# Patient Record
Sex: MALE | Race: WHITE | Employment: OTHER
[De-identification: names, ages, dates, MRNs, and addresses within clinical notes are randomized per-mention and may not be internally consistent; named-entity substitution may affect disease eponyms.]

---

## 2017-01-17 ENCOUNTER — TELEPHONE (OUTPATIENT)
Dept: GASTROENTEROLOGY | Facility: CLINIC | Age: 57
End: 2017-01-17

## 2017-01-17 DIAGNOSIS — I85.10 SECONDARY ESOPHAGEAL VARICES WITHOUT BLEEDING (HCC): Primary | ICD-10-CM

## 2017-02-16 DIAGNOSIS — D70.9 NEUTROPENIA, UNSPECIFIED TYPE (HCC): ICD-10-CM

## 2017-02-16 DIAGNOSIS — D69.6 THROMBOCYTOPENIA (HCC): ICD-10-CM

## 2017-02-16 DIAGNOSIS — I85.10 SECONDARY ESOPHAGEAL VARICES WITHOUT BLEEDING (HCC): ICD-10-CM

## 2017-02-20 ENCOUNTER — OFFICE VISIT (OUTPATIENT)
Dept: ONCOLOGY | Facility: CLINIC | Age: 57
End: 2017-02-20

## 2017-02-20 VITALS
DIASTOLIC BLOOD PRESSURE: 84 MMHG | TEMPERATURE: 98 F | WEIGHT: 206 LBS | RESPIRATION RATE: 20 BRPM | SYSTOLIC BLOOD PRESSURE: 142 MMHG | BODY MASS INDEX: 23.83 KG/M2 | HEIGHT: 78 IN | HEART RATE: 87 BPM

## 2017-02-20 DIAGNOSIS — D69.6 THROMBOCYTOPENIA (HCC): Primary | ICD-10-CM

## 2017-02-20 DIAGNOSIS — I10 ESSENTIAL HYPERTENSION: ICD-10-CM

## 2017-02-20 DIAGNOSIS — E11.8 TYPE 2 DIABETES MELLITUS WITH COMPLICATION, UNSPECIFIED LONG TERM INSULIN USE STATUS: ICD-10-CM

## 2017-02-20 DIAGNOSIS — I85.10 SECONDARY ESOPHAGEAL VARICES WITHOUT BLEEDING (HCC): ICD-10-CM

## 2017-02-20 DIAGNOSIS — D70.9 NEUTROPENIA, UNSPECIFIED TYPE (HCC): ICD-10-CM

## 2017-02-20 PROCEDURE — 99213 OFFICE O/P EST LOW 20 MIN: CPT | Performed by: INTERNAL MEDICINE

## 2017-02-20 PROCEDURE — G8427 DOCREV CUR MEDS BY ELIG CLIN: HCPCS | Performed by: INTERNAL MEDICINE

## 2017-02-20 PROCEDURE — 3045F PR MOST RECENT HEMOGLOBIN A1C LEVEL 7.0-9.0%: CPT | Performed by: INTERNAL MEDICINE

## 2017-02-20 PROCEDURE — G8482 FLU IMMUNIZE ORDER/ADMIN: HCPCS | Performed by: INTERNAL MEDICINE

## 2017-02-20 PROCEDURE — G8420 CALC BMI NORM PARAMETERS: HCPCS | Performed by: INTERNAL MEDICINE

## 2017-02-20 PROCEDURE — 3017F COLORECTAL CA SCREEN DOC REV: CPT | Performed by: INTERNAL MEDICINE

## 2017-02-20 PROCEDURE — 1036F TOBACCO NON-USER: CPT | Performed by: INTERNAL MEDICINE

## 2017-02-27 ENCOUNTER — TELEPHONE (OUTPATIENT)
Dept: ONCOLOGY | Facility: CLINIC | Age: 57
End: 2017-02-27

## 2017-02-27 RX ORDER — FERROUS SULFATE 325(65) MG
325 TABLET ORAL DAILY
Qty: 30 TABLET | Refills: 5 | Status: SHIPPED | OUTPATIENT
Start: 2017-02-27 | End: 2017-08-24 | Stop reason: SDUPTHER

## 2017-03-14 ENCOUNTER — ANESTHESIA EVENT (OUTPATIENT)
Dept: OPERATING ROOM | Age: 57
End: 2017-03-14
Payer: MEDICARE

## 2017-03-14 ENCOUNTER — ANESTHESIA (OUTPATIENT)
Dept: OPERATING ROOM | Age: 57
End: 2017-03-14
Payer: MEDICARE

## 2017-03-14 ENCOUNTER — HOSPITAL ENCOUNTER (OUTPATIENT)
Age: 57
Setting detail: OUTPATIENT SURGERY
Discharge: HOME | End: 2017-03-14
Attending: INTERNAL MEDICINE | Admitting: INTERNAL MEDICINE
Payer: MEDICARE

## 2017-03-14 VITALS
BODY MASS INDEX: 24.76 KG/M2 | WEIGHT: 214 LBS | TEMPERATURE: 97.1 F | HEIGHT: 78 IN | DIASTOLIC BLOOD PRESSURE: 82 MMHG | RESPIRATION RATE: 16 BRPM | OXYGEN SATURATION: 99 % | SYSTOLIC BLOOD PRESSURE: 132 MMHG | HEART RATE: 78 BPM

## 2017-03-14 VITALS
OXYGEN SATURATION: 100 % | DIASTOLIC BLOOD PRESSURE: 75 MMHG | SYSTOLIC BLOOD PRESSURE: 116 MMHG | RESPIRATION RATE: 13 BRPM

## 2017-03-14 LAB — GLUCOSE BLD-MCNC: 195 MG/DL (ref 74–100)

## 2017-03-14 PROCEDURE — 3700000000 HC ANESTHESIA ATTENDED CARE: Performed by: INTERNAL MEDICINE

## 2017-03-14 PROCEDURE — 7100000010 HC PHASE II RECOVERY - FIRST 15 MIN: Performed by: INTERNAL MEDICINE

## 2017-03-14 PROCEDURE — 82947 ASSAY GLUCOSE BLOOD QUANT: CPT

## 2017-03-14 PROCEDURE — 7100000011 HC PHASE II RECOVERY - ADDTL 15 MIN: Performed by: INTERNAL MEDICINE

## 2017-03-14 PROCEDURE — 2500000003 HC RX 250 WO HCPCS: Performed by: NURSE ANESTHETIST, CERTIFIED REGISTERED

## 2017-03-14 PROCEDURE — 3700000001 HC ADD 15 MINUTES (ANESTHESIA): Performed by: INTERNAL MEDICINE

## 2017-03-14 PROCEDURE — 43235 EGD DIAGNOSTIC BRUSH WASH: CPT | Performed by: INTERNAL MEDICINE

## 2017-03-14 PROCEDURE — 6360000002 HC RX W HCPCS: Performed by: NURSE ANESTHETIST, CERTIFIED REGISTERED

## 2017-03-14 PROCEDURE — 3609017100 HC EGD: Performed by: INTERNAL MEDICINE

## 2017-03-14 PROCEDURE — 2580000003 HC RX 258: Performed by: INTERNAL MEDICINE

## 2017-03-14 RX ORDER — PROPOFOL 10 MG/ML
INJECTION, EMULSION INTRAVENOUS PRN
Status: DISCONTINUED | OUTPATIENT
Start: 2017-03-14 | End: 2017-03-14 | Stop reason: SDUPTHER

## 2017-03-14 RX ORDER — LIDOCAINE HYDROCHLORIDE 20 MG/ML
INJECTION, SOLUTION EPIDURAL; INFILTRATION; INTRACAUDAL; PERINEURAL PRN
Status: DISCONTINUED | OUTPATIENT
Start: 2017-03-14 | End: 2017-03-14 | Stop reason: SDUPTHER

## 2017-03-14 RX ORDER — PROPOFOL 10 MG/ML
INJECTION, EMULSION INTRAVENOUS CONTINUOUS PRN
Status: DISCONTINUED | OUTPATIENT
Start: 2017-03-14 | End: 2017-03-14 | Stop reason: SDUPTHER

## 2017-03-14 RX ORDER — SODIUM CHLORIDE, SODIUM LACTATE, POTASSIUM CHLORIDE, CALCIUM CHLORIDE 600; 310; 30; 20 MG/100ML; MG/100ML; MG/100ML; MG/100ML
INJECTION, SOLUTION INTRAVENOUS CONTINUOUS
Status: DISCONTINUED | OUTPATIENT
Start: 2017-03-14 | End: 2017-03-14 | Stop reason: HOSPADM

## 2017-03-14 RX ORDER — FENTANYL CITRATE 50 UG/ML
INJECTION, SOLUTION INTRAMUSCULAR; INTRAVENOUS PRN
Status: DISCONTINUED | OUTPATIENT
Start: 2017-03-14 | End: 2017-03-14 | Stop reason: SDUPTHER

## 2017-03-14 RX ADMIN — SODIUM CHLORIDE, POTASSIUM CHLORIDE, SODIUM LACTATE AND CALCIUM CHLORIDE: 600; 310; 30; 20 INJECTION, SOLUTION INTRAVENOUS at 09:23

## 2017-03-14 RX ADMIN — LIDOCAINE HYDROCHLORIDE 100 MG: 20 INJECTION, SOLUTION EPIDURAL; INFILTRATION; INTRACAUDAL; PERINEURAL at 10:03

## 2017-03-14 RX ADMIN — PROPOFOL 100 MG: 10 INJECTION, EMULSION INTRAVENOUS at 10:02

## 2017-03-14 RX ADMIN — FENTANYL CITRATE 50 MCG: 50 INJECTION INTRAMUSCULAR; INTRAVENOUS at 10:01

## 2017-03-14 RX ADMIN — PROPOFOL 140 MCG/KG/MIN: 10 INJECTION, EMULSION INTRAVENOUS at 10:04

## 2017-03-14 RX ADMIN — FENTANYL CITRATE 50 MCG: 50 INJECTION INTRAMUSCULAR; INTRAVENOUS at 10:04

## 2017-03-14 ASSESSMENT — PAIN SCALES - GENERAL
PAINLEVEL_OUTOF10: 2
PAINLEVEL_OUTOF10: 2

## 2017-03-14 ASSESSMENT — PAIN DESCRIPTION - PAIN TYPE
TYPE: ACUTE PAIN
TYPE: ACUTE PAIN

## 2017-03-14 ASSESSMENT — PAIN - FUNCTIONAL ASSESSMENT: PAIN_FUNCTIONAL_ASSESSMENT: 0-10

## 2017-03-14 ASSESSMENT — PAIN DESCRIPTION - LOCATION
LOCATION: THROAT
LOCATION: THROAT

## 2017-06-15 ENCOUNTER — OFFICE VISIT (OUTPATIENT)
Dept: ONCOLOGY | Age: 57
End: 2017-06-15
Payer: MEDICARE

## 2017-06-15 VITALS
BODY MASS INDEX: 23.02 KG/M2 | RESPIRATION RATE: 22 BRPM | WEIGHT: 199 LBS | SYSTOLIC BLOOD PRESSURE: 129 MMHG | HEIGHT: 78 IN | DIASTOLIC BLOOD PRESSURE: 70 MMHG | TEMPERATURE: 97.7 F | HEART RATE: 64 BPM

## 2017-06-15 DIAGNOSIS — D70.9 NEUTROPENIA, UNSPECIFIED TYPE (HCC): ICD-10-CM

## 2017-06-15 DIAGNOSIS — K76.6 PORTAL HYPERTENSION (HCC): ICD-10-CM

## 2017-06-15 DIAGNOSIS — I85.10 SECONDARY ESOPHAGEAL VARICES WITHOUT BLEEDING (HCC): ICD-10-CM

## 2017-06-15 DIAGNOSIS — D69.6 THROMBOCYTOPENIA (HCC): Primary | ICD-10-CM

## 2017-06-15 DIAGNOSIS — K74.60 CIRRHOSIS OF LIVER WITHOUT ASCITES, UNSPECIFIED HEPATIC CIRRHOSIS TYPE (HCC): ICD-10-CM

## 2017-06-15 DIAGNOSIS — E61.1 IRON DEFICIENCY: ICD-10-CM

## 2017-06-15 PROCEDURE — 1036F TOBACCO NON-USER: CPT | Performed by: INTERNAL MEDICINE

## 2017-06-15 PROCEDURE — G8427 DOCREV CUR MEDS BY ELIG CLIN: HCPCS | Performed by: INTERNAL MEDICINE

## 2017-06-15 PROCEDURE — 99214 OFFICE O/P EST MOD 30 MIN: CPT | Performed by: INTERNAL MEDICINE

## 2017-06-15 PROCEDURE — 3017F COLORECTAL CA SCREEN DOC REV: CPT | Performed by: INTERNAL MEDICINE

## 2017-06-15 PROCEDURE — G8420 CALC BMI NORM PARAMETERS: HCPCS | Performed by: INTERNAL MEDICINE

## 2017-06-15 ASSESSMENT — ENCOUNTER SYMPTOMS
SORE THROAT: 0
CONSTIPATION: 0
EYE REDNESS: 0
COLOR CHANGE: 0
CHEST TIGHTNESS: 0
NAUSEA: 0
VOICE CHANGE: 0
WHEEZING: 0
BLOOD IN STOOL: 0
DIARRHEA: 0
SHORTNESS OF BREATH: 0
EYE ITCHING: 0
ABDOMINAL PAIN: 0
COUGH: 0
EYE DISCHARGE: 0
TROUBLE SWALLOWING: 0
VOMITING: 0

## 2017-08-24 RX ORDER — FERROUS SULFATE 325(65) MG
325 TABLET ORAL DAILY
Qty: 30 TABLET | Refills: 1 | Status: SHIPPED | OUTPATIENT
Start: 2017-08-24 | End: 2018-01-08 | Stop reason: SDUPTHER

## 2017-10-05 ENCOUNTER — OFFICE VISIT (OUTPATIENT)
Dept: ONCOLOGY | Age: 57
End: 2017-10-05
Payer: MEDICARE

## 2017-10-05 VITALS
HEART RATE: 67 BPM | TEMPERATURE: 97.4 F | SYSTOLIC BLOOD PRESSURE: 125 MMHG | WEIGHT: 201.4 LBS | RESPIRATION RATE: 16 BRPM | DIASTOLIC BLOOD PRESSURE: 58 MMHG | HEIGHT: 78 IN | BODY MASS INDEX: 23.3 KG/M2

## 2017-10-05 DIAGNOSIS — D70.9 NEUTROPENIA, UNSPECIFIED TYPE (HCC): ICD-10-CM

## 2017-10-05 DIAGNOSIS — K74.60 CIRRHOSIS OF LIVER WITHOUT ASCITES, UNSPECIFIED HEPATIC CIRRHOSIS TYPE (HCC): ICD-10-CM

## 2017-10-05 DIAGNOSIS — I85.10 SECONDARY ESOPHAGEAL VARICES WITHOUT BLEEDING (HCC): ICD-10-CM

## 2017-10-05 DIAGNOSIS — K90.9 INTESTINAL MALABSORPTION, UNSPECIFIED TYPE: ICD-10-CM

## 2017-10-05 DIAGNOSIS — D50.9 IRON DEFICIENCY ANEMIA, UNSPECIFIED IRON DEFICIENCY ANEMIA TYPE: ICD-10-CM

## 2017-10-05 DIAGNOSIS — K76.6 PORTAL HYPERTENSION (HCC): ICD-10-CM

## 2017-10-05 DIAGNOSIS — D69.6 THROMBOCYTOPENIA (HCC): Primary | ICD-10-CM

## 2017-10-05 PROCEDURE — G8427 DOCREV CUR MEDS BY ELIG CLIN: HCPCS | Performed by: INTERNAL MEDICINE

## 2017-10-05 PROCEDURE — 3017F COLORECTAL CA SCREEN DOC REV: CPT | Performed by: INTERNAL MEDICINE

## 2017-10-05 PROCEDURE — 1036F TOBACCO NON-USER: CPT | Performed by: INTERNAL MEDICINE

## 2017-10-05 PROCEDURE — 99214 OFFICE O/P EST MOD 30 MIN: CPT | Performed by: INTERNAL MEDICINE

## 2017-10-05 PROCEDURE — G8484 FLU IMMUNIZE NO ADMIN: HCPCS | Performed by: INTERNAL MEDICINE

## 2017-10-05 PROCEDURE — G8420 CALC BMI NORM PARAMETERS: HCPCS | Performed by: INTERNAL MEDICINE

## 2017-10-05 RX ORDER — 0.9 % SODIUM CHLORIDE 0.9 %
10 VIAL (ML) INJECTION ONCE
Status: CANCELLED | OUTPATIENT
Start: 2017-10-11 | End: 2017-10-09

## 2017-10-05 RX ORDER — SODIUM CHLORIDE 0.9 % (FLUSH) 0.9 %
10 SYRINGE (ML) INJECTION PRN
Status: CANCELLED | OUTPATIENT
Start: 2017-10-11

## 2017-10-05 RX ORDER — DIPHENHYDRAMINE HYDROCHLORIDE 50 MG/ML
50 INJECTION INTRAMUSCULAR; INTRAVENOUS ONCE
Status: CANCELLED | OUTPATIENT
Start: 2017-10-11 | End: 2017-10-09

## 2017-10-05 RX ORDER — HEPARIN SODIUM (PORCINE) LOCK FLUSH IV SOLN 100 UNIT/ML 100 UNIT/ML
500 SOLUTION INTRAVENOUS PRN
Status: CANCELLED | OUTPATIENT
Start: 2017-10-11

## 2017-10-05 RX ORDER — SODIUM CHLORIDE 0.9 % (FLUSH) 0.9 %
5 SYRINGE (ML) INJECTION PRN
Status: CANCELLED | OUTPATIENT
Start: 2017-10-11

## 2017-10-05 RX ORDER — METHYLPREDNISOLONE SODIUM SUCCINATE 125 MG/2ML
125 INJECTION, POWDER, LYOPHILIZED, FOR SOLUTION INTRAMUSCULAR; INTRAVENOUS ONCE
Status: CANCELLED | OUTPATIENT
Start: 2017-10-11 | End: 2017-10-09

## 2017-10-05 RX ORDER — SODIUM CHLORIDE 9 MG/ML
100 INJECTION, SOLUTION INTRAVENOUS CONTINUOUS
Status: CANCELLED | OUTPATIENT
Start: 2017-10-11

## 2017-10-05 RX ORDER — SODIUM CHLORIDE 9 MG/ML
INJECTION, SOLUTION INTRAVENOUS ONCE
Status: CANCELLED | OUTPATIENT
Start: 2017-10-11 | End: 2017-10-09

## 2017-10-05 ASSESSMENT — ENCOUNTER SYMPTOMS
COUGH: 0
ABDOMINAL PAIN: 0
SHORTNESS OF BREATH: 0
VOICE CHANGE: 0
COLOR CHANGE: 0
EYE DISCHARGE: 0
VOMITING: 0
SORE THROAT: 0
TROUBLE SWALLOWING: 0
CHEST TIGHTNESS: 0
DIARRHEA: 0
BLOOD IN STOOL: 0
WHEEZING: 0
EYE REDNESS: 0
CONSTIPATION: 0
NAUSEA: 0
EYE ITCHING: 0

## 2017-10-05 NOTE — PROGRESS NOTES
[facility] PHYSICIANS  [facility]  [address] [facility] [address]  Dept: [phone]  Dept Fax: [phone]  Mohsen Montiel is a 61 y.o. male who presents today for follow up of his   Chief Complaint   Patient presents with    Follow-up     thrombocytopenia         HPI  54-year-old gentleman with history of mental retardation and microcephaly, hypertension and diabetes mellitus. He has been seeing me for leukopenia and thrombocytopenia secondary to cirrhosis of the liver with portal hypertension and esophageal varices. .  He had a band ligation for esophageal varices by Dr. Lucas Escalante in the past.  He denies any bleeding or bruising at this time. On review of his blood work His pancytopenia has been getting a little worse. His iron studies show that he is iron deficient with a low iron saturation as well as serum iron. His TIBC is on the upper limit. Current Outpatient Prescriptions   Medication Sig Dispense Refill    ONE TOUCH ULTRASOFT LANCETS MISC TEST BLOOD SUGAR ONCE WEEKLY AND ONE EXTRA TIME ON SATURDAYS.  DX: E11.9 100 each 3    ferrous sulfate 325 (65 Fe) MG tablet Take 1 tablet by mouth daily 30 tablet 1    glucose blood VI test strips (ONE TOUCH ULTRA TEST) strip Use as directed TID//Diagnosis Code E11.9 300 strip 1    metFORMIN (GLUCOPHAGE-XR) 500 MG extended release tablet TAKE 2 TABLETS (1000MG) BY MOUTH TWICE DAILY                *PLS SEND 2 SP TABS ONCE RCV'D* 120 tablet 3    metoprolol tartrate (LOPRESSOR) 25 MG tablet GIVE 1 TABLET BY MOUTH ONCE DAILY 31 tablet 5    chlorhexidine (PERIDEX) 0.12 % solution RINSE MOUTH WITH CHLORHEXIDINE ONCE DAILY *CALL PHARMACY FOR REFILLS* 1 Bottle 5    loratadine (CLARITIN) 10 MG tablet TAKE 1 TABLET BY MOUTH ONCE DAILY 30 tablet 8    SALINE MIST 0.65 % nasal spray INSTILL 2 SPRAYS IN EACH NOSTRIL ONCE DAILY *CALL PHARMACY FOR REFILL* 1 Bottle 9    simvastatin (ZOCOR) 20 MG tablet TAKE 1 TABLET BY MOUTH EVERY EVENING 90 directed. Dispense 1 box 1 each 6    NONFORMULARY Sea salt nose spray 2 sprays each nostril daily      Throat Lozenges (HALLS COUGH DROPS MT) Take 1 lozenge by mouth every 4 hours as needed.  Polyvinyl Alcohol-Povidone (FRESHKOTE OP) Apply 1 drop to eye 2 times daily. No current facility-administered medications for this visit. Allergies   Allergen Reactions    Bee Venom Swelling       Past Medical History:   Diagnosis Date    Cataracts, bilateral     [facility] ([facility] [address]     DM type 2 (diabetes mellitus, type 2) (HCC)     Esophageal varices without bleeding (HCC)     Essential hypertension     Hepatitis     A and B    Mental disability     Mixed hyperlipidemia     Portal hypertension ([facility] [address]     TB (tuberculosis)         Past Surgical History:   Procedure Laterality Date    COLONOSCOPY  03/21/2016    -polyps,diverticulosis,hemorrhoids    ENDOSCOPY, COLON, DIAGNOSTIC      EGD numerous times    ENDOSCOPY, COLON, DIAGNOSTIC      most recent 11-05-12    EYE SURGERY      bilateral cataracts    NASAL POLYP SURGERY      UT ESOPHAGOGASTRODUODENOSCOPY TRANSORAL DIAGNOSTIC N/A 3/14/2017    EGD ESOPHAGOGASTRODUODENOSCOPY performed by Juan Fowler MD at [facility]  08-    Dr. Kendall Mccarthy  11/4/13    Varicies banding x4    UPPER GASTROINTESTINAL ENDOSCOPY  6/23/14    UPPER GASTROINTESTINAL ENDOSCOPY  10-6-14    Dr. Evelyn Lugo ENDOSCOPY  1/20/15        UPPER GASTROINTESTINAL ENDOSCOPY  03/21/2016    -bx,portal HTN, varices    UPPER GASTROINTESTINAL ENDOSCOPY  09/19/2016    -esoph varices,hypertensive gastropathy    UPPER GASTROINTESTINAL ENDOSCOPY  03/14/2017    Dr Imtiaz Wilcox varices,gastric varices       History reviewed. No pertinent family history.     Social History   Substance Use Topics    Smoking status: Never Smoker    Smokeless and oriented to person, place, and time. No cranial nerve deficit. Skin: Skin is warm and dry. No cyanosis. Nails show no clubbing. Peripheral vascular changes of lower extremeties   Psychiatric: He has a normal mood and affect. His behavior is normal. Thought content normal.   Nursing note and vitals reviewed. Results for orders placed or performed during the hospital encounter of 03/14/17   Glucose, Whole Blood   Result Value Ref Range    POC Glucose 195 (H) 74 - 100 mg/dL       ASSESSMENT:     1. Thrombocytopenia (HCC)  CBC Auto Differential    Comprehensive Metabolic Panel    Ferritin    Iron and TIBC    Vitamin B12 & Folate    Soluble transferrin receptor   2. Neutropenia, unspecified type (HCC)  CBC Auto Differential    Comprehensive Metabolic Panel    Ferritin    Iron and TIBC    Vitamin B12 & Folate    Soluble transferrin receptor   3. Cirrhosis of liver without ascites, unspecified hepatic cirrhosis type (HCC)  CBC Auto Differential    Comprehensive Metabolic Panel    Ferritin    Iron and TIBC    Vitamin B12 & Folate    Soluble transferrin receptor   4. Portal hypertension (HCC)  CBC Auto Differential    Comprehensive Metabolic Panel    Ferritin    Iron and TIBC    Vitamin B12 & Folate    Soluble transferrin receptor   5. Secondary esophageal varices without bleeding (HCC)  CBC Auto Differential    Comprehensive Metabolic Panel    Ferritin    Iron and TIBC    Vitamin B12 & Folate    Soluble transferrin receptor   6. Iron deficiency anemia, unspecified iron deficiency anemia type  CBC Auto Differential    Comprehensive Metabolic Panel    Ferritin    Iron and TIBC    Vitamin B12 & Folate    Soluble transferrin receptor   7. Intestinal malabsorption, unspecified type  CBC Auto Differential    Comprehensive Metabolic Panel    Ferritin    Iron and TIBC    Vitamin B12 & Folate    Soluble transferrin receptor     Pancytopenia secondary to cirrhosis of the liver with possible splenomegaly.   He has iron deficiency which could be from slow GI bleeding from the esophageal varices. He has been taking oral iron and may not be absorbing it due to his GI problems. We will schedule him for a course of IV iron and see if his counts improved. If not we will do a CAT scan of his abdomen and pelvis to evaluate the status of his liver and spleen. We'll see him back again in 2 months. PLAN:     Return in about 2 months (around 12/5/2017) for iron deficiency anemia, thrombocytopenia, leucopenia. Orders Placed This Encounter   Procedures    CBC Auto Differential     Standing Status:   Future     Standing Expiration Date:   10/5/2018    Comprehensive Metabolic Panel     Standing Status:   Future     Standing Expiration Date:   10/5/2018    Ferritin     Standing Status:   Future     Standing Expiration Date:   10/5/2018    Iron and TIBC     Standing Status:   Future     Standing Expiration Date:   10/5/2018     Order Specific Question:   Is Patient Fasting? Answer:   No     Order Specific Question:   No of Hours? Answer:   No    Vitamin B12 & Folate     Standing Status:   Future     Standing Expiration Date:   10/5/2018    Soluble transferrin receptor     Standing Status:   Future     Standing Expiration Date:   10/5/2018     No orders of the defined types were placed in this encounter.           Electronically signed by Manuel Hernandez MD on 10/5/2017 at 9:42 AM

## 2017-10-05 NOTE — MR AVS SNAPSHOT
After Visit Summary             Sonya Jansen   10/5/2017 9:00 AM   Office Visit    Description:  Male : 1960   Provider:  Linda Mahoney MD   Department:  [facility]              Your Follow-Up and Future Appointments         Below is a list of your follow-up and future appointments. This may not be a complete list as you may have made appointments directly with providers that we are not aware of or your providers may have made some for you. Please call your providers to confirm appointments. It is important to keep your appointments. Please bring your current insurance card, photo ID, co-pay, and all medication bottles to your appointment. If self-pay, payment is expected at the time of service. Your To-Do List     Future Appointments Provider Department Dept Phone    10/11/2017 9:00 AM SCHEDULE, [facility] ONC ROOM 8 [facility] [phone]    2017 9:00 AM [facility] [phone]    Please arrive 15 minutes prior to appointment, bring photo ID and insurance card. 2018 8:20 AM MD Lorena Barbosa -[phone]    Please arrive 15 minutes prior to appointment, bring photo ID and insurance card. Future Orders Complete By Expires    CBC Auto Differential [VSQ8852 Custom]  2017 10/5/2018    Comprehensive Metabolic Panel [FEU44 Custom]  2017 10/5/2018    Ferritin [LAB68 Custom]  2017 10/5/2018    Iron and TIBC [FDH636 Custom]  2017 10/5/2018    Soluble transferrin receptor [FFG8284 Custom]  2017 10/5/2018    Vitamin B12 & Folate [[id number] Custom]  2017 10/5/2018    Follow-Up    Return in about 2 months (around 2017) for iron deficiency anemia, thrombocytopenia, leucopenia.          Information from Your Visit        Department     Name Address Phone Fax    [facility] [address] [facility] [address]  [address] [phone] Thrombocytopenia ([facility] [address]    Colon cancer screening    DM type 2 (diabetes mellitus, type 2) ([facility] [address]    Mixed hyperlipidemia    Candidal balanitis    Hypertension    Esophageal varices ([facility] [address]      Immunizations as of 10/5/2017     Name Date    Influenza Vaccine, unspecified formulation 11/7/2015    Influenza Virus Vaccine 10/24/2016, 1/21/2014      Preventive Care        Date Due    HIV screening is recommended for all people regardless of risk factors  aged 15-65 years at least once (lifetime) who have never been HIV tested. 3/22/1974    Pneumococcal Vaccine - Pneumovax for adults aged 19-64 years with: chronic heart disease, chronic lung disease, diabetes mellitus, alcoholism, chronic liver disease, or cigarette smoking. (1 of 1 - PPSV23) 3/22/1978    Hemoglobin A1C (Test For Long-Term Glucose Control) 7/22/2017    Urine Check For Kidney Problems 7/22/2017    Cholesterol Screening 7/22/2017    Diabetic Foot Exam 11/28/2017 (Originally 9/25/2016)    Eye Exam By An Eye Doctor 11/28/2017 (Originally 8/3/2016)    Tetanus Combination Vaccine (1 - Tdap) 11/28/2017 (Originally 3/22/1978)    Yearly Flu Vaccine (1) 11/28/2017 (Originally 9/1/2017)    Colonoscopy 9/7/2018 (Originally 3/21/2017)            [address] allows you to send messages to your doctor, view your test results, renew your prescriptions, schedule appointments, view visit notes, and more. How Do I Sign Up? 1. In your Internet browser, go to [email or web address]  2. Click on the Sign Up Now link in the Sign In box. You will see the New Member Sign Up page. 3. Enter your [employer] Access Code exactly as it appears below. You will not need to use this code after youve completed the sign-up process. If you do not sign up before the expiration date, you must request a new code. [employer] Access Code: [email or web address]  Expires: 11/10/2017  8:36 AM    4.  Enter your Social Security Number ([id number]) and Date of Birth (misty/gloria/yyyy) as indicated and click Submit. You will be taken to the next sign-up page. 5. Create a Tappr ID. This will be your T[employer] login ID and cannot be changed, so think of one that is secure and easy to remember. 6. Create a Tappr password. You can change your password at any time. 7. Enter your Password Reset Question and Answer. This can be used at a later time if you forget your password. 8. Enter your e-mail address. You will receive e-mail notification when new information is available in [address] 9. Click Sign Up. You can now view your medical record. Additional Information  If you have questions, please contact the physician practice where you receive care. Remember, Tappr is NOT to be used for urgent needs. For medical emergencies, dial 911. For questions regarding your T[employer] account call [phone]. If you have a clinical question, please call your doctor's office.

## 2017-10-05 NOTE — LETTER
10/5/2017     MD Ajay Patterson [address], Carly Dowell    Dear Dr. Suraj Avalos MD, and Dr. Jarrod Kaiser ref. provider found: Thank you for referring Francene Curling, 1960, to me for evaluation. Below are the relevant portions of my assessment and plan of care. [facility] PHYSICIANS  [facility] ONCOLOGY SPECIALISTS  [address] [place] [address]  Dept: [phone]  Dept Fax: [phone]  Daron Stevenson is a 61 y.o. male who presents today for follow up of his   Chief Complaint   Patient presents with    Follow-up     thrombocytopenia         HPI  59-year-old gentleman with history of mental retardation and microcephaly, hypertension and diabetes mellitus. He has been seeing me for leukopenia and thrombocytopenia secondary to cirrhosis of the liver with portal hypertension and esophageal varices. .  He had a band ligation for esophageal varices by Dr. Barron Waldron in the past.  He denies any bleeding or bruising at this time. On review of his blood work His pancytopenia has been getting a little worse. His iron studies show that he is iron deficient with a low iron saturation as well as serum iron. His TIBC is on the upper limit. Current Outpatient Prescriptions   Medication Sig Dispense Refill    ONE TOUCH ULTRASOFT LANCETS MISC TEST BLOOD SUGAR ONCE WEEKLY AND ONE EXTRA TIME ON SATURDAYS.  DX: E11.9 100 each 3    ferrous sulfate 325 (65 Fe) MG tablet Take 1 tablet by mouth daily 30 tablet 1    glucose blood VI test strips (ONE TOUCH ULTRA TEST) strip Use as directed TID//Diagnosis Code E11.9 300 strip 1    metFORMIN (GLUCOPHAGE-XR) 500 MG extended release tablet TAKE 2 TABLETS (1000MG) BY MOUTH TWICE DAILY                *PLS SEND 2 SP TABS ONCE RCV'D* 120 tablet 3    metoprolol tartrate (LOPRESSOR) 25 MG tablet GIVE 1 TABLET BY MOUTH ONCE DAILY 31 tablet 5    chlorhexidine (PERIDEX) 0.12 % solution RINSE MOUTH WITH CHLORHEXIDINE  miconazole (MICOTIN) 2 % powder Apply topically every morning Apply topically 2 times daily.  lamoTRIgine (LAMICTAL) 100 MG tablet Take 100 mg by mouth daily      naproxen (NAPROSYN) 250 MG tablet Take 1 tablet by mouth daily as needed for Pain Or headaches 30 tablet 6    Disposable Gloves (LATEX GLOVES ONE SIZE) MISC Use as directed. Dispense 1 box 1 each 6    NONFORMULARY Sea salt nose spray 2 sprays each nostril daily      Throat Lozenges (HALLS COUGH DROPS MT) Take 1 lozenge by mouth every 4 hours as needed.  Polyvinyl Alcohol-Povidone (FRESHKOTE OP) Apply 1 drop to eye 2 times daily. No current facility-administered medications for this visit.         Allergies   Allergen Reactions    Bee Venom Swelling       Past Medical History:   Diagnosis Date    Cataracts, bilateral     Cirrhosis ([address]     DM type 2 (diabetes mellitus, type 2) (HCC)     Esophageal varices without bleeding (HCC)     Essential hypertension     Hepatitis     A and B    Mental disability     Mixed hyperlipidemia     Portal hypertension ([facility] [address]     TB (tuberculosis)         Past Surgical History:   Procedure Laterality Date    COLONOSCOPY  03/21/2016    -polyps,diverticulosis,hemorrhoids    ENDOSCOPY, COLON, DIAGNOSTIC      EGD numerous times    ENDOSCOPY, COLON, DIAGNOSTIC      most recent 11-05-12    EYE SURGERY      bilateral cataracts    NASAL POLYP SURGERY      MD ESOPHAGOGASTRODUODENOSCOPY TRANSORAL DIAGNOSTIC N/A 3/14/2017    EGD ESOPHAGOGASTRODUODENOSCOPY performed by Juany Powell MD at [place] [address]  08-    Dr. Ibeth Hankins  11/4/13    Varicies banding x4    UPPER GASTROINTESTINAL ENDOSCOPY  6/23/14    UPPER GASTROINTESTINAL ENDOSCOPY  10-6-14    Dr. Clarke Hy ENDOSCOPY  1/20/15        UPPER GASTROINTESTINAL ENDOSCOPY  03/21/2016    -bx,portal HTN, varices Comprehensive Metabolic Panel    Ferritin    Iron and TIBC    Vitamin B12 & Folate    Soluble transferrin receptor   7. Intestinal malabsorption, unspecified type  CBC Auto Differential    Comprehensive Metabolic Panel    Ferritin    Iron and TIBC    Vitamin B12 & Folate    Soluble transferrin receptor     Pancytopenia secondary to cirrhosis of the liver with possible splenomegaly. He has iron deficiency which could be from slow GI bleeding from the esophageal varices. He has been taking oral iron and may not be absorbing it due to his GI problems. We will schedule him for a course of IV iron and see if his counts improved. If not we will do a CAT scan of his abdomen and pelvis to evaluate the status of his liver and spleen. We'll see him back again in 2 months. PLAN:     Return in about 2 months (around 12/5/2017) for iron deficiency anemia, thrombocytopenia, leucopenia. Orders Placed This Encounter   Procedures    CBC Auto Differential     Standing Status:   Future     Standing Expiration Date:   10/5/2018    Comprehensive Metabolic Panel     Standing Status:   Future     Standing Expiration Date:   10/5/2018    Ferritin     Standing Status:   Future     Standing Expiration Date:   10/5/2018    Iron and TIBC     Standing Status:   Future     Standing Expiration Date:   10/5/2018     Order Specific Question:   Is Patient Fasting? Answer:   No     Order Specific Question:   No of Hours? Answer:   No    Vitamin B12 & Folate     Standing Status:   Future     Standing Expiration Date:   10/5/2018    Soluble transferrin receptor     Standing Status:   Future     Standing Expiration Date:   10/5/2018     No orders of the defined types were placed in this encounter. Electronically signed by Perfecto Oconnell MD on 10/5/2017 at 9:42 AM      If you have questions, please do not hesitate to call me. I look forward to following [facility] along with you.     Sincerely,        Perfecto Oconnell MD

## 2017-10-11 ENCOUNTER — HOSPITAL ENCOUNTER (OUTPATIENT)
Dept: INFUSION THERAPY | Age: 57
Discharge: HOME | End: 2017-10-11
Payer: MEDICARE

## 2017-10-11 VITALS
BODY MASS INDEX: 23.14 KG/M2 | WEIGHT: 200 LBS | HEIGHT: 78 IN | DIASTOLIC BLOOD PRESSURE: 59 MMHG | RESPIRATION RATE: 18 BRPM | HEART RATE: 64 BPM | TEMPERATURE: 97.3 F | SYSTOLIC BLOOD PRESSURE: 108 MMHG

## 2017-10-11 DIAGNOSIS — K90.9 INTESTINAL MALABSORPTION, UNSPECIFIED TYPE: ICD-10-CM

## 2017-10-11 DIAGNOSIS — D50.9 IRON DEFICIENCY ANEMIA, UNSPECIFIED IRON DEFICIENCY ANEMIA TYPE: ICD-10-CM

## 2017-10-11 PROCEDURE — 96413 CHEMO IV INFUSION 1 HR: CPT

## 2017-10-11 PROCEDURE — 96365 THER/PROPH/DIAG IV INF INIT: CPT

## 2017-10-11 PROCEDURE — 6360000002 HC RX W HCPCS: Performed by: INTERNAL MEDICINE

## 2017-10-11 PROCEDURE — 2580000003 HC RX 258: Performed by: INTERNAL MEDICINE

## 2017-10-11 RX ORDER — SODIUM CHLORIDE 0.9 % (FLUSH) 0.9 %
5 SYRINGE (ML) INJECTION PRN
Status: CANCELLED | OUTPATIENT
Start: 2017-10-11

## 2017-10-11 RX ORDER — METHYLPREDNISOLONE SODIUM SUCCINATE 125 MG/2ML
125 INJECTION, POWDER, LYOPHILIZED, FOR SOLUTION INTRAMUSCULAR; INTRAVENOUS ONCE
Status: CANCELLED | OUTPATIENT
Start: 2017-10-11 | End: 2017-10-11

## 2017-10-11 RX ORDER — HEPARIN SODIUM (PORCINE) LOCK FLUSH IV SOLN 100 UNIT/ML 100 UNIT/ML
500 SOLUTION INTRAVENOUS PRN
Status: CANCELLED | OUTPATIENT
Start: 2017-10-11

## 2017-10-11 RX ORDER — SODIUM CHLORIDE 9 MG/ML
INJECTION, SOLUTION INTRAVENOUS ONCE
Status: CANCELLED | OUTPATIENT
Start: 2017-10-11 | End: 2017-10-11

## 2017-10-11 RX ORDER — DIPHENHYDRAMINE HYDROCHLORIDE 50 MG/ML
50 INJECTION INTRAMUSCULAR; INTRAVENOUS ONCE
Status: CANCELLED | OUTPATIENT
Start: 2017-10-11 | End: 2017-10-11

## 2017-10-11 RX ORDER — SODIUM CHLORIDE 0.9 % (FLUSH) 0.9 %
10 SYRINGE (ML) INJECTION PRN
Status: DISCONTINUED | OUTPATIENT
Start: 2017-10-11 | End: 2017-10-12 | Stop reason: HOSPADM

## 2017-10-11 RX ORDER — 0.9 % SODIUM CHLORIDE 0.9 %
10 VIAL (ML) INJECTION ONCE
Status: CANCELLED | OUTPATIENT
Start: 2017-10-11 | End: 2017-10-11

## 2017-10-11 RX ORDER — SODIUM CHLORIDE 0.9 % (FLUSH) 0.9 %
10 SYRINGE (ML) INJECTION PRN
Status: CANCELLED | OUTPATIENT
Start: 2017-10-11

## 2017-10-11 RX ORDER — SODIUM CHLORIDE 9 MG/ML
100 INJECTION, SOLUTION INTRAVENOUS CONTINUOUS
Status: CANCELLED | OUTPATIENT
Start: 2017-10-11

## 2017-10-11 RX ORDER — SODIUM CHLORIDE 9 MG/ML
INJECTION, SOLUTION INTRAVENOUS ONCE
Status: COMPLETED | OUTPATIENT
Start: 2017-10-11 | End: 2017-10-11

## 2017-10-11 RX ADMIN — Medication 10 ML: at 09:05

## 2017-10-11 RX ADMIN — SODIUM CHLORIDE: 9 INJECTION, SOLUTION INTRAVENOUS at 09:05

## 2017-10-11 RX ADMIN — FERUMOXYTOL 510 MG: 510 INJECTION INTRAVENOUS at 09:08

## 2017-10-17 ENCOUNTER — TELEPHONE (OUTPATIENT)
Dept: GASTROENTEROLOGY | Age: 57
End: 2017-10-17

## 2017-10-17 DIAGNOSIS — I85.10 SECONDARY ESOPHAGEAL VARICES WITHOUT BLEEDING (HCC): ICD-10-CM

## 2017-10-17 DIAGNOSIS — K74.60 CIRRHOSIS OF LIVER WITHOUT ASCITES, UNSPECIFIED HEPATIC CIRRHOSIS TYPE (HCC): ICD-10-CM

## 2017-10-17 DIAGNOSIS — K76.6 PORTAL HYPERTENSION (HCC): ICD-10-CM

## 2017-10-17 NOTE — TELEPHONE ENCOUNTER
Patient's advocate called and asked if he is to have another EGD?  I did not see order please advise

## 2017-10-19 ENCOUNTER — HOSPITAL ENCOUNTER (OUTPATIENT)
Dept: INFUSION THERAPY | Age: 57
Discharge: HOME | End: 2017-10-19
Payer: MEDICARE

## 2017-10-19 VITALS
TEMPERATURE: 97.5 F | HEART RATE: 67 BPM | DIASTOLIC BLOOD PRESSURE: 73 MMHG | SYSTOLIC BLOOD PRESSURE: 114 MMHG | RESPIRATION RATE: 18 BRPM

## 2017-10-19 DIAGNOSIS — D50.9 IRON DEFICIENCY ANEMIA, UNSPECIFIED IRON DEFICIENCY ANEMIA TYPE: ICD-10-CM

## 2017-10-19 DIAGNOSIS — K90.9 INTESTINAL MALABSORPTION, UNSPECIFIED TYPE: ICD-10-CM

## 2017-10-19 PROCEDURE — 96365 THER/PROPH/DIAG IV INF INIT: CPT

## 2017-10-19 PROCEDURE — 6360000002 HC RX W HCPCS: Performed by: INTERNAL MEDICINE

## 2017-10-19 PROCEDURE — 2580000003 HC RX 258: Performed by: INTERNAL MEDICINE

## 2017-10-19 RX ORDER — SODIUM CHLORIDE 9 MG/ML
INJECTION, SOLUTION INTRAVENOUS ONCE
Status: COMPLETED | OUTPATIENT
Start: 2017-10-19 | End: 2017-10-19

## 2017-10-19 RX ORDER — SODIUM CHLORIDE 0.9 % (FLUSH) 0.9 %
10 SYRINGE (ML) INJECTION PRN
Status: CANCELLED | OUTPATIENT
Start: 2017-10-19

## 2017-10-19 RX ORDER — SODIUM CHLORIDE 0.9 % (FLUSH) 0.9 %
10 SYRINGE (ML) INJECTION PRN
Status: DISCONTINUED | OUTPATIENT
Start: 2017-10-19 | End: 2017-10-20 | Stop reason: HOSPADM

## 2017-10-19 RX ORDER — SODIUM CHLORIDE 9 MG/ML
100 INJECTION, SOLUTION INTRAVENOUS CONTINUOUS
Status: CANCELLED | OUTPATIENT
Start: 2017-10-19

## 2017-10-19 RX ORDER — METHYLPREDNISOLONE SODIUM SUCCINATE 125 MG/2ML
125 INJECTION, POWDER, LYOPHILIZED, FOR SOLUTION INTRAMUSCULAR; INTRAVENOUS ONCE
Status: CANCELLED | OUTPATIENT
Start: 2017-10-19 | End: 2017-10-19

## 2017-10-19 RX ORDER — SODIUM CHLORIDE 9 MG/ML
INJECTION, SOLUTION INTRAVENOUS ONCE
Status: CANCELLED | OUTPATIENT
Start: 2017-10-19 | End: 2017-10-19

## 2017-10-19 RX ORDER — DIPHENHYDRAMINE HYDROCHLORIDE 50 MG/ML
50 INJECTION INTRAMUSCULAR; INTRAVENOUS ONCE
Status: CANCELLED | OUTPATIENT
Start: 2017-10-19 | End: 2017-10-19

## 2017-10-19 RX ORDER — 0.9 % SODIUM CHLORIDE 0.9 %
10 VIAL (ML) INJECTION ONCE
Status: CANCELLED | OUTPATIENT
Start: 2017-10-19 | End: 2017-10-19

## 2017-10-19 RX ORDER — HEPARIN SODIUM (PORCINE) LOCK FLUSH IV SOLN 100 UNIT/ML 100 UNIT/ML
500 SOLUTION INTRAVENOUS PRN
Status: CANCELLED | OUTPATIENT
Start: 2017-10-19

## 2017-10-19 RX ORDER — SODIUM CHLORIDE 0.9 % (FLUSH) 0.9 %
5 SYRINGE (ML) INJECTION PRN
Status: CANCELLED | OUTPATIENT
Start: 2017-10-19

## 2017-10-19 RX ADMIN — SODIUM CHLORIDE: 9 INJECTION, SOLUTION INTRAVENOUS at 09:11

## 2017-10-19 RX ADMIN — Medication 10 ML: at 09:08

## 2017-10-19 RX ADMIN — FERUMOXYTOL 510 MG: 510 INJECTION INTRAVENOUS at 09:12

## 2017-10-27 PROBLEM — Z86.010 HISTORY OF COLON POLYPS: Status: ACTIVE | Noted: 2017-10-27

## 2017-10-27 PROBLEM — Z86.0100 HISTORY OF COLON POLYPS: Status: ACTIVE | Noted: 2017-10-27

## 2017-11-03 ENCOUNTER — HOSPITAL ENCOUNTER (OUTPATIENT)
Dept: VASCULAR LAB | Age: 57
Discharge: HOME | End: 2017-11-03
Payer: MEDICARE

## 2017-11-03 DIAGNOSIS — M79.605 PAIN IN BOTH LOWER EXTREMITIES: ICD-10-CM

## 2017-11-03 DIAGNOSIS — M79.604 PAIN IN BOTH LOWER EXTREMITIES: ICD-10-CM

## 2017-11-03 PROCEDURE — 93970 EXTREMITY STUDY: CPT

## 2017-11-29 ENCOUNTER — ANESTHESIA (OUTPATIENT)
Dept: OPERATING ROOM | Age: 57
End: 2017-11-29
Payer: MEDICARE

## 2017-11-29 ENCOUNTER — HOSPITAL ENCOUNTER (OUTPATIENT)
Age: 57
Setting detail: OUTPATIENT SURGERY
Discharge: HOME | End: 2017-11-29
Attending: INTERNAL MEDICINE | Admitting: INTERNAL MEDICINE
Payer: MEDICARE

## 2017-11-29 ENCOUNTER — ANESTHESIA EVENT (OUTPATIENT)
Dept: OPERATING ROOM | Age: 57
End: 2017-11-29
Payer: MEDICARE

## 2017-11-29 VITALS
SYSTOLIC BLOOD PRESSURE: 113 MMHG | RESPIRATION RATE: 13 BRPM | DIASTOLIC BLOOD PRESSURE: 84 MMHG | OXYGEN SATURATION: 100 %

## 2017-11-29 VITALS
TEMPERATURE: 97.6 F | DIASTOLIC BLOOD PRESSURE: 81 MMHG | HEART RATE: 71 BPM | SYSTOLIC BLOOD PRESSURE: 127 MMHG | OXYGEN SATURATION: 100 % | BODY MASS INDEX: 24.76 KG/M2 | WEIGHT: 214 LBS | RESPIRATION RATE: 16 BRPM | HEIGHT: 78 IN

## 2017-11-29 PROCEDURE — 2500000003 HC RX 250 WO HCPCS: Performed by: NURSE ANESTHETIST, CERTIFIED REGISTERED

## 2017-11-29 PROCEDURE — 3609017100 HC EGD: Performed by: INTERNAL MEDICINE

## 2017-11-29 PROCEDURE — 3700000001 HC ADD 15 MINUTES (ANESTHESIA): Performed by: INTERNAL MEDICINE

## 2017-11-29 PROCEDURE — 7100000011 HC PHASE II RECOVERY - ADDTL 15 MIN: Performed by: INTERNAL MEDICINE

## 2017-11-29 PROCEDURE — 3609027000 HC COLONOSCOPY: Performed by: INTERNAL MEDICINE

## 2017-11-29 PROCEDURE — 43235 EGD DIAGNOSTIC BRUSH WASH: CPT | Performed by: INTERNAL MEDICINE

## 2017-11-29 PROCEDURE — 3700000000 HC ANESTHESIA ATTENDED CARE: Performed by: INTERNAL MEDICINE

## 2017-11-29 PROCEDURE — 7100000010 HC PHASE II RECOVERY - FIRST 15 MIN: Performed by: INTERNAL MEDICINE

## 2017-11-29 PROCEDURE — 2580000003 HC RX 258: Performed by: INTERNAL MEDICINE

## 2017-11-29 PROCEDURE — G0105 COLORECTAL SCRN; HI RISK IND: HCPCS | Performed by: INTERNAL MEDICINE

## 2017-11-29 PROCEDURE — 6360000002 HC RX W HCPCS: Performed by: NURSE ANESTHETIST, CERTIFIED REGISTERED

## 2017-11-29 RX ORDER — MIDAZOLAM HYDROCHLORIDE 1 MG/ML
INJECTION INTRAMUSCULAR; INTRAVENOUS PRN
Status: DISCONTINUED | OUTPATIENT
Start: 2017-11-29 | End: 2017-11-29 | Stop reason: SDUPTHER

## 2017-11-29 RX ORDER — LIDOCAINE HYDROCHLORIDE 20 MG/ML
INJECTION, SOLUTION INFILTRATION; PERINEURAL PRN
Status: DISCONTINUED | OUTPATIENT
Start: 2017-11-29 | End: 2017-11-29 | Stop reason: SDUPTHER

## 2017-11-29 RX ORDER — FENTANYL CITRATE 50 UG/ML
INJECTION, SOLUTION INTRAMUSCULAR; INTRAVENOUS PRN
Status: DISCONTINUED | OUTPATIENT
Start: 2017-11-29 | End: 2017-11-29 | Stop reason: SDUPTHER

## 2017-11-29 RX ORDER — SODIUM CHLORIDE, SODIUM LACTATE, POTASSIUM CHLORIDE, CALCIUM CHLORIDE 600; 310; 30; 20 MG/100ML; MG/100ML; MG/100ML; MG/100ML
INJECTION, SOLUTION INTRAVENOUS CONTINUOUS
Status: DISCONTINUED | OUTPATIENT
Start: 2017-11-29 | End: 2017-11-29 | Stop reason: HOSPADM

## 2017-11-29 RX ORDER — PROPOFOL 10 MG/ML
INJECTION, EMULSION INTRAVENOUS CONTINUOUS PRN
Status: DISCONTINUED | OUTPATIENT
Start: 2017-11-29 | End: 2017-11-29 | Stop reason: SDUPTHER

## 2017-11-29 RX ORDER — KETAMINE HYDROCHLORIDE 100 MG/ML
INJECTION, SOLUTION INTRAMUSCULAR; INTRAVENOUS PRN
Status: DISCONTINUED | OUTPATIENT
Start: 2017-11-29 | End: 2017-11-29 | Stop reason: SDUPTHER

## 2017-11-29 RX ADMIN — PROPOFOL 200 MCG/KG/MIN: 10 INJECTION, EMULSION INTRAVENOUS at 13:37

## 2017-11-29 RX ADMIN — MIDAZOLAM HYDROCHLORIDE 1 MG: 1 INJECTION, SOLUTION INTRAMUSCULAR; INTRAVENOUS at 13:28

## 2017-11-29 RX ADMIN — LIDOCAINE HYDROCHLORIDE 100 MG: 20 INJECTION, SOLUTION INFILTRATION; PERINEURAL at 13:31

## 2017-11-29 RX ADMIN — SODIUM CHLORIDE, POTASSIUM CHLORIDE, SODIUM LACTATE AND CALCIUM CHLORIDE: 600; 310; 30; 20 INJECTION, SOLUTION INTRAVENOUS at 13:26

## 2017-11-29 RX ADMIN — KETAMINE HYDROCHLORIDE 30 MG: 100 INJECTION INTRAMUSCULAR; INTRAVENOUS at 13:31

## 2017-11-29 RX ADMIN — FENTANYL CITRATE 50 MCG: 50 INJECTION INTRAMUSCULAR; INTRAVENOUS at 13:28

## 2017-11-29 RX ADMIN — KETAMINE HYDROCHLORIDE 40 MG: 100 INJECTION INTRAMUSCULAR; INTRAVENOUS at 13:37

## 2017-11-29 NOTE — ANESTHESIA POSTPROCEDURE EVALUATION
Department of Anesthesiology  Postprocedure Note    Patient: Trudy Aviles  MRN: [id number]  YOB: 1960  Date of evaluation: 11/29/2017  Time:  2:52 PM     Procedure Summary     Date:  11/29/17 Room / Location:  [facility] ENDO 02 / [facility] OR    Anesthesia Start:  1326 Anesthesia Stop:  3075    Procedures:       EGD ESOPHAGOGASTRODUODENOSCOPY (N/A )      COLONOSCOPY (N/A ) Diagnosis:  (DYSPHAGIA)    Surgeon:  Arnulfo Pinedo MD Responsible Provider:  Celine Garcia CRNA    Anesthesia Type:  general ASA Status:  4          Anesthesia Type: general    Luisa Phase I:      Luisa Phase II:      Last vitals: Reviewed and per EMR flowsheets.        Anesthesia Post Evaluation    Patient location during evaluation: PACU  Patient participation: complete - patient participated  Level of consciousness: sleepy but conscious (pt was sleepy at baseline)  Pain score: 0  Airway patency: patent  Nausea & Vomiting: no nausea and no vomiting  Complications: no  Cardiovascular status: blood pressure returned to baseline  Respiratory status: acceptable  Hydration status: euvolemic

## 2017-11-29 NOTE — ANESTHESIA PRE PROCEDURE
terbinafine (LAMISIL) 1 % cream Apply topically 2 times daily. 10/27/17   Willian Roe CNP   chlorhexidine (PERIDEX) 0.12 % solution RINSE MOUTH WITH CHLORHEXIDINE ONCE DAILY *CALL PHARMACY FOR REFILLS* 10/23/17   Destiney Meza MD   ONE TOUCH ULTRASOFT LANCETS MISC TEST BLOOD SUGAR ONCE WEEKLY AND ONE EXTRA TIME ON SATURDAYS. DX: E11.9 9/26/17   Destiney Meza MD   ferrous sulfate 325 (65 Fe) MG tablet Take 1 tablet by mouth daily 8/24/17   Valentín Valencia MD   glucose blood VI test strips (ONE TOUCH ULTRA TEST) strip Use as directed TID//Diagnosis Code E11.9 7/24/17   Destiney Meza MD   BD ULTRA-FINE PEN NEEDLES 29G X 12.7MM MISC USE AS DIRECTED WITH LANTUS **MUST CALL PHARMACY FOR REFILL** 6/28/17   Destiney Meza MD   Disposable Gloves (LATEX GLOVES) MISC USE AS DIRECTED 6/28/17   Destiney Meza MD   Multiple Vitamin (DAILY-BEHZAD) TABS TAKE 1 TABLET BY MOUTH ONCE DAILY 6/16/17   Destiney Meza MD   EPINEPHrine (EPIPEN 2-AGGIE) 0.3 MG/0.3ML SOAJ injection INJECT 1 PEN INTRAMUSCULARLY AS DIRECTED AS NEEDED FOR REACTION TO BEE STING *CALL PHARMACY FOR REFILLS* 4/25/17   Destiney Meza MD   UNABLE TO FIND PNEUMOVAX 23 / vaccine for pneumonia 3/27/17   Destiney Meza MD   ONE TOUCH ULTRASOFT LANCETS MISC Test BS daily at 8am and 2 hours post supper weekly on Saturday .   Dx. E 11.9 11/23/16   Destiney Meza MD   Hypromellose (GENTEAL MILD TO MODERATE OP) Place 1 drop into both eyes 2 times daily    Historical Provider, MD   MAPAP 325 MG tablet TAKE 2 TABS (650MG) BY MOUTH DAILY AS NEEDED FOR PAIN OR FEVER OVER 100 *CALL PHARMACY FOR REFILLS* OK TO CHARGE 8/22/16   Destiney Meza MD   MI-ACID [phone] MG/5ML SUSP suspension TAKE 30ML BY MOUTH EVERY 4 HOURS AS NEEDED FOR STOMACH ACID,INDIGESTION OR HEARTBURN OR GAS *CALL PHARMACY FOR REFILLS* 6/7/16   Destiney Meza MD   naproxen (NAPROSYN) 250 MG tablet Take 1 tablet by mouth daily as needed for Pain Or headaches 7/7/15 03/10/2014    HGB 12.2 03/10/2014    HCT 36.3 03/10/2014    MCV 86.9 03/10/2014    RDW 14.5 03/10/2014    PLT 55 03/10/2014       CMP:   Lab Results   Component Value Date     07/22/2016    K 4.2 07/22/2016     07/22/2016    CO2 25 07/22/2016    BUN 14 07/22/2016    CREATININE 0.57 07/22/2016    GFRAA >60 07/22/2016    LABGLOM >60 07/22/2016    GLUCOSE 108 07/22/2016    GLUCOSE 88 02/08/2012    PROT 7.1 12/23/2014    CALCIUM 9.3 07/22/2016    BILITOT 0.51 12/23/2014    ALKPHOS 82 12/23/2014    AST 23 12/23/2014    ALT 22 12/23/2014       POC Tests: No results for input(s): POCGLU, POCNA, POCK, POCCL, POCBUN, POCHEMO, POCHCT in the last 72 hours. Coags: No results found for: PROTIME, INR, APTT    HCG (If Applicable): No results found for: PREGTESTUR, PREGSERUM, HCG, HCGQUANT     ABGs: No results found for: PHART, PO2ART, XUD6VSU, OOG5HBM, BEART, P3IEYCDE     Type & Screen (If Applicable):  No results found for: LABABO, LABRH    Anesthesia Evaluation   no history of anesthetic complications:   Airway: Mallampati: II  TM distance: >3 FB   Neck ROM: full  Mouth opening: > = 3 FB Dental:      Comment: Poor dentition    Pulmonary:normal exam  breath sounds clear to auscultation                             Cardiovascular:  Exercise tolerance: good (>4 METS),   (+) hypertension:,                   Neuro/Psych:   (+) psychiatric history (MRDD):            GI/Hepatic/Renal:   (+) hepatitis:, liver disease: portal hypertension, esophageal varices, bowel prep,           Endo/Other:    (+) Type II DM, poorly controlled, , .                 Abdominal:           Vascular:                                        Anesthesia Plan      general     ASA 4       Induction: intravenous. Anesthetic plan and risks discussed with legal guardian.                       Arelis Espinoza CRNA   11/29/2017

## 2017-11-29 NOTE — H&P
History and Physical    Patient's Name/Date of Birth: Mohsen Montiel / 1960 (2 y.o.)    Date: November 29, 2017     CHIEF COMPLAINT:  History of colon polyps, history of esophageal varices    Past Medical History:   Diagnosis Date    Cataracts, bilateral     Cirrhosis ([address]     DM type 2 (diabetes mellitus, type 2) [facility]     Esophageal varices without bleeding [facility] [facility]     Essential hypertension     Hepatitis     A and B    Mental disability     Mixed hyperlipidemia     Portal hypertension ([facility] [address]     TB (tuberculosis)      Past Surgical History:   Procedure Laterality Date    CATARACT REMOVAL  1977    CHOLECYSTECTOMY  2007    COLONOSCOPY  03/21/2016    -polyps,diverticulosis,hemorrhoids    ENDOSCOPY, COLON, DIAGNOSTIC      EGD numerous times    ENDOSCOPY, COLON, DIAGNOSTIC      most recent 11-05-12    EYE SURGERY      bilateral cataracts    NASAL POLYP SURGERY      NOSE SURGERY      [place] ESOPHAGOGASTRODUODENOSCOPY TRANSORAL DIAGNOSTIC N/A 3/14/2017    EGD ESOPHAGOGASTRODUODENOSCOPY performed by Trisha Herman MD at [facility]  08-    Dr. Annita Cho  11/4/13    Varicies banding x4    UPPER GASTROINTESTINAL ENDOSCOPY  6/23/14    UPPER GASTROINTESTINAL ENDOSCOPY  10-6-14    Dr. Joyner Aw ENDOSCOPY  1/20/15        UPPER GASTROINTESTINAL ENDOSCOPY  03/21/2016    -bx,portal HTN, varices    UPPER GASTROINTESTINAL ENDOSCOPY  09/19/2016    -esoph varices,hypertensive gastropathy    UPPER GASTROINTESTINAL ENDOSCOPY  03/14/2017    Dr Ridge Moreland varices,gastric varices     Current Facility-Administered Medications   Medication Dose Route Frequency Provider Last Rate Last Dose    lactated ringers infusion   Intravenous Continuous Trisha Herman MD        lactated ringers infusion   Intravenous Continuous Trisha Herman MD         Allergies   Allergen Reactions    Bee Venom Swelling     History reviewed. No pertinent family history. Social History     Social History    Marital status: Single     Spouse name: N/A    Number of children: N/A    Years of education: N/A     Occupational History    Not on file. Social History Main Topics    Smoking status: Never Smoker    Smokeless tobacco: Never Used    Alcohol use No    Drug use: No    Sexual activity: Not on file     Other Topics Concern    Not on file     Social History Narrative    No narrative on file     ROS: Non-contributory    Physical Exam:  Vitals:    11/29/17 1246   BP: 128/83   Pulse: 70   Resp: 20   Temp: 97.2 °F (36.2 °C)   SpO2: 98%       Chest: Breath sounds were clear and equal with no rales, wheezes, or rhonchi. Respiratory effort was normal with no retractions or use of accessory muscles. Cardiovascular: Heart sounds were normal with a regular rate and rhythm. There were no murmurs, gallops or rubs. Abdomen: Bowel sounds were normal.  The abdomen was soft and non distended. There was no tenderness, guarding, rebound, or rigidity. There were no masses, hepatosplenomegaly, or hernias.     Plan: Colonoscopy, EGD      Electronically by Evelyn Rockwell MD  on 11/29/2017 at 1:20 PM

## 2017-11-29 NOTE — ANESTHESIA POSTPROCEDURE EVALUATION
Department of Anesthesiology  Postprocedure Note    Patient: Marianne Campbell  MRN: [id number]  YOB: 1960  Date of evaluation: 11/29/2017  Time:  2:46 PM     Procedure Summary     Date:  11/29/17 Room / Location:  [facility] ENDO  / [facility] OR    Anesthesia Start:  1326 Anesthesia Stop:  6082    Procedures:       EGD ESOPHAGOGASTRODUODENOSCOPY (N/A )      COLONOSCOPY (N/A ) Diagnosis:  (DYSPHAGIA)    Surgeon:  Melissa Sun MD Responsible Provider:  Jaxon Maria CRNA    Anesthesia Type:  general ASA Status:  4          Anesthesia Type: general    Liusa Phase I:      Luisa Phase II:      Last vitals: Reviewed and per EMR flowsheets. Anesthesia Post Evaluation    Patient location during evaluation: PACU  Patient participation: complete - patient participated  Level of consciousness: awake and alert  Pain score: 0  Airway patency: patent  Nausea & Vomiting: no nausea and no vomiting  Complications: no  Cardiovascular status: hemodynamically stable  Respiratory status: acceptable  Hydration status: stable  Comments: Voiced no anesthetic complaints or concerns. Pleased with anesthetic. Met all discharge criteria.

## 2017-11-29 NOTE — OP NOTE
PROCEDURE NOTE    DATE OF PROCEDURE: 11/29/2017    ENDOSCOPIST: Radha Almonte. Luna Patterson MD, [facility]    ASSISTANT: None    PREOPERATIVE DIAGNOSIS: History of colon polyps    POSTOPERATIVE DIAGNOSIS: hemorrhoids internal, Moderate in size    OPERATION: Colonoscopy --screening    ANESTHESIA: MAC     ESTIMATED BLOOD LOSS: No    COMPLICATIONS: None. SPECIMENS: were not obtained    HISTORY: The patient is a 61y.o. year old male with history of above preop diagnosis. Colonoscopy with possible biopsy or polypectomy has been recommended and I explained the risk, benefits, expected outcome, and alternatives to the procedure. Risks include but are not limited to bleeding, infection, respiratory distress, hypotension, and perforation of the colon. The patient understands and is in agreement. PROCEDURE: The patient was given monitored anesthesia care. The patient was given oxygen by nasal cannula. The colonoscope was inserted per rectum and advanced under direct vision to the cecum without difficulty. Findings:  Cecum/Ascending colon: normal    Transverse colon: normal    Descending/Sigmoid colon: normal    Rectum/Anus: examined in normal and retroflexed positions and was abnormal: hemorrhoids    The colon was decompressed and the scope was removed. The patient tolerated the procedure well. Current guidelines call for a repeat colonoscopy in 5 years.       Electronically signed by Garima Rizvi MD  on 11/29/2017 at 2:14 PM

## 2017-12-12 ENCOUNTER — HOSPITAL ENCOUNTER (OUTPATIENT)
Age: 57
Discharge: HOME | End: 2017-12-12
Payer: MEDICARE

## 2017-12-12 DIAGNOSIS — D69.6 THROMBOCYTOPENIA (HCC): ICD-10-CM

## 2017-12-12 DIAGNOSIS — K90.9 INTESTINAL MALABSORPTION, UNSPECIFIED TYPE: ICD-10-CM

## 2017-12-12 DIAGNOSIS — K76.6 PORTAL HYPERTENSION (HCC): ICD-10-CM

## 2017-12-12 DIAGNOSIS — D70.9 NEUTROPENIA, UNSPECIFIED TYPE (HCC): ICD-10-CM

## 2017-12-12 DIAGNOSIS — I85.10 SECONDARY ESOPHAGEAL VARICES WITHOUT BLEEDING (HCC): ICD-10-CM

## 2017-12-12 DIAGNOSIS — D50.9 IRON DEFICIENCY ANEMIA, UNSPECIFIED IRON DEFICIENCY ANEMIA TYPE: ICD-10-CM

## 2017-12-12 DIAGNOSIS — K74.60 CIRRHOSIS OF LIVER WITHOUT ASCITES, UNSPECIFIED HEPATIC CIRRHOSIS TYPE (HCC): ICD-10-CM

## 2017-12-12 LAB
ABSOLUTE EOS #: 0.1 K/UL (ref 0–0.4)
ABSOLUTE IMMATURE GRANULOCYTE: ABNORMAL K/UL (ref 0–0.3)
ABSOLUTE LYMPH #: 0.59 K/UL (ref 1–4.8)
ABSOLUTE MONO #: 0.3 K/UL (ref 0–1)
ALBUMIN SERPL-MCNC: 4 G/DL (ref 3.5–5.2)
ALBUMIN/GLOBULIN RATIO: 1.3 (ref 1–2.5)
ALP BLD-CCNC: 122 U/L (ref 40–129)
ALT SERPL-CCNC: 29 U/L (ref 5–41)
ANION GAP SERPL CALCULATED.3IONS-SCNC: 10 MMOL/L (ref 9–17)
AST SERPL-CCNC: 23 U/L
BASOPHILS # BLD: 1 % (ref 0–2)
BASOPHILS ABSOLUTE: 0.03 K/UL (ref 0–0.2)
BILIRUB SERPL-MCNC: 0.99 MG/DL (ref 0.3–1.2)
BUN BLDV-MCNC: 14 MG/DL (ref 6–20)
BUN/CREAT BLD: 22 (ref 9–20)
CALCIUM SERPL-MCNC: 9.4 MG/DL (ref 8.6–10.4)
CHLORIDE BLD-SCNC: 99 MMOL/L (ref 98–107)
CO2: 30 MMOL/L (ref 20–31)
CREAT SERPL-MCNC: 0.64 MG/DL (ref 0.7–1.2)
DIFFERENTIAL TYPE: ABNORMAL
EOSINOPHILS RELATIVE PERCENT: 3 % (ref 0–8)
FERRITIN: 227 UG/L (ref 30–400)
FOLATE: >20 NG/ML
GFR AFRICAN AMERICAN: >60 ML/MIN
GFR NON-AFRICAN AMERICAN: >60 ML/MIN
GFR SERPL CREATININE-BSD FRML MDRD: ABNORMAL ML/MIN/{1.73_M2}
GFR SERPL CREATININE-BSD FRML MDRD: ABNORMAL ML/MIN/{1.73_M2}
GLUCOSE BLD-MCNC: 376 MG/DL (ref 70–99)
HCT VFR BLD CALC: 41.5 % (ref 41–53)
HEMOGLOBIN: 13.8 G/DL (ref 13.5–17)
IMMATURE GRANULOCYTES: ABNORMAL %
IRON SATURATION: 41 % (ref 20–55)
IRON: 102 UG/DL (ref 59–158)
LYMPHOCYTES # BLD: 18 % (ref 24–44)
MCH RBC QN AUTO: 31.5 PG (ref 26–34)
MCHC RBC AUTO-ENTMCNC: 33.1 G/DL (ref 31–37)
MCV RBC AUTO: 95 FL (ref 80–100)
MONOCYTES # BLD: 9 % (ref 0–12)
MORPHOLOGY: ABNORMAL
PDW BLD-RTO: 14.5 % (ref 12.1–15.2)
PLATELET # BLD: 50 K/UL (ref 140–450)
PLATELET ESTIMATE: ABNORMAL
PMV BLD AUTO: 8.7 FL (ref 6–12)
POTASSIUM SERPL-SCNC: 4.4 MMOL/L (ref 3.7–5.3)
RBC # BLD: 4.37 M/UL (ref 4.5–5.9)
RBC # BLD: ABNORMAL 10*6/UL
SEG NEUTROPHILS: 69 % (ref 36–66)
SEGMENTED NEUTROPHILS ABSOLUTE COUNT: 2.28 K/UL (ref 1.8–7.7)
SODIUM BLD-SCNC: 139 MMOL/L (ref 135–144)
TOTAL IRON BINDING CAPACITY: 248 UG/DL (ref 250–450)
TOTAL PROTEIN: 7.2 G/DL (ref 6.4–8.3)
UNSATURATED IRON BINDING CAPACITY: 145.5 UG/DL (ref 112–347)
VITAMIN B-12: 690 PG/ML (ref 211–946)
WBC # BLD: 3.3 K/UL (ref 3.5–11)
WBC # BLD: ABNORMAL 10*3/UL

## 2017-12-12 PROCEDURE — 84238 ASSAY NONENDOCRINE RECEPTOR: CPT

## 2017-12-12 PROCEDURE — 82746 ASSAY OF FOLIC ACID SERUM: CPT

## 2017-12-12 PROCEDURE — 80053 COMPREHEN METABOLIC PANEL: CPT

## 2017-12-12 PROCEDURE — 36415 COLL VENOUS BLD VENIPUNCTURE: CPT

## 2017-12-12 PROCEDURE — 83540 ASSAY OF IRON: CPT

## 2017-12-12 PROCEDURE — 82728 ASSAY OF FERRITIN: CPT

## 2017-12-12 PROCEDURE — 85025 COMPLETE CBC W/AUTO DIFF WBC: CPT

## 2017-12-12 PROCEDURE — 83550 IRON BINDING TEST: CPT

## 2017-12-12 PROCEDURE — 82607 VITAMIN B-12: CPT

## 2017-12-14 LAB — SOLUBLE TRANSFERRIN RECEPT: 3.2 MG/L (ref 2.2–5)

## 2017-12-21 ENCOUNTER — OFFICE VISIT (OUTPATIENT)
Dept: ONCOLOGY | Age: 57
End: 2017-12-21
Payer: MEDICARE

## 2017-12-21 VITALS
BODY MASS INDEX: 22.91 KG/M2 | DIASTOLIC BLOOD PRESSURE: 69 MMHG | TEMPERATURE: 97.8 F | HEIGHT: 78 IN | RESPIRATION RATE: 17 BRPM | SYSTOLIC BLOOD PRESSURE: 126 MMHG | WEIGHT: 198 LBS | HEART RATE: 71 BPM

## 2017-12-21 DIAGNOSIS — D69.6 THROMBOCYTOPENIA (HCC): ICD-10-CM

## 2017-12-21 DIAGNOSIS — Z86.010 HISTORY OF COLON POLYPS: ICD-10-CM

## 2017-12-21 DIAGNOSIS — K76.6 PORTAL HYPERTENSION (HCC): ICD-10-CM

## 2017-12-21 DIAGNOSIS — D50.9 IRON DEFICIENCY ANEMIA, UNSPECIFIED IRON DEFICIENCY ANEMIA TYPE: ICD-10-CM

## 2017-12-21 DIAGNOSIS — I85.10 SECONDARY ESOPHAGEAL VARICES WITHOUT BLEEDING (HCC): ICD-10-CM

## 2017-12-21 DIAGNOSIS — D70.9 NEUTROPENIA, UNSPECIFIED TYPE (HCC): Primary | ICD-10-CM

## 2017-12-21 DIAGNOSIS — K74.60 CIRRHOSIS OF LIVER WITHOUT ASCITES, UNSPECIFIED HEPATIC CIRRHOSIS TYPE (HCC): ICD-10-CM

## 2017-12-21 PROCEDURE — G8484 FLU IMMUNIZE NO ADMIN: HCPCS | Performed by: INTERNAL MEDICINE

## 2017-12-21 PROCEDURE — 1036F TOBACCO NON-USER: CPT | Performed by: INTERNAL MEDICINE

## 2017-12-21 PROCEDURE — G8427 DOCREV CUR MEDS BY ELIG CLIN: HCPCS | Performed by: INTERNAL MEDICINE

## 2017-12-21 PROCEDURE — G8420 CALC BMI NORM PARAMETERS: HCPCS | Performed by: INTERNAL MEDICINE

## 2017-12-21 PROCEDURE — 3017F COLORECTAL CA SCREEN DOC REV: CPT | Performed by: INTERNAL MEDICINE

## 2017-12-21 PROCEDURE — 99213 OFFICE O/P EST LOW 20 MIN: CPT | Performed by: INTERNAL MEDICINE

## 2017-12-21 ASSESSMENT — ENCOUNTER SYMPTOMS
VOICE CHANGE: 0
CHEST TIGHTNESS: 0
EYE ITCHING: 0
TROUBLE SWALLOWING: 0
EYE DISCHARGE: 0
NAUSEA: 0
SHORTNESS OF BREATH: 0
VOMITING: 0
CONSTIPATION: 0
DIARRHEA: 0
ABDOMINAL PAIN: 0
COUGH: 0
COLOR CHANGE: 0
WHEEZING: 0
BLOOD IN STOOL: 0
EYE REDNESS: 0
SORE THROAT: 0

## 2017-12-21 NOTE — PROGRESS NOTES
[facility]ANS  [facility]  [address] [facility] [address]  Dept: [phone]  Dept Fax: [phone]  Cindy Schneider is a 61 y.o. male who presents today for follow up of his   Chief Complaint   Patient presents with    Follow-up     thrombocytopenia [facility])         HPI   59-year-old gentleman with history of mental retardation and microcephaly, hypertension and diabetes mellitus. He has been seeing me for leukopenia and thrombocytopenia secondary to cirrhosis of the liver with portal hypertension and esophageal varices. .  He had a band ligation for esophageal varices by Dr. Reid Kwong in the past.  He denies any bleeding or bruising at this time. His anemia has resolved but she continues to have leukopenia and thrombocytopenia. His iron studies as well as B12 and folate levels are normal     Current Outpatient Prescriptions   Medication Sig Dispense Refill    metFORMIN (GLUCOPHAGE-XR) 500 MG extended release tablet TAKE 2 TABLETS (1000MG) BY MOUTH TWICE DAILY 120 tablet 1    naproxen (NAPROSYN) 250 MG tablet TAKE 1 TABLET BY MOUTH ONCE DAILY AS NEEDED FOR PAIN OR HEADACHES *MUST CALL PHARMACY FOR REFILLS* [id number] 30 tablet 5    SM ANTACID/ANTIGAS 200-200-20 MG/5ML SUSP suspension TAKE 30ML BY MOUTH EVERY 4 HOURS AS NEEDED FOR STOMACH ACID,INDIGESTION OR HEARTBURN OR GAS *CALL PHARMACY FOR REFILLS* 335 mL 4    glipiZIDE (GLUCOTROL XL) 10 MG extended release tablet TAKE 1 TABLET BY MOUTH TWICE DAILY 180 tablet 0    simvastatin (ZOCOR) 20 MG tablet TAKE 1 TABLET BY MOUTH EVERY EVENING 90 tablet 0    terbinafine (LAMISIL) 1 % cream Apply topically 2 times daily. 1 Tube 2    chlorhexidine (PERIDEX) 0.12 % solution RINSE MOUTH WITH CHLORHEXIDINE ONCE DAILY *CALL PHARMACY FOR REFILLS* 473 mL 9    ONE TOUCH ULTRASOFT LANCETS MISC TEST BLOOD SUGAR ONCE WEEKLY AND ONE EXTRA TIME ON SATURDAYS.  DX: E11.9 100 each 3    ferrous sulfate 325 (65 Fe) MG tablet Take 1 tablet by mouth daily 30 tablet 1    glucose blood VI test strips (ONE TOUCH ULTRA TEST) strip Use as directed TID//Diagnosis Code E11.9 300 strip 1    metoprolol tartrate (LOPRESSOR) 25 MG tablet GIVE 1 TABLET BY MOUTH ONCE DAILY 31 tablet 5    loratadine (CLARITIN) 10 MG tablet TAKE 1 TABLET BY MOUTH ONCE DAILY 30 tablet 8    SALINE MIST 0.65 % nasal spray INSTILL 2 SPRAYS IN EACH NOSTRIL ONCE DAILY *CALL PHARMACY FOR REFILL* 1 Bottle 9    LANTUS SOLOSTAR 100 UNIT/ML injection pen INJECT 20 UNITS SUBCUTANEOUSLY ONCE DAILY *CALL FOR REFILLS* 15 Pen 3    BD ULTRA-FINE PEN NEEDLES 29G X 12.7MM MISC USE AS DIRECTED WITH LANTUS **MUST CALL PHARMACY FOR REFILL** 100 each 8    Disposable Gloves (LATEX GLOVES) MISC USE AS DIRECTED 100 each 10    Dimethicone (AVEENO DAILY MOISTURIZING) 1.25 % LOTN APPLY TOPICALLY TO FEET ONCE WEEKLY 1 Bottle 9    lisinopril (PRINIVIL;ZESTRIL) 2.5 MG tablet GIVE 1 TABLET BY MOUTH ONCE DAILY DX: KIDNEY FUNCTION PROTECTION 90 tablet 2    Multiple Vitamin (DAILY-BEHZAD) TABS TAKE 1 TABLET BY MOUTH ONCE DAILY 31 tablet 9    docusate sodium (COLACE) 100 MG capsule TAKE 1 CAPSULE BY MOUTH TWICE DAILY (LK: COLACE) 62 capsule 9    EPINEPHrine (EPIPEN 2-AGGIE) 0.3 MG/0.3ML SOAJ injection INJECT 1 PEN INTRAMUSCULARLY AS DIRECTED AS NEEDED FOR REACTION TO BEE STING *CALL PHARMACY FOR REFILLS* 2 each 2    UNABLE TO FIND PNEUMOVAX 23 / vaccine for pneumonia 1 Act 0    ONE TOUCH ULTRASOFT LANCETS MISC Test BS daily at 8am and 2 hours post supper weekly on Saturday . Dx. E 11.9 200 each 3    Hypromellose (GENTEAL MILD TO MODERATE OP) Place 1 drop into both eyes 2 times daily      MAPAP 325 MG tablet TAKE 2 TABS (650MG) BY MOUTH DAILY AS NEEDED FOR PAIN OR FEVER OVER 100 *CALL PHARMACY FOR REFILLS* OK TO CHARGE 62 tablet 10    miconazole (MICOTIN) 2 % powder Apply topically every morning Apply topically 2 times daily.       lamoTRIgine (LAMICTAL) 100 MG tablet Take 100 mg by mouth daily      Disposable Gloves scleral icterus. Neck: Neck supple. No thyromegaly present. Cardiovascular: Normal rate and regular rhythm. No murmur heard. Pulmonary/Chest: Effort normal and breath sounds normal. No respiratory distress. He has no wheezes. He has no rales. Abdominal: Soft. He exhibits no mass. There is no hepatosplenomegaly. There is no tenderness. Musculoskeletal: He exhibits no edema or tenderness. Lymphadenopathy:     He has no cervical adenopathy. He has no axillary adenopathy. Neurological: He is alert and oriented to person, place, and time. No cranial nerve deficit. Skin: Skin is warm and dry. No cyanosis. Nails show no clubbing. Psychiatric: He has a normal mood and affect. His behavior is normal. Thought content normal.   Nursing note and vitals reviewed.     Results for orders placed or performed during the hospital encounter of 12/12/17   CBC Auto Differential   Result Value Ref Range    WBC 3.3 (L) 3.5 - 11.0 k/uL    RBC 4.37 (L) 4.5 - 5.9 m/uL    Hemoglobin 13.8 13.5 - 17.0 g/dL    Hematocrit 41.5 41 - 53 %    MCV 95.0 80 - 100 fL    MCH 31.5 26 - 34 pg    MCHC 33.1 31 - 37 g/dL    RDW 14.5 12.1 - 15.2 %    Platelets 50 (L) 371 - 450 k/uL    MPV 8.7 6.0 - 12.0 fL    Differential Type NOT REPORTED     Immature Granulocytes NOT REPORTED 0 %    Absolute Immature Granulocyte NOT REPORTED 0.00 - 0.30 k/uL    WBC Morphology NOT REPORTED     RBC Morphology NOT REPORTED     Platelet Estimate NOT REPORTED     Seg Neutrophils 69 (H) 36 - 66 %    Lymphocytes 18 (L) 24 - 44 %    Monocytes 9 0 - 12 %    Eosinophils % 3 0 - 8 %    Basophils 1 0 - 2 %    Segs Absolute 2.28 1.8 - 7.7 k/uL    Absolute Lymph # 0.59 (L) 1.0 - 4.8 k/uL    Absolute Mono # 0.30 0.0 - 1.0 k/uL    Absolute Eos # 0.10 0.0 - 0.4 k/uL    Basophils # 0.03 0.0 - 0.2 k/uL    Morphology Platelet scan shows Decreased Platelets    Comprehensive Metabolic Panel   Result Value Ref Range    Glucose 376 (H) 70 - 99 mg/dL    BUN 14 6 - 20 mg/dL CREATININE 0.64 (L) 0.70 - 1.20 mg/dL    Bun/Cre Ratio 22 (H) 9 - 20    Calcium 9.4 8.6 - 10.4 mg/dL    Sodium 139 135 - 144 mmol/L    Potassium 4.4 3.7 - 5.3 mmol/L    Chloride 99 98 - 107 mmol/L    CO2 30 20 - 31 mmol/L    Anion Gap 10 9 - 17 mmol/L    Alkaline Phosphatase 122 40 - 129 U/L    ALT 29 5 - 41 U/L    AST 23 <40 U/L    Total Bilirubin 0.99 0.3 - 1.2 mg/dL    Total Protein 7.2 6.4 - 8.3 g/dL    Alb 4.0 3.5 - 5.2 g/dL    Albumin/Globulin Ratio 1.3 1.0 - 2.5    GFR Non-African American >60 >60 mL/min    GFR African American >60 >60 mL/min    GFR Comment          GFR Staging         Ferritin   Result Value Ref Range    Ferritin 227 30 - 400 ug/L   Iron and TIBC   Result Value Ref Range    Iron 102 59 - 158 ug/dL    TIBC 248 (L) 250 - 450 ug/dL    Iron Saturation 41 20 - 55 %    UIBC 145.5 112 - 347 ug/dL   Vitamin B12 & Folate   Result Value Ref Range    Vitamin B-12 690 211 - 946 pg/mL    Folate >20.0 >4.8 ng/mL   Soluble transferrin receptor   Result Value Ref Range    Soluble Transferrin Recept 3.2 2.2 - 5.0 mg/L       ASSESSMENT:     1. Neutropenia, unspecified type ([address]  CBC Auto Differential    Comprehensive Metabolic Panel   2. Thrombocytopenia (HCC)  CBC Auto Differential    Comprehensive Metabolic Panel   3. Iron deficiency anemia, unspecified iron deficiency anemia type     4. Cirrhosis of liver without ascites, unspecified hepatic cirrhosis type (HCC)  CBC Auto Differential    Comprehensive Metabolic Panel   5. Secondary esophageal varices without bleeding (HCC)  CBC Auto Differential    Comprehensive Metabolic Panel   6. History of colon polyps     7. Portal hypertension (HCC)  CBC Auto Differential    Comprehensive Metabolic Panel     Leukopenia and thrombocytopenia secondary to cirrhosis of the liver and possibly hypersplenism. His counts are stable at this time and has no complications of bleeding.   We will continue observation and see him back again in 3 months     PLAN:     Return in about 3 months (around 3/21/2018) for thrombocytopenia, leucopenia. Orders Placed This Encounter   Procedures    CBC Auto Differential     Standing Status:   Future     Standing Expiration Date:   12/21/2018    Comprehensive Metabolic Panel     Standing Status:   Future     Standing Expiration Date:   12/21/2018     No orders of the defined types were placed in this encounter.           Electronically signed by Manuel Hernandez MD on 12/21/2017 at 10:01 AM

## 2018-01-08 DIAGNOSIS — D69.6 THROMBOCYTOPENIA (HCC): ICD-10-CM

## 2018-01-08 DIAGNOSIS — K90.9 INTESTINAL MALABSORPTION, UNSPECIFIED TYPE: ICD-10-CM

## 2018-01-08 DIAGNOSIS — D50.9 IRON DEFICIENCY ANEMIA, UNSPECIFIED IRON DEFICIENCY ANEMIA TYPE: Primary | ICD-10-CM

## 2018-01-08 DIAGNOSIS — D70.9 NEUTROPENIA, UNSPECIFIED TYPE (HCC): ICD-10-CM

## 2018-01-08 RX ORDER — FERROUS SULFATE 325(65) MG
325 TABLET ORAL DAILY
Qty: 30 TABLET | Refills: 1 | Status: SHIPPED | OUTPATIENT
Start: 2018-01-08 | End: 2018-01-10 | Stop reason: SDUPTHER

## 2018-01-10 DIAGNOSIS — K90.9 INTESTINAL MALABSORPTION, UNSPECIFIED TYPE: ICD-10-CM

## 2018-01-10 DIAGNOSIS — D69.6 THROMBOCYTOPENIA (HCC): ICD-10-CM

## 2018-01-10 DIAGNOSIS — D50.9 IRON DEFICIENCY ANEMIA, UNSPECIFIED IRON DEFICIENCY ANEMIA TYPE: ICD-10-CM

## 2018-01-10 DIAGNOSIS — D70.9 NEUTROPENIA, UNSPECIFIED TYPE (HCC): ICD-10-CM

## 2018-01-10 RX ORDER — FERROUS SULFATE 325(65) MG
325 TABLET ORAL DAILY
Qty: 30 TABLET | Refills: 2 | Status: SHIPPED | OUTPATIENT
Start: 2018-01-10 | End: 2018-04-24 | Stop reason: SDUPTHER

## 2018-01-10 NOTE — TELEPHONE ENCOUNTER
79-01 Lj from [facility] called and stated Wanda Melton needed a refill on his ferrous sulfate 325mg or he will run out.

## 2018-04-12 ENCOUNTER — OFFICE VISIT (OUTPATIENT)
Dept: ONCOLOGY | Age: 58
End: 2018-04-12
Payer: MEDICARE

## 2018-04-12 VITALS
HEART RATE: 69 BPM | DIASTOLIC BLOOD PRESSURE: 67 MMHG | BODY MASS INDEX: 22.56 KG/M2 | WEIGHT: 195 LBS | RESPIRATION RATE: 18 BRPM | HEIGHT: 78 IN | SYSTOLIC BLOOD PRESSURE: 120 MMHG | TEMPERATURE: 98.6 F

## 2018-04-12 DIAGNOSIS — D69.6 THROMBOCYTOPENIA (HCC): Primary | ICD-10-CM

## 2018-04-12 DIAGNOSIS — D50.9 IRON DEFICIENCY ANEMIA, UNSPECIFIED IRON DEFICIENCY ANEMIA TYPE: ICD-10-CM

## 2018-04-12 DIAGNOSIS — D70.9 NEUTROPENIA, UNSPECIFIED TYPE (HCC): ICD-10-CM

## 2018-04-12 DIAGNOSIS — K74.60 CIRRHOSIS OF LIVER WITHOUT ASCITES, UNSPECIFIED HEPATIC CIRRHOSIS TYPE (HCC): ICD-10-CM

## 2018-04-12 DIAGNOSIS — Z86.010 HISTORY OF COLON POLYPS: ICD-10-CM

## 2018-04-12 DIAGNOSIS — I85.10 SECONDARY ESOPHAGEAL VARICES WITHOUT BLEEDING (HCC): ICD-10-CM

## 2018-04-12 PROCEDURE — G8420 CALC BMI NORM PARAMETERS: HCPCS | Performed by: INTERNAL MEDICINE

## 2018-04-12 PROCEDURE — 99213 OFFICE O/P EST LOW 20 MIN: CPT | Performed by: INTERNAL MEDICINE

## 2018-04-12 PROCEDURE — 1036F TOBACCO NON-USER: CPT | Performed by: INTERNAL MEDICINE

## 2018-04-12 PROCEDURE — 3017F COLORECTAL CA SCREEN DOC REV: CPT | Performed by: INTERNAL MEDICINE

## 2018-04-12 PROCEDURE — G8427 DOCREV CUR MEDS BY ELIG CLIN: HCPCS | Performed by: INTERNAL MEDICINE

## 2018-04-12 ASSESSMENT — ENCOUNTER SYMPTOMS
VOMITING: 0
EYE REDNESS: 0
COLOR CHANGE: 0
CONSTIPATION: 0
VOICE CHANGE: 0
SHORTNESS OF BREATH: 0
COUGH: 0
TROUBLE SWALLOWING: 0
EYE ITCHING: 0
SORE THROAT: 0
DIARRHEA: 0
ABDOMINAL PAIN: 0
CHEST TIGHTNESS: 0
NAUSEA: 0
EYE DISCHARGE: 0
BLOOD IN STOOL: 0
WHEEZING: 0

## 2018-04-16 ENCOUNTER — TELEPHONE (OUTPATIENT)
Dept: GASTROENTEROLOGY | Age: 58
End: 2018-04-16

## 2018-04-16 DIAGNOSIS — I85.10 SECONDARY ESOPHAGEAL VARICES WITHOUT BLEEDING (HCC): Primary | ICD-10-CM

## 2018-04-24 DIAGNOSIS — K90.9 INTESTINAL MALABSORPTION, UNSPECIFIED TYPE: ICD-10-CM

## 2018-04-24 DIAGNOSIS — D50.9 IRON DEFICIENCY ANEMIA, UNSPECIFIED IRON DEFICIENCY ANEMIA TYPE: ICD-10-CM

## 2018-04-24 DIAGNOSIS — D70.9 NEUTROPENIA, UNSPECIFIED TYPE (HCC): ICD-10-CM

## 2018-04-24 DIAGNOSIS — D69.6 THROMBOCYTOPENIA (HCC): ICD-10-CM

## 2018-04-24 RX ORDER — FERROUS SULFATE 325(65) MG
325 TABLET ORAL DAILY
Qty: 30 TABLET | Refills: 5 | Status: SHIPPED | OUTPATIENT
Start: 2018-04-24 | End: 2018-10-16 | Stop reason: SDUPTHER

## 2018-08-07 ENCOUNTER — TELEPHONE (OUTPATIENT)
Dept: GASTROENTEROLOGY | Age: 58
End: 2018-08-07

## 2018-08-09 ENCOUNTER — OFFICE VISIT (OUTPATIENT)
Dept: ONCOLOGY | Age: 58
End: 2018-08-09
Payer: MEDICARE

## 2018-08-09 VITALS
DIASTOLIC BLOOD PRESSURE: 68 MMHG | WEIGHT: 201 LBS | TEMPERATURE: 98.1 F | BODY MASS INDEX: 23.26 KG/M2 | SYSTOLIC BLOOD PRESSURE: 113 MMHG | HEIGHT: 78 IN | HEART RATE: 66 BPM | RESPIRATION RATE: 18 BRPM

## 2018-08-09 DIAGNOSIS — D69.6 THROMBOCYTOPENIA (HCC): Primary | ICD-10-CM

## 2018-08-09 DIAGNOSIS — I85.10 SECONDARY ESOPHAGEAL VARICES WITHOUT BLEEDING (HCC): ICD-10-CM

## 2018-08-09 DIAGNOSIS — D50.9 IRON DEFICIENCY ANEMIA, UNSPECIFIED IRON DEFICIENCY ANEMIA TYPE: ICD-10-CM

## 2018-08-09 DIAGNOSIS — K76.6 PORTAL HYPERTENSION (HCC): ICD-10-CM

## 2018-08-09 DIAGNOSIS — K74.60 CIRRHOSIS OF LIVER WITHOUT ASCITES, UNSPECIFIED HEPATIC CIRRHOSIS TYPE (HCC): ICD-10-CM

## 2018-08-09 DIAGNOSIS — Z86.010 HISTORY OF COLON POLYPS: ICD-10-CM

## 2018-08-09 PROCEDURE — G8420 CALC BMI NORM PARAMETERS: HCPCS | Performed by: INTERNAL MEDICINE

## 2018-08-09 PROCEDURE — 1036F TOBACCO NON-USER: CPT | Performed by: INTERNAL MEDICINE

## 2018-08-09 PROCEDURE — 3017F COLORECTAL CA SCREEN DOC REV: CPT | Performed by: INTERNAL MEDICINE

## 2018-08-09 PROCEDURE — G8427 DOCREV CUR MEDS BY ELIG CLIN: HCPCS | Performed by: INTERNAL MEDICINE

## 2018-08-09 PROCEDURE — 99214 OFFICE O/P EST MOD 30 MIN: CPT | Performed by: INTERNAL MEDICINE

## 2018-08-09 ASSESSMENT — ENCOUNTER SYMPTOMS
BLOOD IN STOOL: 0
ABDOMINAL PAIN: 0
CHEST TIGHTNESS: 0
COLOR CHANGE: 0
TROUBLE SWALLOWING: 0
WHEEZING: 0
EYE ITCHING: 0
DIARRHEA: 0
EYE DISCHARGE: 0
EYE REDNESS: 0
COUGH: 0
SHORTNESS OF BREATH: 0
SORE THROAT: 0
VOMITING: 0
VOICE CHANGE: 0
CONSTIPATION: 0
NAUSEA: 0

## 2018-08-09 NOTE — LETTER
 UPPER GASTROINTESTINAL ENDOSCOPY  08-    Dr. Sissy Roberson UPPER GASTROINTESTINAL ENDOSCOPY  11/4/13    Varicies banding x4    UPPER GASTROINTESTINAL ENDOSCOPY  6/23/14    UPPER GASTROINTESTINAL ENDOSCOPY  10-6-14    Dr. Jordan Hernandez ENDOSCOPY  1/20/15        UPPER GASTROINTESTINAL ENDOSCOPY  03/21/2016    -bx,portal HTN, varices    UPPER GASTROINTESTINAL ENDOSCOPY  09/19/2016    -esoph varices,hypertensive gastropathy    UPPER GASTROINTESTINAL ENDOSCOPY  03/14/2017    Dr Rachelle Bennette varices,gastric varices    UPPER GASTROINTESTINAL ENDOSCOPY  11/29/2017    -portal hypertension,esophageal varices       History reviewed. No pertinent family history. Social History   Substance Use Topics    Smoking status: Never Smoker    Smokeless tobacco: Never Used    Alcohol use No          The Past Medical History, Past Surgical History, Past Family History and Past Social History have been reviewed      Review of Systems   Constitutional: Positive for fatigue. Negative for activity change, appetite change, chills and fever. HENT: Negative for congestion, hearing loss, mouth sores, nosebleeds, sore throat, tinnitus, trouble swallowing and voice change. Eyes: Negative for discharge, redness, itching and visual disturbance. Respiratory: Negative for cough, chest tightness, shortness of breath and wheezing. Cardiovascular: Negative for chest pain and leg swelling. Gastrointestinal: Negative for abdominal pain, blood in stool, constipation, diarrhea, nausea and vomiting. Genitourinary: Negative for decreased urine volume, difficulty urinating, hematuria and urgency. Musculoskeletal: Negative for arthralgias, joint swelling and myalgias. Skin: Negative for color change, pallor and rash. Neurological: Negative for dizziness, weakness, light-headedness, numbness and headaches. 2. Cirrhosis of liver without ascites, unspecified hepatic cirrhosis type (HCC)  CBC Auto Differential    Comprehensive Metabolic Panel    Soluble transferrin receptor    Ferritin    Iron and TIBC    Vitamin B12 & Folate   3. Portal hypertension (HCC)  CBC Auto Differential    Comprehensive Metabolic Panel    Soluble transferrin receptor    Ferritin    Iron and TIBC    Vitamin B12 & Folate   4. Iron deficiency anemia, unspecified iron deficiency anemia type  CBC Auto Differential    Comprehensive Metabolic Panel    Soluble transferrin receptor    Ferritin    Iron and TIBC    Vitamin B12 & Folate   5. History of colon polyps     6. Secondary esophageal varices without bleeding (HCC)       Leukopenia and thrombocytopenia secondary to cirrhosis of the liver and possibly hypersplenism. His platelet count is lower this time but has no complications of bleeding. We will continue observation and see him back again in 2 months. PLAN:     Return in about 2 months (around 10/9/2018) for thrombocytopenia, cirrhosis of liver, leucopenia. Orders Placed This Encounter   Procedures    CBC Auto Differential     Standing Status:   Future     Standing Expiration Date:   8/9/2019    Comprehensive Metabolic Panel     Standing Status:   Future     Standing Expiration Date:   8/9/2019    Soluble transferrin receptor     Standing Status:   Future     Standing Expiration Date:   8/9/2019    Ferritin     Standing Status:   Future     Standing Expiration Date:   8/9/2019    Iron and TIBC     Standing Status:   Future     Standing Expiration Date:   8/9/2019     Order Specific Question:   Is Patient Fasting? Answer:   No     Order Specific Question:   No of Hours? Answer:   No    Vitamin B12 & Folate     Standing Status:   Future     Standing Expiration Date:   8/9/2019     No orders of the defined types were placed in this encounter.           Electronically signed by Norm Romero MD on 8/9/2018 at 9:27 AM If you have questions, please do not hesitate to call me. I look forward to following Alia Michael along with you.     Sincerely,        Shikha Chatterjee MD  Phone: [phone]

## 2018-08-15 ENCOUNTER — ANESTHESIA EVENT (OUTPATIENT)
Dept: OPERATING ROOM | Age: 58
End: 2018-08-15
Payer: MEDICARE

## 2018-08-15 ENCOUNTER — ANESTHESIA (OUTPATIENT)
Dept: OPERATING ROOM | Age: 58
End: 2018-08-15
Payer: MEDICARE

## 2018-08-15 ENCOUNTER — HOSPITAL ENCOUNTER (OUTPATIENT)
Age: 58
Setting detail: OUTPATIENT SURGERY
Discharge: HOME | End: 2018-08-15
Attending: INTERNAL MEDICINE | Admitting: INTERNAL MEDICINE
Payer: MEDICARE

## 2018-08-15 VITALS
DIASTOLIC BLOOD PRESSURE: 66 MMHG | RESPIRATION RATE: 11 BRPM | SYSTOLIC BLOOD PRESSURE: 108 MMHG | OXYGEN SATURATION: 98 %

## 2018-08-15 VITALS
HEIGHT: 77 IN | DIASTOLIC BLOOD PRESSURE: 64 MMHG | SYSTOLIC BLOOD PRESSURE: 103 MMHG | BODY MASS INDEX: 23.38 KG/M2 | TEMPERATURE: 97.4 F | WEIGHT: 198 LBS | RESPIRATION RATE: 16 BRPM | HEART RATE: 75 BPM | OXYGEN SATURATION: 95 %

## 2018-08-15 PROCEDURE — 7100000010 HC PHASE II RECOVERY - FIRST 15 MIN: Performed by: INTERNAL MEDICINE

## 2018-08-15 PROCEDURE — 6360000002 HC RX W HCPCS: Performed by: NURSE ANESTHETIST, CERTIFIED REGISTERED

## 2018-08-15 PROCEDURE — 2580000003 HC RX 258: Performed by: INTERNAL MEDICINE

## 2018-08-15 PROCEDURE — 3700000000 HC ANESTHESIA ATTENDED CARE: Performed by: INTERNAL MEDICINE

## 2018-08-15 PROCEDURE — 2500000003 HC RX 250 WO HCPCS: Performed by: NURSE ANESTHETIST, CERTIFIED REGISTERED

## 2018-08-15 PROCEDURE — 2709999900 HC NON-CHARGEABLE SUPPLY: Performed by: INTERNAL MEDICINE

## 2018-08-15 PROCEDURE — 43235 EGD DIAGNOSTIC BRUSH WASH: CPT | Performed by: INTERNAL MEDICINE

## 2018-08-15 PROCEDURE — 7100000011 HC PHASE II RECOVERY - ADDTL 15 MIN: Performed by: INTERNAL MEDICINE

## 2018-08-15 PROCEDURE — 3609017100 HC EGD: Performed by: INTERNAL MEDICINE

## 2018-08-15 RX ORDER — LIDOCAINE HYDROCHLORIDE 20 MG/ML
INJECTION, SOLUTION EPIDURAL; INFILTRATION; INTRACAUDAL; PERINEURAL PRN
Status: DISCONTINUED | OUTPATIENT
Start: 2018-08-15 | End: 2018-08-15 | Stop reason: SDUPTHER

## 2018-08-15 RX ORDER — SODIUM CHLORIDE, SODIUM LACTATE, POTASSIUM CHLORIDE, CALCIUM CHLORIDE 600; 310; 30; 20 MG/100ML; MG/100ML; MG/100ML; MG/100ML
INJECTION, SOLUTION INTRAVENOUS CONTINUOUS
Status: DISCONTINUED | OUTPATIENT
Start: 2018-08-15 | End: 2018-08-15 | Stop reason: HOSPADM

## 2018-08-15 RX ORDER — FENTANYL CITRATE 50 UG/ML
INJECTION, SOLUTION INTRAMUSCULAR; INTRAVENOUS PRN
Status: DISCONTINUED | OUTPATIENT
Start: 2018-08-15 | End: 2018-08-15 | Stop reason: SDUPTHER

## 2018-08-15 RX ORDER — PROPOFOL 10 MG/ML
INJECTION, EMULSION INTRAVENOUS PRN
Status: DISCONTINUED | OUTPATIENT
Start: 2018-08-15 | End: 2018-08-15 | Stop reason: SDUPTHER

## 2018-08-15 RX ADMIN — LIDOCAINE HYDROCHLORIDE 100 MG: 20 INJECTION, SOLUTION EPIDURAL; INFILTRATION; INTRACAUDAL; PERINEURAL at 09:50

## 2018-08-15 RX ADMIN — FENTANYL CITRATE 50 MCG: 50 INJECTION INTRAMUSCULAR; INTRAVENOUS at 09:44

## 2018-08-15 RX ADMIN — SODIUM CHLORIDE, POTASSIUM CHLORIDE, SODIUM LACTATE AND CALCIUM CHLORIDE: 600; 310; 30; 20 INJECTION, SOLUTION INTRAVENOUS at 09:16

## 2018-08-15 RX ADMIN — PROPOFOL 20 MG: 10 INJECTION, EMULSION INTRAVENOUS at 09:52

## 2018-08-15 RX ADMIN — PROPOFOL 100 MG: 10 INJECTION, EMULSION INTRAVENOUS at 09:50

## 2018-08-15 ASSESSMENT — PAIN SCALES - GENERAL
PAINLEVEL_OUTOF10: 0
PAINLEVEL_OUTOF10: 0

## 2018-08-15 ASSESSMENT — PAIN - FUNCTIONAL ASSESSMENT: PAIN_FUNCTIONAL_ASSESSMENT: 0-10

## 2018-08-15 NOTE — PROGRESS NOTES
Discharge Criteria    Inpatients must meet Criteria 1 through 7. All other patients are either YES or N/A. If a NO is chosen then Anesthesia or Surgeon must be notified. 1.  Minimum 30 minutes after last dose of sedative medication, minimum 120 minutes after last dose of reversal agent. Yes      2. Systolic BP stable within 20 mmHg for 30 minutes & systolic BP between 90 & 212 or within 10 mmHg of baseline. Yes      3. Pulse between 60 and 100 or within 10 bpm of baseline. Yes      4. Spontaneous respiratory rate >/= 10 per minute. Yes      5. SaO2 >/= 95 or  >/= baseline. Yes      6. Able to cough and swallow or return to baseline function. Yes      7. Alert and oriented or return to baseline mental status. Yes      8. Demonstrates controlled, coordinated movements, ambulates with steady gait, or return to baseline activity function. Yes      9. Minimal or no pain or nausea, or at a level tolerable and acceptable to patient. Yes      10. Takes and retains oral fluids as allowed. Yes      11. Procedural / perioperative site stable. Minimal or no bleeding. Yes          12. If GI endoscopy procedure, minimal or no abdominal distention or passing flatus. Yes      13. Written discharge instructions and emergency telephone number provided. Yes      14. Accompanied by a responsible adult. Yes      Adult patient discharged from facility without responsible person meets above criteria plus the following:   a) remains awake without stimulus for 30 minutes     b) oriented appropriate for age      c) all vital signs stable   d) no significant risk of losing protective reflexes      e) able to maintain pre-procedure mobility without assistance   f) no nausea or dizziness      g) transportation arrangements that do not require patient to operate motor Vehicle.      N/A

## 2018-08-15 NOTE — ANESTHESIA PRE PROCEDURE
chlorhexidine (PERIDEX) 0.12 % solution RINSE MOUTH WITH CHLORHEXIDINE ONCE DAILY *CALL PHARMACY FOR REFILLS* 10/23/17  Yes Fransisca Crenshaw MD   Dimethicone (AVEENO DAILY MOISTURIZING) 1.25 % LOTN APPLY TOPICALLY TO FEET ONCE WEEKLY 6/28/17  Yes Fransisca Crenshaw MD   Hypromellose (GENTEAL MILD TO MODERATE OP) Place 1 drop into both eyes 2 times daily   Yes Historical Provider, MD   miconazole (MICOTIN) 2 % powder Apply topically every morning Apply topically 2 times daily. Yes Historical Provider, MD   NONFORMULARY Sea salt nose spray 2 sprays each nostril daily   Yes Historical Provider, MD   Polyvinyl Alcohol-Povidone (FRESHKOTE OP) Apply 1 drop to eye 2 times daily. Yes Historical Provider, MD   Insulin Pen Needle (BD ULTRA-FINE PEN NEEDLES) 29G X 12.7MM MISC 1 each by Does not apply route daily 5/11/18   Fransisca Crenshaw MD   ferrous sulfate 325 (65 Fe) MG tablet Take 1 tablet by mouth daily 4/24/18   Laurell Klinefelter, MD   glucose blood VI test strips (ONE TOUCH ULTRA TEST) strip 1 each by Does not apply route 3 times daily 4/13/18   Fransisca Crenshaw MD   ONE TOUCH ULTRASOFT LANCETS MISC Test BS daily at 8am and 2 hours post supper weekly on Saturday . Dx. E11.9 1/9/18   Fransisca Crenshaw MD   naproxen (NAPROSYN) 250 MG tablet TAKE 1 TABLET BY MOUTH ONCE DAILY AS NEEDED FOR PAIN OR HEADACHES *MUST CALL PHARMACY FOR REFILLS* [id number] 12/15/17   Fransisca Crenshaw MD   terbinafine (LAMISIL) 1 % cream Apply topically 2 times daily.  10/27/17   SUZANNE Marinelli - CNP   Disposable Gloves (LATEX GLOVES) MISC USE AS DIRECTED 6/28/17   Fransisca Crenshaw MD   EPINEPHrine (EPIPEN 2-AGGIE) 0.3 MG/0.3ML SOAJ injection INJECT 1 PEN INTRAMUSCULARLY AS DIRECTED AS NEEDED FOR REACTION TO BEE STING *CALL PHARMACY FOR REFILLS* 4/25/17   Fransisca Crenshaw MD   UNABLE TO FIND PNEUMOVAX 23 / vaccine for pneumonia 3/27/17   Fransisca Crenshaw MD   MAPAP 325 MG tablet TAKE 2 TABS (650MG) BY MOUTH DAILY AS NEEDED  NASAL POLYP SURGERY      NOSE SURGERY      [place] COLON CA SCRN NOT  [address] IND N/A 11/29/2017    COLONOSCOPY performed by Reji Paulino MD at [address] ESOPHAGOGASTRODUODENOSCOPY TRANSORAL DIAGNOSTIC N/A 3/14/2017    EGD ESOPHAGOGASTRODUODENOSCOPY performed by Reji Paulino MD at [address] ESOPHAGOGASTRODUODENOSCOPY TRANSORAL DIAGNOSTIC N/A 11/29/2017    EGD ESOPHAGOGASTRODUODENOSCOPY performed by Reji Paulino MD at [address] [facility]  08-    Dr. Vimal White  11/4/13    Varicies banding x4    UPPER GASTROINTESTINAL ENDOSCOPY  6/23/14    UPPER GASTROINTESTINAL ENDOSCOPY  10-6-14    Dr. Mackenzie Mylar ENDOSCOPY  1/20/15        UPPER GASTROINTESTINAL ENDOSCOPY  03/21/2016    -bx,portal HTN, varices    UPPER GASTROINTESTINAL ENDOSCOPY  09/19/2016    -esoph varices,hypertensive gastropathy    UPPER GASTROINTESTINAL ENDOSCOPY  03/14/2017    Dr Darshan Rojo varices,gastric varices    UPPER GASTROINTESTINAL ENDOSCOPY  11/29/2017    -portal hypertension,esophageal varices       Social History:    Social History   Substance Use Topics    Smoking status: Never Smoker    Smokeless tobacco: Never Used    Alcohol use No                                Counseling given: Not Answered      Vital Signs (Current):   Vitals:    08/15/18 0859   BP: 124/83   Pulse: 67   Resp: 15   Temp: 35.9 °C (96.7 °F)   TempSrc: Temporal   SpO2: 96%   Weight: 198 lb (89.8 kg)   Height: 6' 5\" (1.956 m)                                              BP Readings from Last 3 Encounters:   08/15/18 124/83   08/09/18 113/68   05/21/18 109/63       NPO Status: Time of last liquid consumption: 2030                        Time of last solid consumption: 2000                        Date of last liquid consumption: 08/14/18                        Date of last solid food consumption: 08/14/18    BMI: Wt Readings from Last 3 Encounters:   08/15/18 198 lb (89.8 kg)   08/09/18 201 lb (91.2 kg)   05/21/18 196 lb (88.9 kg)     Body mass index is 23.48 kg/m². CBC:   Lab Results   Component Value Date    WBC 3.3 12/12/2017    RBC 4.37 12/12/2017    HGB 13.8 12/12/2017    HCT 41.5 12/12/2017    MCV 95.0 12/12/2017    RDW 14.5 12/12/2017    PLT 50 12/12/2017       CMP:   Lab Results   Component Value Date     05/15/2018    K 3.8 05/15/2018     05/15/2018    CO2 30 05/15/2018    BUN 11 05/15/2018    CREATININE 0.78 05/15/2018    GFRAA >60 12/12/2017    LABGLOM >60 12/12/2017    GLUCOSE 161 05/15/2018    GLUCOSE 88 02/08/2012    PROT 7.2 12/12/2017    CALCIUM 9.3 05/15/2018    BILITOT 0.8 05/15/2018    ALKPHOS 90 05/15/2018    AST 24 05/15/2018    ALT 24 05/15/2018       POC Tests: No results for input(s): POCGLU, POCNA, POCK, POCCL, POCBUN, POCHEMO, POCHCT in the last 72 hours.     Coags: No results found for: PROTIME, INR, APTT    HCG (If Applicable): No results found for: PREGTESTUR, PREGSERUM, HCG, HCGQUANT     ABGs: No results found for: PHART, PO2ART, SYY2RUR, URP4SGU, BEART, F2YHJUEB     Type & Screen (If Applicable):  No results found for: LABABO, [address]    Anesthesia Evaluation  Patient summary reviewed and Nursing notes reviewed  Airway:        Comment: uncooperative   Dental:          Pulmonary:Negative Pulmonary ROS and normal exam  breath sounds clear to auscultation                             Cardiovascular:  Exercise tolerance: good (>4 METS),   (+) hypertension: mild, hyperlipidemia        Rhythm: regular  Rate: normal                    Neuro/Psych:   Negative Neuro/Psych ROS  (+) psychiatric history: stable with treatment            GI/Hepatic/Renal:   (+) liver disease: portal hypertension, esophageal varices,           Endo/Other:    (+) DiabetesType II DM, poorly controlled, , blood dyscrasia: anemia and thrombocytopenia:., .                  ROS comment: h/o thrombocytopenia Abdominal:

## 2018-08-15 NOTE — OP NOTE
PROCEDURE NOTE    DATE OF PROCEDURE: 8/15/2018     ENDOSCOPIST: Ольга Ba MD, Cintia Negrete    ASSISTANT: None    PREOPERATIVE DIAGNOSIS: portal hypertension with esophageal varices    POSTOPERATIVE DIAGNOSIS: -Same    OPERATION: EGD --diagnostic    ANESTHESIA: MAC     ESTIMATED BLOOD LOSS:  None    COMPLICATIONS: None. SPECIMENS: were not obtained    HISTORY: The patient is a 60y.o. year old male with history of above preop diagnosis. Esophagogastroduodenoscopy with possible biopsy and dilation has been recommended. I explained the risk, benefits, expected outcome, and alternatives to the procedure. Risks include but are not limited to bleeding, infection, respiratory distress, hypotension, and perforation of the esophagus, stomach, or duodenum. Patient understands and is in agreement. PROCEDURE: The patient was given monitored anesthesia care. The patient was given oxygen by nasal cannula. The endoscope was inserted orally and advanced under direct vision through the esophagus, through the stomach, through the pylorus, and into the descending duodenum. Findings:  Duodenum:     Descending: normal    Bulb: normal    Stomach:    Antrum: normal    Body: normal    Fundus: abnormal: Portal hypertensive gastropathy    Esophagus: abnormal: Multiple columns of grade 2 esophageal varices with distal scarring from prior treatment    The scope was removed and the patient tolerated the procedure well.        Electronically signed by Brandon Go MD  on 8/15/2018 at 10:00 AM

## 2018-08-15 NOTE — H&P
History and Physical    Patient's Name/Date of Birth: Blaze Reyna / 1960 (89 y.o.)    Date: August 15, 2018     CHIEF COMPLAINT:  Portal hypertension with esophageal varices    Past Medical History:   Diagnosis Date    Cataracts, bilateral     Cirrhosis ([address]     DM type 2 (diabetes mellitus, type 2) ([address]     Esophageal varices without bleeding ([address]     Essential hypertension     Hepatitis     A and B    Mental disability     Mixed hyperlipidemia     [facility]     TB (tuberculosis)      Past Surgical History:   Procedure Laterality Date    CATARACT REMOVAL  1977    CHOLECYSTECTOMY  2007    COLONOSCOPY  03/21/2016    -polyps,diverticulosis,hemorrhoids    COLONOSCOPY  11/29/2017    -hemorrhoids    ENDOSCOPY, COLON, DIAGNOSTIC      EGD numerous times    ENDOSCOPY, COLON, DIAGNOSTIC      most recent 11-05-12    EYE SURGERY      bilateral cataracts    NASAL POLYP SURGERY      NOSE SURGERY      IN COLON CA SCRN NOT  [address] IND N/A 11/29/2017    COLONOSCOPY performed by Teresa Delacruz MD at [address] ESOPHAGOGASTRODUODENOSCOPY TRANSORAL DIAGNOSTIC N/A 3/14/2017    EGD ESOPHAGOGASTRODUODENOSCOPY performed by Teresa Delacruz MD at [address] ESOPHAGOGASTRODUODENOSCOPY TRANSORAL DIAGNOSTIC N/A 11/29/2017    EGD ESOPHAGOGASTRODUODENOSCOPY performed by Teresa Delacruz MD at [address] [facility] [place]  08-    Dr. Liza Domingo  11/4/13    Varicies banding x4    UPPER GASTROINTESTINAL ENDOSCOPY  6/23/14    UPPER GASTROINTESTINAL ENDOSCOPY  10-6-14    Dr. Dyson Abts ENDOSCOPY  1/20/15        UPPER GASTROINTESTINAL ENDOSCOPY  03/21/2016    -bx,portal HTN, varices    UPPER GASTROINTESTINAL ENDOSCOPY  09/19/2016    -esoph varices,hypertensive gastropathy    UPPER GASTROINTESTINAL ENDOSCOPY  03/14/2017    Dr Jp Gutierrez varices,gastric

## 2018-09-18 PROBLEM — I73.9 PVD (PERIPHERAL VASCULAR DISEASE) (HCC): Status: ACTIVE | Noted: 2018-09-18

## 2018-10-11 ENCOUNTER — OFFICE VISIT (OUTPATIENT)
Dept: ONCOLOGY | Age: 58
End: 2018-10-11
Payer: MEDICARE

## 2018-10-11 VITALS
SYSTOLIC BLOOD PRESSURE: 109 MMHG | HEIGHT: 77 IN | BODY MASS INDEX: 23.62 KG/M2 | HEART RATE: 67 BPM | RESPIRATION RATE: 16 BRPM | TEMPERATURE: 98 F | WEIGHT: 200 LBS | DIASTOLIC BLOOD PRESSURE: 57 MMHG

## 2018-10-11 DIAGNOSIS — D50.9 IRON DEFICIENCY ANEMIA, UNSPECIFIED IRON DEFICIENCY ANEMIA TYPE: ICD-10-CM

## 2018-10-11 DIAGNOSIS — K90.9 INTESTINAL MALABSORPTION, UNSPECIFIED TYPE: ICD-10-CM

## 2018-10-11 DIAGNOSIS — D69.6 THROMBOCYTOPENIA (HCC): Primary | ICD-10-CM

## 2018-10-11 DIAGNOSIS — Z86.010 HISTORY OF COLON POLYPS: ICD-10-CM

## 2018-10-11 DIAGNOSIS — K76.6 PORTAL HYPERTENSION (HCC): ICD-10-CM

## 2018-10-11 DIAGNOSIS — K74.60 CIRRHOSIS OF LIVER WITHOUT ASCITES, UNSPECIFIED HEPATIC CIRRHOSIS TYPE (HCC): ICD-10-CM

## 2018-10-11 PROCEDURE — 3017F COLORECTAL CA SCREEN DOC REV: CPT | Performed by: INTERNAL MEDICINE

## 2018-10-11 PROCEDURE — G8420 CALC BMI NORM PARAMETERS: HCPCS | Performed by: INTERNAL MEDICINE

## 2018-10-11 PROCEDURE — 1036F TOBACCO NON-USER: CPT | Performed by: INTERNAL MEDICINE

## 2018-10-11 PROCEDURE — 99214 OFFICE O/P EST MOD 30 MIN: CPT | Performed by: INTERNAL MEDICINE

## 2018-10-11 PROCEDURE — G8427 DOCREV CUR MEDS BY ELIG CLIN: HCPCS | Performed by: INTERNAL MEDICINE

## 2018-10-11 PROCEDURE — G8484 FLU IMMUNIZE NO ADMIN: HCPCS | Performed by: INTERNAL MEDICINE

## 2018-10-11 ASSESSMENT — ENCOUNTER SYMPTOMS
NAUSEA: 0
ABDOMINAL PAIN: 0
WHEEZING: 0
VOMITING: 0
SHORTNESS OF BREATH: 0
COUGH: 0
VOICE CHANGE: 0
CHEST TIGHTNESS: 0
DIARRHEA: 0
EYE ITCHING: 0
EYE REDNESS: 0
COLOR CHANGE: 0
EYE DISCHARGE: 0
CONSTIPATION: 0
BLOOD IN STOOL: 0
SORE THROAT: 0
TROUBLE SWALLOWING: 0

## 2018-10-11 NOTE — PROGRESS NOTES
5    sodium chloride (SALINE MIST) 0.65 % nasal spray 2 sprays by Nasal route daily 1 Bottle 9    docusate sodium (COLACE) 100 MG capsule Take 1 capsule by mouth 2 times daily 62 capsule 8    Multiple Vitamin (DAILY-BEHZAD) TABS Take 1 tablet by mouth daily 31 tablet 8    loratadine (CLARITIN) 10 MG tablet Take 1 tablet by mouth daily 30 tablet 7    simvastatin (ZOCOR) 20 MG tablet Take 1 tablet by mouth nightly 90 tablet 3    SM ANTACID/ANTIGAS 200-200-20 MG/5ML SUSP suspension TAKE 30ML BY MOUTH EVERY 4 HOURS AS NEEDED FOR STOMACH ACID,INDIGESTION OR HEARTBURN OR GAS *CALL PHARMACY FOR REFILLS* 335 mL 4    chlorhexidine (PERIDEX) 0.12 % solution RINSE MOUTH WITH CHLORHEXIDINE ONCE DAILY *CALL PHARMACY FOR REFILLS* 473 mL 9    Dimethicone (AVEENO DAILY MOISTURIZING) 1.25 % LOTN APPLY TOPICALLY TO FEET ONCE WEEKLY 1 Bottle 9    Hypromellose (GENTEAL MILD TO MODERATE OP) Place 1 drop into both eyes 2 times daily      MAPAP 325 MG tablet TAKE 2 TABS (650MG) BY MOUTH DAILY AS NEEDED FOR PAIN OR FEVER OVER 100 *CALL PHARMACY FOR REFILLS* OK TO CHARGE 62 tablet 10    NONFORMULARY Sea salt nose spray 2 sprays each nostril daily      Dimethicone (AVEENO DAILY MOISTURIZING) 1.25 % LOTN Apply topically to bilat feet once weekly 227 g 9    Insulin Pen Needle (BD ULTRA-FINE PEN NEEDLES) 29G X 12.7MM MISC 1 each by Does not apply route daily 100 each 8    glucose blood VI test strips (ONE TOUCH ULTRA TEST) strip 1 each by Does not apply route 3 times daily 300 strip 1    ONE TOUCH ULTRASOFT LANCETS MISC Test BS daily at 8am and 2 hours post supper weekly on Saturday . Dx. E11.9 200 each 3    naproxen (NAPROSYN) 250 MG tablet TAKE 1 TABLET BY MOUTH ONCE DAILY AS NEEDED FOR PAIN OR HEADACHES *MUST CALL PHARMACY FOR REFILLS* [id number] 30 tablet 5    terbinafine (LAMISIL) 1 % cream Apply topically 2 times daily.  1 Tube 2    Disposable Gloves (LATEX GLOVES) MISC USE AS DIRECTED 100 each 10    miconazole (MICOTIN) 2 % powder Apply topically every morning Apply topically 2 times daily.  Disposable Gloves (LATEX GLOVES ONE SIZE) MISC Use as directed. Dispense 1 box 1 each 6    Polyvinyl Alcohol-Povidone (FRESHKOTE OP) Apply 1 drop to eye 2 times daily.  Throat Lozenges (HALLS COUGH DROPS MT) Take 1 lozenge by mouth every 4 hours as needed. No current facility-administered medications for this visit.         Allergies   Allergen Reactions    Bee Venom Swelling       Past Medical History:   Diagnosis Date    Cataracts, bilateral     Cirrhosis ([address]     DM type 2 (diabetes mellitus, type 2) ([address]     [facility]     Essential hypertension     Hepatitis     A and B    Mental disability     Mixed hyperlipidemia     Portal hypertension ([facility]     TB (tuberculosis)         Past Surgical History:   Procedure Laterality Date    CATARACT REMOVAL  1977    CHOLECYSTECTOMY  2007    COLONOSCOPY  03/21/2016    -polyps,diverticulosis,hemorrhoids    COLONOSCOPY  11/29/2017    -hemorrhoids    ENDOSCOPY, COLON, DIAGNOSTIC      EGD numerous times    ENDOSCOPY, COLON, DIAGNOSTIC      most recent 11-05-12    EYE SURGERY      bilateral cataracts    NASAL POLYP SURGERY      NOSE SURGERY      [place] COLON CA SCRN NOT  [address]  IND N/A 11/29/2017    COLONOSCOPY performed by Marichuy Sagastume MD at [address] ESOPHAGOGASTRODUODENOSCOPY TRANSORAL DIAGNOSTIC N/A 3/14/2017    EGD ESOPHAGOGASTRODUODENOSCOPY performed by Marichuy Sagastume MD at [address] ESOPHAGOGASTRODUODENOSCOPY TRANSORAL DIAGNOSTIC N/A 11/29/2017    EGD ESOPHAGOGASTRODUODENOSCOPY performed by Marichuy Sagastume MD at [address] ESOPHAGOGASTRODUODENOSCOPY TRANSORAL DIAGNOSTIC N/A 8/15/2018    EGD ESOPHAGOGASTRODUODENOSCOPY performed by Marichuy Sagastume MD at [address] [facility]  08-    Dr. Benny Coates  11/4/13    Varicies banding x4    Psychiatric/Behavioral: Negative for behavioral problems and confusion. Physical Exam   Constitutional: He is oriented to person, place, and time. He appears well-developed and well-nourished. No distress. HENT:   Head: Normocephalic. Eyes: Pupils are equal, round, and reactive to light. No scleral icterus. Neck: Neck supple. No thyromegaly present. Cardiovascular: Normal rate and regular rhythm. No murmur heard. Pulmonary/Chest: Effort normal and breath sounds normal. No respiratory distress. He has no wheezes. He has no rales. Abdominal: Soft. He exhibits no mass. There is no hepatosplenomegaly. There is no tenderness. Musculoskeletal: He exhibits no edema or tenderness. Lymphadenopathy:     He has no cervical adenopathy. He has no axillary adenopathy. Neurological: He is alert and oriented to person, place, and time. No cranial nerve deficit. Skin: Skin is warm and dry. No cyanosis. Nails show no clubbing. Psychiatric: He has a normal mood and affect. His behavior is normal. Thought content normal.   Nursing note and vitals reviewed. Results for orders placed or performed in visit on 57/69/57   Basic Metabolic Panel, Fasting   Result Value Ref Range    Sodium 142 mmol/L    Potassium 3.9 mmol/L    Chloride 105 mmol/L    CO2 28 mmol/L    Glucose, Fasting 115 mg/dL    Calcium 9.2 mg/dL    BUN 13 mg/dL    CREATININE 0.80     Bun/Cre Ratio      Anion Gap 9 mmol/L    GFR Non-African American      GFR African American     Lipid, Fasting   Result Value Ref Range    Cholesterol, Fasting 94     Triglyceride, Fasting 88     HDL 44 35 - 70 mg/dL    LDL Calculated 32 0 - 160 mg/dL   Microalbumin, Ur   Result Value Ref Range    Microalb, Ur 0.8     Creatinine, Ur 167.56 mg/dL   Hemoglobin A1C   Result Value Ref Range    Hemoglobin A1C 7.7 %    AVERAGE GLUCOSE 174        ASSESSMENT:      Diagnosis Orders   1.  Thrombocytopenia (HCC)  CBC Auto Differential    Comprehensive Metabolic Panel

## 2018-10-16 ENCOUNTER — TELEPHONE (OUTPATIENT)
Dept: ONCOLOGY | Age: 58
End: 2018-10-16

## 2018-10-16 DIAGNOSIS — K90.9 INTESTINAL MALABSORPTION, UNSPECIFIED TYPE: ICD-10-CM

## 2018-10-16 DIAGNOSIS — D70.9 NEUTROPENIA, UNSPECIFIED TYPE (HCC): ICD-10-CM

## 2018-10-16 DIAGNOSIS — D69.6 THROMBOCYTOPENIA (HCC): ICD-10-CM

## 2018-10-16 DIAGNOSIS — D50.9 IRON DEFICIENCY ANEMIA, UNSPECIFIED IRON DEFICIENCY ANEMIA TYPE: ICD-10-CM

## 2018-10-16 RX ORDER — FERROUS SULFATE 325(65) MG
325 TABLET ORAL DAILY
Qty: 30 TABLET | Refills: 5 | Status: SHIPPED | OUTPATIENT
Start: 2018-10-16 | End: 2019-04-16 | Stop reason: SDUPTHER

## 2018-12-19 ENCOUNTER — OFFICE VISIT (OUTPATIENT)
Dept: GASTROENTEROLOGY | Age: 58
End: 2018-12-19
Payer: MEDICARE

## 2018-12-19 VITALS
SYSTOLIC BLOOD PRESSURE: 125 MMHG | WEIGHT: 199.6 LBS | DIASTOLIC BLOOD PRESSURE: 66 MMHG | BODY MASS INDEX: 23.57 KG/M2 | HEART RATE: 68 BPM | HEIGHT: 77 IN | RESPIRATION RATE: 18 BRPM | TEMPERATURE: 97.8 F

## 2018-12-19 DIAGNOSIS — I85.10 SECONDARY ESOPHAGEAL VARICES WITHOUT BLEEDING (HCC): ICD-10-CM

## 2018-12-19 DIAGNOSIS — K74.60 CIRRHOSIS OF LIVER WITHOUT ASCITES, UNSPECIFIED HEPATIC CIRRHOSIS TYPE (HCC): ICD-10-CM

## 2018-12-19 PROCEDURE — G8427 DOCREV CUR MEDS BY ELIG CLIN: HCPCS | Performed by: INTERNAL MEDICINE

## 2018-12-19 PROCEDURE — 3017F COLORECTAL CA SCREEN DOC REV: CPT | Performed by: INTERNAL MEDICINE

## 2018-12-19 PROCEDURE — G8484 FLU IMMUNIZE NO ADMIN: HCPCS | Performed by: INTERNAL MEDICINE

## 2018-12-19 PROCEDURE — 1036F TOBACCO NON-USER: CPT | Performed by: INTERNAL MEDICINE

## 2018-12-19 PROCEDURE — 99213 OFFICE O/P EST LOW 20 MIN: CPT | Performed by: INTERNAL MEDICINE

## 2018-12-19 PROCEDURE — G8420 CALC BMI NORM PARAMETERS: HCPCS | Performed by: INTERNAL MEDICINE

## 2018-12-19 NOTE — PATIENT INSTRUCTIONS
SURVEY:    You may be receiving a survey from [employer] regarding your visit today. Please complete the survey to enable us to provide the highest quality of care to you and your family. If you cannot score us a very good on any question, please call the office to discuss how we could have made your experience a very good one. Thank you.

## 2018-12-19 NOTE — PROGRESS NOTES
Kimberly Lane is a 60 y.o. male   YOB: 1960    Blood pressure 125/66, pulse 68, temperature 97.8 °F (36.6 °C), temperature source Tympanic, resp. rate 18, height 6' 5\" (1.956 m), weight 199 lb 9.6 oz (90.5 kg). Body mass index is 23.67 kg/m². Mr. Reena Tolliver is a middle-aged gentleman with cirrhosis and known portal hypertension. He has a previous history of esophageal variceal ligation and was last endoscoped on 8/15/2018. That examination showed multiple columns of grade 2 varices with distal scarring from prior etiology. No banding was done at that time and recommendations made for follow-up EGD in 6 months. He is stable from a GI standpoint with no symptoms referable to liver disease. There is no change in his general health history nor is he on any new medications.     Past Medical History:   Diagnosis Date    Cataracts, bilateral     Cirrhosis ([address]     DM type 2 (diabetes mellitus, type 2) ([address]     Esophageal varices without bleeding ([address]     Essential hypertension     Hepatitis     A and B    Mental disability     Mixed hyperlipidemia     Portal hypertension ([address]     TB (tuberculosis)         Past Surgical History:   Procedure Laterality Date    CATARACT REMOVAL  1977    CHOLECYSTECTOMY  2007    COLONOSCOPY  03/21/2016    -polyps,diverticulosis,hemorrhoids    COLONOSCOPY  11/29/2017    -hemorrhoids    ENDOSCOPY, COLON, DIAGNOSTIC      EGD numerous times    ENDOSCOPY, COLON, DIAGNOSTIC      most recent 11-05-12    EYE SURGERY      bilateral cataracts    NASAL POLYP SURGERY      NOSE SURGERY      [place] COLON CA SCRN NOT  [address] [facility] N/A 11/29/2017    COLONOSCOPY performed by Raymond Orona MD at [address] ESOPHAGOGASTRODUODENOSCOPY TRANSORAL DIAGNOSTIC N/A 3/14/2017    EGD ESOPHAGOGASTRODUODENOSCOPY performed by Raymond Orona MD at [address] ESOPHAGOGASTRODUODENOSCOPY TRANSORAL DIAGNOSTIC N/A 11/29/2017    EGD ESOPHAGOGASTRODUODENOSCOPY performed by Neel Downing MD at [address] [facility] [address] ESOPHAGOGASTRODUODENOSCOPY TRANSORAL DIAGNOSTIC N/A 8/15/2018    EGD ESOPHAGOGASTRODUODENOSCOPY performed by Neel Downing MD at [facility]  08-    Dr. Cuong Saldana  11/4/13    Varicies banding x4    UPPER GASTROINTESTINAL ENDOSCOPY  6/23/14    UPPER GASTROINTESTINAL ENDOSCOPY  10-6-14    Dr. Falcon Jersey ENDOSCOPY  1/20/15        UPPER GASTROINTESTINAL ENDOSCOPY  03/21/2016    -bx,portal HTN, varices    UPPER GASTROINTESTINAL ENDOSCOPY  09/19/2016    -esoph varices,hypertensive gastropathy    UPPER GASTROINTESTINAL ENDOSCOPY  03/14/2017    Dr Redmond Books varices,gastric varices    UPPER GASTROINTESTINAL ENDOSCOPY  11/29/2017    -portal hypertension,esophageal varices    UPPER GASTROINTESTINAL ENDOSCOPY  08/15/2018    Dr. Cristobal Espino hypertension [facility] esophageal varices        History reviewed. No pertinent family history. Social History     Social History    Marital status: Single     Spouse name: N/A    Number of children: N/A    Years of education: N/A     Occupational History    Not on file.      Social History Main Topics    Smoking status: Never Smoker    Smokeless tobacco: Never Used    Alcohol use No    Drug use: No    Sexual activity: Not on file     Other Topics Concern    Not on file     Social History Narrative    No narrative on file       Outpatient Prescriptions Marked as Taking for the 12/19/18 encounter (Office Visit) with Neel Downing MD   Medication Sig Dispense Refill    dextran 70-hypromellose (GENTEAL TEARS) 0.1-0.3 % SOLN opthalmic solution Place 1 drop into both eyes 3 times daily      lisinopril (PRINIVIL;ZESTRIL) 2.5 MG tablet Take 1 tablet by mouth daily 90 tablet 0    loratadine (CLARITIN) 10 MG tablet Take 1 tablet by mouth daily 30 tablet 6    chlorhexidine

## 2019-01-04 LAB
ALBUMIN SERPL-MCNC: 3.8 G/DL
ALP BLD-CCNC: 71 U/L
ALT SERPL-CCNC: 20 U/L
ANION GAP SERPL CALCULATED.3IONS-SCNC: 8 MMOL/L
AST SERPL-CCNC: 22 U/L
BILIRUB SERPL-MCNC: 1.1 MG/DL (ref 0.1–1.4)
BUN BLDV-MCNC: 12 MG/DL
CALCIUM SERPL-MCNC: 9.3 MG/DL
CHLORIDE BLD-SCNC: 105 MMOL/L
CO2: 29 MMOL/L
CREAT SERPL-MCNC: 0.82 MG/DL
GFR CALCULATED: NORMAL
GLUCOSE BLD-MCNC: 145 MG/DL
POTASSIUM SERPL-SCNC: 4 MMOL/L
SODIUM BLD-SCNC: 142 MMOL/L
TOTAL PROTEIN: 6.9

## 2019-01-11 DIAGNOSIS — D69.6 THROMBOCYTOPENIA (HCC): ICD-10-CM

## 2019-01-11 DIAGNOSIS — D50.9 IRON DEFICIENCY ANEMIA, UNSPECIFIED IRON DEFICIENCY ANEMIA TYPE: ICD-10-CM

## 2019-01-11 DIAGNOSIS — K74.60 CIRRHOSIS OF LIVER WITHOUT ASCITES, UNSPECIFIED HEPATIC CIRRHOSIS TYPE (HCC): ICD-10-CM

## 2019-01-14 ENCOUNTER — OFFICE VISIT (OUTPATIENT)
Dept: ONCOLOGY | Age: 59
End: 2019-01-14
Payer: MEDICARE

## 2019-01-14 VITALS
BODY MASS INDEX: 23.84 KG/M2 | TEMPERATURE: 97.5 F | SYSTOLIC BLOOD PRESSURE: 128 MMHG | HEART RATE: 64 BPM | WEIGHT: 201 LBS | DIASTOLIC BLOOD PRESSURE: 71 MMHG | RESPIRATION RATE: 16 BRPM

## 2019-01-14 DIAGNOSIS — D50.9 IRON DEFICIENCY ANEMIA, UNSPECIFIED IRON DEFICIENCY ANEMIA TYPE: ICD-10-CM

## 2019-01-14 DIAGNOSIS — I85.10 SECONDARY ESOPHAGEAL VARICES WITHOUT BLEEDING (HCC): ICD-10-CM

## 2019-01-14 DIAGNOSIS — D69.6 THROMBOCYTOPENIA (HCC): ICD-10-CM

## 2019-01-14 DIAGNOSIS — K74.60 CIRRHOSIS OF LIVER WITHOUT ASCITES, UNSPECIFIED HEPATIC CIRRHOSIS TYPE (HCC): ICD-10-CM

## 2019-01-14 DIAGNOSIS — D69.6 THROMBOCYTOPENIA (HCC): Primary | ICD-10-CM

## 2019-01-14 DIAGNOSIS — K76.6 PORTAL HYPERTENSION (HCC): ICD-10-CM

## 2019-01-14 PROCEDURE — G8427 DOCREV CUR MEDS BY ELIG CLIN: HCPCS | Performed by: INTERNAL MEDICINE

## 2019-01-14 PROCEDURE — G8484 FLU IMMUNIZE NO ADMIN: HCPCS | Performed by: INTERNAL MEDICINE

## 2019-01-14 PROCEDURE — 99214 OFFICE O/P EST MOD 30 MIN: CPT | Performed by: INTERNAL MEDICINE

## 2019-01-14 PROCEDURE — 1036F TOBACCO NON-USER: CPT | Performed by: INTERNAL MEDICINE

## 2019-01-14 PROCEDURE — G8420 CALC BMI NORM PARAMETERS: HCPCS | Performed by: INTERNAL MEDICINE

## 2019-01-14 PROCEDURE — 3017F COLORECTAL CA SCREEN DOC REV: CPT | Performed by: INTERNAL MEDICINE

## 2019-01-14 RX ORDER — FERROUS SULFATE 325(65) MG
325 TABLET ORAL DAILY
Qty: 30 TABLET | Refills: 3 | Status: SHIPPED | OUTPATIENT
Start: 2019-01-14 | End: 2019-01-18

## 2019-01-14 ASSESSMENT — ENCOUNTER SYMPTOMS
NAUSEA: 0
COUGH: 0
BLOOD IN STOOL: 0
CONSTIPATION: 0
TROUBLE SWALLOWING: 0
EYE DISCHARGE: 0
VOMITING: 0
SHORTNESS OF BREATH: 0
SORE THROAT: 0
COLOR CHANGE: 0
EYE ITCHING: 0
CHEST TIGHTNESS: 0
VOICE CHANGE: 0
WHEEZING: 0
ABDOMINAL PAIN: 0
EYE REDNESS: 0
DIARRHEA: 0

## 2019-01-18 PROBLEM — M75.41 ROTATOR CUFF IMPINGEMENT SYNDROME OF RIGHT SHOULDER: Status: ACTIVE | Noted: 2019-01-18

## 2019-02-12 ENCOUNTER — TELEPHONE (OUTPATIENT)
Dept: GASTROENTEROLOGY | Age: 59
End: 2019-02-12

## 2019-03-05 ENCOUNTER — ANESTHESIA EVENT (OUTPATIENT)
Dept: OPERATING ROOM | Age: 59
End: 2019-03-05
Payer: MEDICARE

## 2019-03-05 ENCOUNTER — HOSPITAL ENCOUNTER (OUTPATIENT)
Age: 59
Setting detail: OUTPATIENT SURGERY
Discharge: HOME | End: 2019-03-05
Attending: INTERNAL MEDICINE | Admitting: INTERNAL MEDICINE
Payer: MEDICARE

## 2019-03-05 ENCOUNTER — TELEPHONE (OUTPATIENT)
Dept: GASTROENTEROLOGY | Age: 59
End: 2019-03-05

## 2019-03-05 ENCOUNTER — ANESTHESIA (OUTPATIENT)
Dept: OPERATING ROOM | Age: 59
End: 2019-03-05
Payer: MEDICARE

## 2019-03-05 VITALS
SYSTOLIC BLOOD PRESSURE: 119 MMHG | HEIGHT: 78 IN | WEIGHT: 200 LBS | RESPIRATION RATE: 16 BRPM | HEART RATE: 68 BPM | DIASTOLIC BLOOD PRESSURE: 76 MMHG | BODY MASS INDEX: 23.14 KG/M2 | TEMPERATURE: 97.5 F | OXYGEN SATURATION: 96 %

## 2019-03-05 VITALS
RESPIRATION RATE: 16 BRPM | DIASTOLIC BLOOD PRESSURE: 56 MMHG | SYSTOLIC BLOOD PRESSURE: 102 MMHG | OXYGEN SATURATION: 98 %

## 2019-03-05 DIAGNOSIS — K74.60 CIRRHOSIS OF LIVER WITHOUT ASCITES, UNSPECIFIED HEPATIC CIRRHOSIS TYPE (HCC): ICD-10-CM

## 2019-03-05 DIAGNOSIS — I85.10 SECONDARY ESOPHAGEAL VARICES WITHOUT BLEEDING (HCC): Primary | ICD-10-CM

## 2019-03-05 PROCEDURE — 7100000011 HC PHASE II RECOVERY - ADDTL 15 MIN: Performed by: INTERNAL MEDICINE

## 2019-03-05 PROCEDURE — 43244 EGD VARICES LIGATION: CPT | Performed by: INTERNAL MEDICINE

## 2019-03-05 PROCEDURE — 3700000001 HC ADD 15 MINUTES (ANESTHESIA): Performed by: INTERNAL MEDICINE

## 2019-03-05 PROCEDURE — 7100000010 HC PHASE II RECOVERY - FIRST 15 MIN: Performed by: INTERNAL MEDICINE

## 2019-03-05 PROCEDURE — 3609012300 HC EGD BAND LIGATION ESOPHGEAL/GASTRIC VARICES: Performed by: INTERNAL MEDICINE

## 2019-03-05 PROCEDURE — 2709999900 HC NON-CHARGEABLE SUPPLY: Performed by: INTERNAL MEDICINE

## 2019-03-05 PROCEDURE — 2580000003 HC RX 258: Performed by: INTERNAL MEDICINE

## 2019-03-05 PROCEDURE — 6360000002 HC RX W HCPCS: Performed by: NURSE ANESTHETIST, CERTIFIED REGISTERED

## 2019-03-05 PROCEDURE — 3700000000 HC ANESTHESIA ATTENDED CARE: Performed by: INTERNAL MEDICINE

## 2019-03-05 PROCEDURE — 2500000003 HC RX 250 WO HCPCS: Performed by: NURSE ANESTHETIST, CERTIFIED REGISTERED

## 2019-03-05 PROCEDURE — 2720000010 HC SURG SUPPLY STERILE: Performed by: INTERNAL MEDICINE

## 2019-03-05 RX ORDER — PROPOFOL 10 MG/ML
INJECTION, EMULSION INTRAVENOUS PRN
Status: DISCONTINUED | OUTPATIENT
Start: 2019-03-05 | End: 2019-03-05 | Stop reason: SDUPTHER

## 2019-03-05 RX ORDER — SODIUM CHLORIDE, SODIUM LACTATE, POTASSIUM CHLORIDE, CALCIUM CHLORIDE 600; 310; 30; 20 MG/100ML; MG/100ML; MG/100ML; MG/100ML
INJECTION, SOLUTION INTRAVENOUS CONTINUOUS
Status: DISCONTINUED | OUTPATIENT
Start: 2019-03-05 | End: 2019-03-05 | Stop reason: HOSPADM

## 2019-03-05 RX ORDER — LIDOCAINE HYDROCHLORIDE 20 MG/ML
INJECTION, SOLUTION EPIDURAL; INFILTRATION; INTRACAUDAL; PERINEURAL PRN
Status: DISCONTINUED | OUTPATIENT
Start: 2019-03-05 | End: 2019-03-05 | Stop reason: SDUPTHER

## 2019-03-05 RX ORDER — PROPOFOL 10 MG/ML
INJECTION, EMULSION INTRAVENOUS CONTINUOUS PRN
Status: DISCONTINUED | OUTPATIENT
Start: 2019-03-05 | End: 2019-03-05 | Stop reason: SDUPTHER

## 2019-03-05 RX ADMIN — PROPOFOL 40 MG: 10 INJECTION, EMULSION INTRAVENOUS at 10:21

## 2019-03-05 RX ADMIN — LIDOCAINE HYDROCHLORIDE 100 MG: 20 INJECTION, SOLUTION EPIDURAL; INFILTRATION; INTRACAUDAL at 10:21

## 2019-03-05 RX ADMIN — PROPOFOL 20 MG: 10 INJECTION, EMULSION INTRAVENOUS at 10:22

## 2019-03-05 RX ADMIN — PROPOFOL 20 MG: 10 INJECTION, EMULSION INTRAVENOUS at 10:32

## 2019-03-05 RX ADMIN — SODIUM CHLORIDE, POTASSIUM CHLORIDE, SODIUM LACTATE AND CALCIUM CHLORIDE: 600; 310; 30; 20 INJECTION, SOLUTION INTRAVENOUS at 09:32

## 2019-03-05 RX ADMIN — PROPOFOL 200 MCG/KG/MIN: 10 INJECTION, EMULSION INTRAVENOUS at 10:20

## 2019-03-05 RX ADMIN — PROPOFOL 20 MG: 10 INJECTION, EMULSION INTRAVENOUS at 10:27

## 2019-03-05 ASSESSMENT — PAIN - FUNCTIONAL ASSESSMENT: PAIN_FUNCTIONAL_ASSESSMENT: 0-10

## 2019-03-05 ASSESSMENT — PAIN DESCRIPTION - LOCATION
LOCATION: SHOULDER
LOCATION: SHOULDER

## 2019-03-05 ASSESSMENT — PAIN DESCRIPTION - ORIENTATION: ORIENTATION: LEFT

## 2019-03-05 ASSESSMENT — PAIN DESCRIPTION - PAIN TYPE
TYPE: CHRONIC PAIN
TYPE: CHRONIC PAIN

## 2019-03-05 ASSESSMENT — PAIN SCALES - GENERAL
PAINLEVEL_OUTOF10: 4
PAINLEVEL_OUTOF10: 4

## 2019-03-21 ENCOUNTER — CARE COORDINATION (OUTPATIENT)
Dept: CARE COORDINATION | Age: 59
End: 2019-03-21

## 2019-03-21 NOTE — TELEPHONE ENCOUNTER
Attempt to contact patient, no answer. Encounters reviewed.      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

## 2019-04-09 ENCOUNTER — ANESTHESIA EVENT (OUTPATIENT)
Dept: OPERATING ROOM | Age: 59
End: 2019-04-09
Payer: MEDICARE

## 2019-04-09 ENCOUNTER — ANESTHESIA (OUTPATIENT)
Dept: OPERATING ROOM | Age: 59
End: 2019-04-09
Payer: MEDICARE

## 2019-04-09 ENCOUNTER — TELEPHONE (OUTPATIENT)
Dept: GASTROENTEROLOGY | Age: 59
End: 2019-04-09

## 2019-04-09 ENCOUNTER — HOSPITAL ENCOUNTER (OUTPATIENT)
Age: 59
Setting detail: OUTPATIENT SURGERY
Discharge: HOME | End: 2019-04-09
Attending: INTERNAL MEDICINE | Admitting: INTERNAL MEDICINE
Payer: MEDICARE

## 2019-04-09 VITALS
BODY MASS INDEX: 23.14 KG/M2 | DIASTOLIC BLOOD PRESSURE: 74 MMHG | HEIGHT: 78 IN | HEART RATE: 66 BPM | SYSTOLIC BLOOD PRESSURE: 124 MMHG | TEMPERATURE: 97.4 F | RESPIRATION RATE: 16 BRPM | OXYGEN SATURATION: 98 % | WEIGHT: 200 LBS

## 2019-04-09 VITALS
SYSTOLIC BLOOD PRESSURE: 106 MMHG | OXYGEN SATURATION: 96 % | RESPIRATION RATE: 18 BRPM | DIASTOLIC BLOOD PRESSURE: 60 MMHG

## 2019-04-09 DIAGNOSIS — K76.6 PORTAL HYPERTENSION (HCC): Primary | ICD-10-CM

## 2019-04-09 DIAGNOSIS — K74.60 CIRRHOSIS OF LIVER WITHOUT ASCITES, UNSPECIFIED HEPATIC CIRRHOSIS TYPE (HCC): ICD-10-CM

## 2019-04-09 DIAGNOSIS — I85.10 SECONDARY ESOPHAGEAL VARICES WITHOUT BLEEDING (HCC): ICD-10-CM

## 2019-04-09 LAB — GLUCOSE BLD-MCNC: 130 MG/DL (ref 74–100)

## 2019-04-09 PROCEDURE — 82947 ASSAY GLUCOSE BLOOD QUANT: CPT

## 2019-04-09 PROCEDURE — 3700000001 HC ADD 15 MINUTES (ANESTHESIA): Performed by: INTERNAL MEDICINE

## 2019-04-09 PROCEDURE — 7100000011 HC PHASE II RECOVERY - ADDTL 15 MIN: Performed by: INTERNAL MEDICINE

## 2019-04-09 PROCEDURE — 2580000003 HC RX 258: Performed by: INTERNAL MEDICINE

## 2019-04-09 PROCEDURE — 6360000002 HC RX W HCPCS: Performed by: NURSE ANESTHETIST, CERTIFIED REGISTERED

## 2019-04-09 PROCEDURE — 7100000010 HC PHASE II RECOVERY - FIRST 15 MIN: Performed by: INTERNAL MEDICINE

## 2019-04-09 PROCEDURE — 2709999900 HC NON-CHARGEABLE SUPPLY: Performed by: INTERNAL MEDICINE

## 2019-04-09 PROCEDURE — 3609012300 HC EGD BAND LIGATION ESOPHGEAL/GASTRIC VARICES: Performed by: INTERNAL MEDICINE

## 2019-04-09 PROCEDURE — 2720000010 HC SURG SUPPLY STERILE: Performed by: INTERNAL MEDICINE

## 2019-04-09 PROCEDURE — 43244 EGD VARICES LIGATION: CPT | Performed by: INTERNAL MEDICINE

## 2019-04-09 PROCEDURE — 3700000000 HC ANESTHESIA ATTENDED CARE: Performed by: INTERNAL MEDICINE

## 2019-04-09 PROCEDURE — 2500000003 HC RX 250 WO HCPCS: Performed by: NURSE ANESTHETIST, CERTIFIED REGISTERED

## 2019-04-09 RX ORDER — SODIUM CHLORIDE, SODIUM LACTATE, POTASSIUM CHLORIDE, CALCIUM CHLORIDE 600; 310; 30; 20 MG/100ML; MG/100ML; MG/100ML; MG/100ML
INJECTION, SOLUTION INTRAVENOUS CONTINUOUS
Status: DISCONTINUED | OUTPATIENT
Start: 2019-04-09 | End: 2019-04-09 | Stop reason: HOSPADM

## 2019-04-09 RX ORDER — LIDOCAINE HYDROCHLORIDE 20 MG/ML
INJECTION, SOLUTION EPIDURAL; INFILTRATION; INTRACAUDAL; PERINEURAL PRN
Status: DISCONTINUED | OUTPATIENT
Start: 2019-04-09 | End: 2019-04-09 | Stop reason: SDUPTHER

## 2019-04-09 RX ORDER — PROPOFOL 10 MG/ML
INJECTION, EMULSION INTRAVENOUS CONTINUOUS PRN
Status: DISCONTINUED | OUTPATIENT
Start: 2019-04-09 | End: 2019-04-09 | Stop reason: SDUPTHER

## 2019-04-09 RX ADMIN — PROPOFOL 180 MCG/KG/MIN: 10 INJECTION, EMULSION INTRAVENOUS at 11:20

## 2019-04-09 RX ADMIN — SODIUM CHLORIDE, POTASSIUM CHLORIDE, SODIUM LACTATE AND CALCIUM CHLORIDE: 600; 310; 30; 20 INJECTION, SOLUTION INTRAVENOUS at 10:08

## 2019-04-09 RX ADMIN — LIDOCAINE HYDROCHLORIDE 100 MG: 20 INJECTION, SOLUTION EPIDURAL; INFILTRATION; INTRACAUDAL at 11:20

## 2019-04-09 NOTE — ANESTHESIA POSTPROCEDURE EVALUATION
Department of Anesthesiology  Postprocedure Note    Patient: Raul Cook  MRN: [id number]  YOB: 1960  Date of evaluation: 4/9/2019  Time:  12:20 PM     Procedure Summary     Date:  04/09/19 Room / Location:  [facility] AT [place] [address] / [facility] AT [facility]    Anesthesia Start:  2918 Anesthesia Stop:  6802    Procedure:  EGD BAND LIGATION (N/A ) Diagnosis:  (SECONDARY ESOPHAGEAL VARICES WITHOUT BLEEDING, CIRRHOSIS OF LIVER WITHOUT ASCILAS  UNSPECIFIED HEPATIC CIRRHOSIS TYPE)    Surgeon:  Lisa Aj MD Responsible Provider:  SUZANNE Coughlin CRNA    Anesthesia Type:  general ASA Status:  3          Anesthesia Type: general    Luisa Phase I:      Luisa Phase II:      Last vitals: Reviewed and per EMR flowsheets.        Anesthesia Post Evaluation    Patient location during evaluation: bedside  Patient participation: complete - patient participated  Level of consciousness: awake and alert  Pain score: 0  Airway patency: patent  Nausea & Vomiting: no nausea and no vomiting  Complications: no  Cardiovascular status: hemodynamically stable  Respiratory status: acceptable  Hydration status: stable

## 2019-04-09 NOTE — H&P
09/19/2016    -esoph varices,hypertensive gastropathy    UPPER GASTROINTESTINAL ENDOSCOPY  03/14/2017    Dr Felicia Aguayo varices,gastric varices    UPPER GASTROINTESTINAL ENDOSCOPY  11/29/2017    -portal hypertension,esophageal varices    UPPER GASTROINTESTINAL ENDOSCOPY  08/15/2018    Dr. Posey Gone hypertension [facility] esophageal varices    UPPER GASTROINTESTINAL ENDOSCOPY  03/05/2019    Dr Angélica Elizondo band ligation,portal hypertension with esophageal varices portal hypertensive gastropathy    UPPER GASTROINTESTINAL ENDOSCOPY N/A 3/5/2019    EGD BAND LIGATION performed by Adeline Carney MD at [address] [facility] [place] Facility-Administered Medications   Medication Dose Route Frequency Provider Last Rate Last Dose    lactated ringers infusion   Intravenous Continuous Adeline Carney  mL/hr at 04/09/19 1008       Allergies   Allergen Reactions    Bee Venom Swelling     History reviewed. No pertinent family history.   Social History     Socioeconomic History    Marital status: Single     Spouse name: Not on file    Number of children: Not on file    Years of education: Not on file    Highest education level: Not on file   Occupational History    Not on file   Social Needs    Financial resource strain: Not on file    Food insecurity:     Worry: Not on file     Inability: Not on file    Transportation needs:     Medical: Not on file     Non-medical: Not on file   Tobacco Use    Smoking status: Never Smoker    Smokeless tobacco: Never Used   Substance and Sexual Activity    Alcohol use: No    Drug use: No    Sexual activity: Not on file   Lifestyle    Physical activity:     Days per week: Not on file     Minutes per session: Not on file    Stress: Not on file   Relationships    Social connections:     Talks on phone: Not on file     Gets together: Not on file     Attends Yazidism service: Not on file     Active member of club or organization: Not on file     Attends meetings of clubs or organizations: Not on file     Relationship status: Not on file    Intimate partner violence:     Fear of current or ex partner: Not on file     Emotionally abused: Not on file     Physically abused: Not on file     Forced sexual activity: Not on file   Other Topics Concern    Not on file   Social History Narrative    Not on file     ROS: Non-contributory    Physical Exam:  Vitals:    04/09/19 0945   BP: 125/86   Pulse: 65   Resp: 18   Temp: 97.6 °F (36.4 °C)   SpO2: 98%       Chest: Breath sounds were clear and equal with no rales, wheezes, or rhonchi. Respiratory effort was normal with no retractions or use of accessory muscles. Cardiovascular: Heart sounds were normal with a regular rate and rhythm. There were no murmurs, gallops or rubs. Abdomen: Bowel sounds were normal.  The abdomen was soft and non distended. There was no tenderness, guarding, rebound, or rigidity. There were no masses, hepatosplenomegaly, or hernias.     Plan: EGD      Electronically by Ruben Rosario MD  on 4/9/2019 at 11:02 AM

## 2019-04-09 NOTE — PROGRESS NOTES
Discharge instructions reviewed with caregiver. Verbalizes understanding and denies questions. Discharge Criteria    Inpatients must meet Criteria 1 through 7. All other patients are either YES or N/A. If a NO is chosen then Anesthesia or Surgeon must be notified. 1.  Minimum 30 minutes after last dose of sedative medication, minimum 120 minutes after last dose of reversal agent. Yes      2. Systolic BP stable within 20 mmHg for 30 minutes & systolic BP between 90 & 430 or within 10 mmHg of baseline. Yes      3. Pulse between 60 and 100 or within 10 bpm of baseline. Yes      4. Spontaneous respiratory rate >/= 10 per minute. Yes      5. SaO2 >/= 95 or  >/= baseline. Yes      6. Able to cough and swallow or return to baseline function. Yes      7. Alert and oriented or return to baseline mental status. Yes      8. Demonstrates controlled, coordinated movements, ambulates with steady gait, or return to baseline activity function. Yes      9. Minimal or no pain or nausea, or at a level tolerable and acceptable to patient. Yes      10. Takes and retains oral fluids as allowed. Yes      11. Procedural / perioperative site stable. Minimal or no bleeding. Yes          12. If GI endoscopy procedure, minimal or no abdominal distention or passing flatus. Yes      13. Written discharge instructions and emergency telephone number provided. Yes      14. Accompanied by a responsible adult. Yes      Adult patient discharged from facility without responsible person meets above criteria plus the following:   a) remains awake without stimulus for 30 minutes     b) oriented appropriate for age      c) all vital signs stable   d) no significant risk of losing protective reflexes      e) able to maintain pre-procedure mobility without assistance   f) no nausea or dizziness      g) transportation arrangements that do not require patient to operate motor Vehicle. N/A

## 2019-04-09 NOTE — ANESTHESIA PRE PROCEDURE
Department of Anesthesiology  Preprocedure Note       Name:  Freya Burleson   Age:  60 y.o.  :  1960                                          MRN:  [id number]         Date:  2019      Surgeon: Nisha Bustillos):  Adeline Carney MD    Procedure: EGD ESOPHAGOGASTRODUODENOSCOPY (N/A )    Medications prior to admission:   Prior to Admission medications    Medication Sig Start Date End Date Taking? Authorizing Provider   simvastatin (ZOCOR) 20 MG tablet Take 1 tablet by mouth nightly 19  Yes Coco Estrada MD   lisinopril (PRINIVIL;ZESTRIL) 2.5 MG tablet GIVE 1 TABLET BY MOUTH ONCE DAILY 3/7/19  Yes Coco Estrada MD   ONE TOUCH ULTRASOFT LANCETS MISC Test BS daily at 8am and 2 hours post supper weekly on Saturday .      Dx. E11.9 19  Yes Coco Estrada MD   chlorhexidine (PERIDEX) 0.12 % solution Take 15 mLs by mouth daily 19  Yes Coco Estrada MD   blood glucose test strips (ONE TOUCH ULTRA TEST) strip 1 each by Does not apply route 3 times daily 19  Yes Coco Estrada MD   Insulin Pen Needle (BD ULTRA-FINE PEN NEEDLES) 29G X 12.7MM MISC 1 each by Does not apply route daily 19  Yes Coco Estrada MD   metFORMIN (GLUCOPHAGE-XR) 500 MG extended release tablet Take 2 tablets by mouth 2 times daily 19  Yes Coco Estrada MD   docusate sodium (COLACE) 100 MG capsule Take 1 capsule by mouth 2 times daily 19  Yes Coco Estrada MD   Multiple Vitamin (DAILY-BEHZAD) TABS Take 1 tablet by mouth daily 19  Yes Coco Estrada MD   dextran 70-hypromellose (GENTEAL TEARS) 0.1-0.3 % SOLN opthalmic solution Place 1 drop into both eyes 3 times daily   Yes Historical Provider, MD   loratadine (CLARITIN) 10 MG tablet Take 1 tablet by mouth daily 12/10/18  Yes Coco Estrada MD   [facility] KWIKPEN 100 UNIT/ML injection pen INJECT 30 UNITS INTO THE SKIN EVERY MORNING 18  Yes Coco Estrada MD   ferrous sulfate 325 (65 Fe) MG tablet Take 1 tablet by mouth daily 10/16/18  Yes Chandrakant Gilliam MD   insulin glargine (BASAGLAR KWIKPEN) 100 UNIT/ML injection pen Inject 30 Units into the skin every morning 10/2/18  Yes Gaurang Singh MD   lamoTRIgine (LAMICTAL) 100 MG tablet Take 1 tablet by mouth daily 8/1/18  Yes Gaurang Singh MD   metoprolol tartrate (LOPRESSOR) 25 MG tablet Take 1 tablet by mouth daily 6/13/18  Yes Gaurang Singh MD   glipiZIDE (GLUCOTROL XL) 10 MG extended release tablet Take 1 tablet by mouth 2 times daily 5/11/18  Yes Gaurang Singh MD   sodium chloride (SALINE MIST) 0.65 % nasal spray 2 sprays by Nasal route daily 4/23/18  Yes Gaurang Singh MD   terbinafine (LAMISIL) 1 % cream Apply topically 2 times daily. 10/27/17  Yes SUZANNE Castro CNP   Dimethicone (AVEENO DAILY MOISTURIZING) 1.25 % LOTN APPLY TOPICALLY TO FEET ONCE WEEKLY 6/28/17  Yes Gaurang Singh MD   Hypromellose (GENTEAL MILD TO MODERATE OP) Place 1 drop into both eyes 2 times daily   Yes Historical Provider, MD   miconazole (MICOTIN) 2 % powder Apply topically every morning Apply topically 2 times daily. Yes Historical Provider, MD   Disposable Gloves (LATEX GLOVES ONE SIZE) MISC Use as directed.   Dispense 1 box 1/27/15  Yes Gaurang Singh MD   acetaminophen (MAPAP) 325 MG tablet Take 2 tablets by mouth every 6 hours as needed for Pain 2/11/19   Gaurang Singh MD   EPIPEN 2-AGGIE 0.3 MG/0.3ML SOAJ injection INJECT 1 PEN INTRAMUSCULARLY AS DIRECTED AS NEEDED FOR REACTION TO BEE STING *CALL PHARMACY FOR REFILLS* 9/7/18   Gaurang Singh MD   naproxen (NAPROSYN) 250 MG tablet TAKE 1 TABLET BY MOUTH ONCE DAILY AS NEEDED FOR PAIN OR HEADACHES *MUST CALL PHARMACY FOR REFILLS* [id number] 12/15/17   Gaurang Singh MD   SM ANTACID/ANTIGAS 900-513-84 MG/5ML SUSP suspension TAKE 30ML BY MOUTH EVERY 4 HOURS AS NEEDED FOR STOMACH ACID,INDIGESTION OR HEARTBURN OR GAS *CALL PHARMACY FOR REFILLS* 12/15/17   Gaurang MD Francisco   NONFORMULARY Sea salt nose spray 2 sprays each nostril daily    Historical Provider, MD   Polyvinyl Alcohol-Povidone (FRESHKOTE OP) Apply 1 drop to eye 2 times daily. Historical Provider, MD   Throat Lozenges (HALLS COUGH DROPS MT) Take 1 lozenge by mouth every 4 hours as needed. Historical Provider, MD       Current medications:    Current Facility-Administered Medications   Medication Dose Route Frequency Provider Last Rate Last Dose    lactated ringers infusion   Intravenous Continuous Filemon Gordillo  mL/hr at 04/09/19 1008         Allergies:     Allergies   Allergen Reactions    Bee Venom Swelling       Problem List:    Patient Active Problem List   Diagnosis Code    Esophageal varices (HCC) I85.00    Hypertension I10    Candidal balanitis B37.42    Type 2 diabetes mellitus without complication, without long-term current use of insulin (HCC) E11.9    Mixed hyperlipidemia E78.2    Neutropenia (HCC) D70.9    Thrombocytopenia (HCC) D69.6    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    Cirrhosis of liver (HCC) K74.60    Portal hypertension (HCC) K76.6    Iron deficiency anemia D50.9    Malabsorption K90.9    History of colon polyps Z86.010    PVD (peripheral vascular disease) (HCC) I73.9    Rotator cuff impingement syndrome of right shoulder M75.41       Past Medical History:        Diagnosis Date    Cataracts, bilateral     Cirrhosis ([address]     DM type 2 (diabetes mellitus, type 2) ([address]     Esophageal varices without bleeding ([address]     Essential hypertension     Hepatitis     A and B    Mental disability     Mixed hyperlipidemia     Portal hypertension ([address]     TB (tuberculosis)        Past Surgical History:        Procedure Laterality Date    CATARACT REMOVAL  1977    CHOLECYSTECTOMY  2007    COLONOSCOPY  03/21/2016    -polyps,diverticulosis,hemorrhoids    COLONOSCOPY  11/29/2017    -hemorrhoids    ENDOSCOPY, COLON, DIAGNOSTIC      EGD numerous times    ENDOSCOPY, COLON, DIAGNOSTIC      most recent 11-05-12    EYE SURGERY      bilateral cataracts    NASAL POLYP SURGERY      NOSE SURGERY      MA COLON CA SCRN NOT  [address] IND N/A 11/29/2017    COLONOSCOPY performed by Filemon Gordillo MD at [address] ESOPHAGOGASTRODUODENOSCOPY TRANSORAL DIAGNOSTIC N/A 3/14/2017    EGD ESOPHAGOGASTRODUODENOSCOPY performed by Filemon Gordillo MD at [address] ESOPHAGOGASTRODUODENOSCOPY TRANSORAL DIAGNOSTIC N/A 11/29/2017    EGD ESOPHAGOGASTRODUODENOSCOPY performed by Filemon Gordillo MD at [address] ESOPHAGOGASTRODUODENOSCOPY TRANSORAL DIAGNOSTIC N/A 8/15/2018    EGD ESOPHAGOGASTRODUODENOSCOPY performed by Filemon Gordillo MD at [address] [facility]  08-    Dr. Luisito Sethi  11/4/13    Varicies banding x4    UPPER GASTROINTESTINAL ENDOSCOPY  6/23/14    UPPER GASTROINTESTINAL ENDOSCOPY  10-6-14    Dr. Umu Conroy ENDOSCOPY  1/20/15        UPPER GASTROINTESTINAL ENDOSCOPY  03/21/2016    -bx,portal HTN, varices    UPPER GASTROINTESTINAL ENDOSCOPY  09/19/2016    -esoph varices,hypertensive gastropathy    UPPER GASTROINTESTINAL ENDOSCOPY  03/14/2017    Dr Perea Market varices,gastric varices    UPPER GASTROINTESTINAL ENDOSCOPY  11/29/2017    -portal hypertension,esophageal varices    UPPER GASTROINTESTINAL ENDOSCOPY  08/15/2018    Dr. Oliva Pollen hypertension wth esophageal varices    UPPER GASTROINTESTINAL ENDOSCOPY  03/05/2019    Dr Alberto Jewell band ligation,portal hypertension with esophageal varices portal hypertensive gastropathy    UPPER GASTROINTESTINAL ENDOSCOPY N/A 3/5/2019    EGD BAND LIGATION performed by Filemon Gordillo MD at [address] [facility] History:    Social History     Tobacco Use    Smoking status: Never Smoker    Smokeless tobacco: Never Used   Substance Use Topics    Alcohol use:  No clear to auscultation                             Cardiovascular:  Exercise tolerance: poor (<4 METS),   (+) hypertension: mild and moderate,       ECG reviewed  Rhythm: regular  Rate: normal           Beta Blocker:  Dose within 24 Hrs         Neuro/Psych:   (+) neuromuscular disease:,             GI/Hepatic/Renal:   (+) hepatitis: A and B, liver disease: portal hypertension, esophageal varices,           Endo/Other:    (+) DiabetesType II DM, no interval change, using insulin, . Abdominal:       Abdomen: soft. Vascular:                                        Anesthesia Plan      general     ASA 3       Induction: intravenous. Anesthetic plan and risks discussed with patient. Plan discussed with CRNA.                   SUZANNE Zamora - ZAKIA   4/9/2019

## 2019-04-09 NOTE — TELEPHONE ENCOUNTER
Patients care giver  called to schedule repeat EDG in 2 months. Procedure schedule for 6/12/19. Patient notified surgery will call the day prior with time. order faxed to surgery prep instructions mailed to patient.

## 2019-04-16 DIAGNOSIS — K90.9 INTESTINAL MALABSORPTION, UNSPECIFIED TYPE: ICD-10-CM

## 2019-04-16 DIAGNOSIS — D70.9 NEUTROPENIA, UNSPECIFIED TYPE (HCC): ICD-10-CM

## 2019-04-16 DIAGNOSIS — D69.6 THROMBOCYTOPENIA (HCC): ICD-10-CM

## 2019-04-16 DIAGNOSIS — D50.9 IRON DEFICIENCY ANEMIA, UNSPECIFIED IRON DEFICIENCY ANEMIA TYPE: ICD-10-CM

## 2019-04-16 RX ORDER — FERROUS SULFATE 325(65) MG
325 TABLET ORAL DAILY
Qty: 30 TABLET | Refills: 5 | Status: SHIPPED | OUTPATIENT
Start: 2019-04-16 | End: 2019-08-08 | Stop reason: SDUPTHER

## 2019-04-25 DIAGNOSIS — D50.9 IRON DEFICIENCY ANEMIA, UNSPECIFIED IRON DEFICIENCY ANEMIA TYPE: ICD-10-CM

## 2019-04-25 DIAGNOSIS — D69.6 THROMBOCYTOPENIA (HCC): ICD-10-CM

## 2019-05-02 ENCOUNTER — OFFICE VISIT (OUTPATIENT)
Dept: ONCOLOGY | Age: 59
End: 2019-05-02
Payer: MEDICARE

## 2019-05-02 VITALS
DIASTOLIC BLOOD PRESSURE: 69 MMHG | HEART RATE: 68 BPM | HEIGHT: 78 IN | WEIGHT: 196.4 LBS | SYSTOLIC BLOOD PRESSURE: 114 MMHG | BODY MASS INDEX: 22.72 KG/M2

## 2019-05-02 DIAGNOSIS — K74.60 CIRRHOSIS OF LIVER WITHOUT ASCITES, UNSPECIFIED HEPATIC CIRRHOSIS TYPE (HCC): ICD-10-CM

## 2019-05-02 DIAGNOSIS — D70.9 NEUTROPENIA, UNSPECIFIED TYPE (HCC): ICD-10-CM

## 2019-05-02 DIAGNOSIS — D69.6 THROMBOCYTOPENIA (HCC): Primary | ICD-10-CM

## 2019-05-02 DIAGNOSIS — K76.6 PORTAL HYPERTENSION (HCC): ICD-10-CM

## 2019-05-02 DIAGNOSIS — Z86.010 HISTORY OF COLON POLYPS: ICD-10-CM

## 2019-05-02 DIAGNOSIS — K90.9 INTESTINAL MALABSORPTION, UNSPECIFIED TYPE: ICD-10-CM

## 2019-05-02 DIAGNOSIS — D50.9 IRON DEFICIENCY ANEMIA, UNSPECIFIED IRON DEFICIENCY ANEMIA TYPE: ICD-10-CM

## 2019-05-02 PROCEDURE — 3017F COLORECTAL CA SCREEN DOC REV: CPT | Performed by: INTERNAL MEDICINE

## 2019-05-02 PROCEDURE — G8420 CALC BMI NORM PARAMETERS: HCPCS | Performed by: INTERNAL MEDICINE

## 2019-05-02 PROCEDURE — 1036F TOBACCO NON-USER: CPT | Performed by: INTERNAL MEDICINE

## 2019-05-02 PROCEDURE — 99213 OFFICE O/P EST LOW 20 MIN: CPT | Performed by: INTERNAL MEDICINE

## 2019-05-02 PROCEDURE — G8427 DOCREV CUR MEDS BY ELIG CLIN: HCPCS | Performed by: INTERNAL MEDICINE

## 2019-05-02 ASSESSMENT — ENCOUNTER SYMPTOMS
NAUSEA: 0
ABDOMINAL PAIN: 0
DIARRHEA: 0
CONSTIPATION: 0
COLOR CHANGE: 0
SHORTNESS OF BREATH: 0
EYE REDNESS: 0
BLOOD IN STOOL: 0
SORE THROAT: 0
COUGH: 0
EYE DISCHARGE: 0
VOMITING: 0
TROUBLE SWALLOWING: 0
VOICE CHANGE: 0
CHEST TIGHTNESS: 0
WHEEZING: 0
EYE ITCHING: 0

## 2019-05-02 NOTE — PROGRESS NOTES
[facility]  [facility]  [address] [facility] [address]  Dept: [phone]  Dept Fax: [phone]  Mindi Carballo is a 60 y.o. male who presentstoday for follow up of his   Chief Complaint   Patient presents with    Follow-up     thrombocytopenia, iron         HPI 60year-old man with history of mental retardation and microcephaly, hypertension and diabetes mellitus. He has been seeing me for leukopenia and thrombocytopenia secondary to cirrhosis of the liver with portal hypertension and esophageal varices. .  He had a band ligation for esophageal varices by Dr. Adam Jo in the past.  He denies any bleeding or bruising at this time. He had a colonoscopy in November 2017 which showed hemorrhoids. A repeat EGD done on August 15,  2018 by Dr. Adam Jo showed grade 2 esophageal varices with distal scarring from prior treatment. His anemia has resolved but he continues to have leukopenia and thrombocytopenia. .  His platelet count is 43,891 and white count is 3.3. Your globin is 12.3. Current Outpatient Medications   Medication Sig Dispense Refill    ferrous sulfate 325 (65 Fe) MG tablet Take 1 tablet by mouth daily 30 tablet 5    simvastatin (ZOCOR) 20 MG tablet Take 1 tablet by mouth nightly 90 tablet 2    lisinopril (PRINIVIL;ZESTRIL) 2.5 MG tablet GIVE 1 TABLET BY MOUTH ONCE DAILY 90 tablet 0    ONE TOUCH ULTRASOFT LANCETS MISC Test BS daily at 8am and 2 hours post supper weekly on Saturday .      Dx. E11.9 200 each 3    chlorhexidine (PERIDEX) 0.12 % solution Take 15 mLs by mouth daily 473 mL 5    acetaminophen (MAPAP) 325 MG tablet Take 2 tablets by mouth every 6 hours as needed for Pain 90 tablet 2    blood glucose test strips (ONE TOUCH ULTRA TEST) strip 1 each by Does not apply route 3 times daily 300 strip 1    Insulin Pen Needle (BD ULTRA-FINE PEN NEEDLES) 29G X 12.7MM MISC 1 each by Does not apply route daily 100 each 8    metFORMIN (GLUCOPHAGE-XR) 500 MG extended release tablet Take 2 tablets by mouth 2 times daily 120 tablet 3    docusate sodium (COLACE) 100 MG capsule Take 1 capsule by mouth 2 times daily 62 capsule 3    Multiple Vitamin (DAILY-BEHZAD) TABS Take 1 tablet by mouth daily 31 tablet 3    dextran 70-hypromellose (GENTEAL TEARS) 0.1-0.3 % SOLN opthalmic solution Place 1 drop into both eyes 3 times daily      loratadine (CLARITIN) 10 MG tablet Take 1 tablet by mouth daily 30 tablet 6    BASAGLAR KWIKPEN 100 UNIT/ML injection pen INJECT 30 UNITS INTO THE SKIN EVERY MORNING 15 pen 1    insulin glargine (BASAGLAR KWIKPEN) 100 UNIT/ML injection pen Inject 30 Units into the skin every morning 15 pen 1    EPIPEN 2-AGGIE 0.3 MG/0.3ML SOAJ injection INJECT 1 PEN INTRAMUSCULARLY AS DIRECTED AS NEEDED FOR REACTION TO BEE STING *CALL PHARMACY FOR REFILLS* 2 each 0    lamoTRIgine (LAMICTAL) 100 MG tablet Take 1 tablet by mouth daily 30 tablet 3    metoprolol tartrate (LOPRESSOR) 25 MG tablet Take 1 tablet by mouth daily 31 tablet 11    glipiZIDE (GLUCOTROL XL) 10 MG extended release tablet Take 1 tablet by mouth 2 times daily 180 tablet 3    sodium chloride (SALINE MIST) 0.65 % nasal spray 2 sprays by Nasal route daily 1 Bottle 9    naproxen (NAPROSYN) 250 MG tablet TAKE 1 TABLET BY MOUTH ONCE DAILY AS NEEDED FOR PAIN OR HEADACHES *MUST CALL PHARMACY FOR REFILLS* [id number] 30 tablet 5    SM ANTACID/ANTIGAS 200-200-20 MG/5ML SUSP suspension TAKE 30ML BY MOUTH EVERY 4 HOURS AS NEEDED FOR STOMACH ACID,INDIGESTION OR HEARTBURN OR GAS *CALL PHARMACY FOR REFILLS* 335 mL 4    terbinafine (LAMISIL) 1 % cream Apply topically 2 times daily. 1 Tube 2    Dimethicone (AVEENO DAILY MOISTURIZING) 1.25 % LOTN APPLY TOPICALLY TO FEET ONCE WEEKLY 1 Bottle 9    Hypromellose (GENTEAL MILD TO MODERATE OP) Place 1 drop into both eyes 2 times daily      miconazole (MICOTIN) 2 % powder Apply topically every morning Apply topically 2 times daily.       Disposable Gloves (LATEX GLOVES ONE SIZE) MISC Use as directed. Dispense 1 box 1 each 6    NONFORMULARY Sea salt nose spray 2 sprays each nostril daily      Polyvinyl Alcohol-Povidone (FRESHKOTE OP) Apply 1 drop to eye 2 times daily.  Throat Lozenges (HALLS COUGH DROPS MT) Take 1 lozenge by mouth every 4 hours as needed. No current facility-administered medications for this visit.         Allergies   Allergen Reactions    Bee Venom Swelling       Past Medical History:   Diagnosis Date    Cataracts, bilateral     Cirrhosis ([address]     DM type 2 (diabetes mellitus, type 2) ([address]     [facility] varices without bleeding [facility]     Essential hypertension     Hepatitis     A and B    Mental disability     Mixed hyperlipidemia     Portal hypertension ([facility]     TB (tuberculosis)         Past Surgical History:   Procedure Laterality Date    CATARACT REMOVAL  1977    CHOLECYSTECTOMY  2007    COLONOSCOPY  03/21/2016    -polyps,diverticulosis,hemorrhoids    COLONOSCOPY  11/29/2017    -hemorrhoids    ENDOSCOPY, COLON, DIAGNOSTIC      EGD numerous times    ENDOSCOPY, COLON, DIAGNOSTIC      most recent 11-05-12    EYE SURGERY      bilateral cataracts    NASAL POLYP SURGERY      NOSE SURGERY      [address] COLON CA SCRN NOT  [address]  IND N/A 11/29/2017    COLONOSCOPY performed by Vicki Davis MD at [address] ESOPHAGOGASTRODUODENOSCOPY TRANSORAL DIAGNOSTIC N/A 3/14/2017    EGD ESOPHAGOGASTRODUODENOSCOPY performed by Vicki Davis MD at [address] ESOPHAGOGASTRODUODENOSCOPY TRANSORAL DIAGNOSTIC N/A 11/29/2017    EGD ESOPHAGOGASTRODUODENOSCOPY performed by Vicki Davis MD at [address] ESOPHAGOGASTRODUODENOSCOPY TRANSORAL DIAGNOSTIC N/A 8/15/2018    EGD ESOPHAGOGASTRODUODENOSCOPY performed by Vicki Davis MD at [address] [facility]  08-    Dr. Papa Spicer  11/4/13    Varicies banding x4    UPPER GASTROINTESTINAL ENDOSCOPY abdominal pain, blood in stool, constipation, diarrhea, nausea and vomiting. Genitourinary: Negative for decreased urine volume, difficulty urinating, hematuria and urgency. Musculoskeletal: Negative for arthralgias, joint swelling and myalgias. Skin: Negative for color change, pallor and rash. Neurological: Negative for dizziness, weakness, light-headedness, numbness and headaches. Hematological: Negative for adenopathy. Does not bruise/bleed easily. Psychiatric/Behavioral: Negative for behavioral problems and confusion. Physical Exam   Constitutional: He is oriented to person, place, and time. He appears well-developed and well-nourished. No distress. HENT:   Head: Normocephalic. Eyes: Pupils are equal, round, and reactive to light. No scleral icterus. Neck: Neck supple. No thyromegaly present. Cardiovascular: Normal rate and regular rhythm. No murmur heard. Pulmonary/Chest: Effort normal and breath sounds normal. No respiratory distress. He has no wheezes. He has no rales. Abdominal: Soft. He exhibits no mass. There is no hepatosplenomegaly. There is no tenderness. Musculoskeletal: He exhibits no edema or tenderness. Lymphadenopathy:     He has no cervical adenopathy. He has no axillary adenopathy. Neurological: He is alert and oriented to person, place, and time. No cranial nerve deficit. Skin: Skin is warm and dry. No cyanosis. Nails show no clubbing. Psychiatric: He has a normal mood and affect. His behavior is normal. Thought content normal.   Nursing note and vitals reviewed. Results for orders placed or performed during the hospital encounter of 04/09/19   Glucose, Whole Blood   Result Value Ref Range    POC Glucose 130 (H) 74 - 100 mg/dL       ASSESSMENT:      Diagnosis Orders   1. Thrombocytopenia (HCC)  CBC Auto Differential    Comprehensive Metabolic Panel    Soluble transferrin receptor    Ferritin    Iron and TIBC    Vitamin B12 & Folate   2.  Neutropenia, unspecified type ([facility]     3. Cirrhosis of liver without ascites, unspecified hepatic cirrhosis type ([facility]     4. Portal hypertension ([facility]     5. Iron deficiency anemia, unspecified iron deficiency anemia type  CBC Auto Differential    Comprehensive Metabolic Panel    Soluble transferrin receptor    Ferritin    Iron and TIBC    Vitamin B12 & Folate   6. Intestinal malabsorption, unspecified type     7. History of colon polyps       Leukopenia and thrombocytopenia secondary to cirrhosis of the liver and possibly hypersplenism. His platelet count is stable. Iron deficiency. Been on oral iron and his iron stores seem to be adequate. Hemoglobin is 12.3. I We will continue observation and see him back again in 3 months      PLAN:      Return in about 3 months (around 8/2/2019) for thrombocytopenia, cirrhosis of liver, iron deficiency anemia. Orders Placed This Encounter   Procedures    CBC Auto Differential     Standing Status:   Future     Standing Expiration Date:   5/2/2020    Comprehensive Metabolic Panel     Standing Status:   Future     Standing Expiration Date:   5/2/2020    Soluble transferrin receptor     Standing Status:   Future     Standing Expiration Date:   5/2/2020    Ferritin     Standing Status:   Future     Standing Expiration Date:   5/2/2020    Iron and TIBC     Standing Status:   Future     Standing Expiration Date:   5/2/2020     Order Specific Question:   Is Patient Fasting? Answer:   No     Order Specific Question:   No of Hours? Answer:   No    Vitamin B12 & Folate     Standing Status:   Future     Standing Expiration Date:   5/2/2020     No orders of the defined types were placed in this encounter.           Electronicallysigned by Wilver Gallardo MD on 5/2/2019 at 10:12 AM

## 2019-05-06 ENCOUNTER — CARE COORDINATION (OUTPATIENT)
Dept: CARE COORDINATION | Age: 59
End: 2019-05-06

## 2019-05-16 ENCOUNTER — CARE COORDINATION (OUTPATIENT)
Dept: CARE COORDINATION | Age: 59
End: 2019-05-16

## 2019-05-20 ENCOUNTER — CARE COORDINATION (OUTPATIENT)
Dept: CARE COORDINATION | Age: 59
End: 2019-05-20

## 2019-05-20 NOTE — CARE COORDINATION
Spoke with Mac Snider regarding patient and role of care coordination, states he lives in a group home with 24 hr care and RN available. She feels he has everything he needs, they have a pretty good understanding and control over his diabetes, does not believe there is anything he needs at this time. Will not enroll in Care Coordination at this time.      Lab Results   Component Value Date    LABA1C 8.8 01/04/2019    LABA1C 7.7 09/11/2018    LABA1C 8.1 05/15/2018     Lab Results   Component Value Date    GLUF 115 09/11/2018    LABMICR 7 07/22/2016    LDLCALC 35 01/04/2019    CREATININE 0.82 01/04/2019         Halle Keith RN

## 2019-06-12 ENCOUNTER — ANESTHESIA EVENT (OUTPATIENT)
Dept: OPERATING ROOM | Age: 59
End: 2019-06-12
Payer: MEDICARE

## 2019-06-12 ENCOUNTER — HOSPITAL ENCOUNTER (OUTPATIENT)
Age: 59
Setting detail: OUTPATIENT SURGERY
Discharge: HOME | End: 2019-06-12
Attending: INTERNAL MEDICINE | Admitting: INTERNAL MEDICINE
Payer: MEDICARE

## 2019-06-12 ENCOUNTER — ANESTHESIA (OUTPATIENT)
Dept: OPERATING ROOM | Age: 59
End: 2019-06-12
Payer: MEDICARE

## 2019-06-12 VITALS
RESPIRATION RATE: 13 BRPM | SYSTOLIC BLOOD PRESSURE: 117 MMHG | DIASTOLIC BLOOD PRESSURE: 66 MMHG | OXYGEN SATURATION: 99 %

## 2019-06-12 VITALS
RESPIRATION RATE: 16 BRPM | BODY MASS INDEX: 23.72 KG/M2 | SYSTOLIC BLOOD PRESSURE: 124 MMHG | HEIGHT: 78 IN | WEIGHT: 205 LBS | HEART RATE: 69 BPM | DIASTOLIC BLOOD PRESSURE: 80 MMHG | TEMPERATURE: 97.2 F | OXYGEN SATURATION: 96 %

## 2019-06-12 DIAGNOSIS — K76.6 PORTAL HYPERTENSION (HCC): ICD-10-CM

## 2019-06-12 DIAGNOSIS — I85.10 SECONDARY ESOPHAGEAL VARICES WITHOUT BLEEDING (HCC): Primary | ICD-10-CM

## 2019-06-12 DIAGNOSIS — K74.60 CIRRHOSIS OF LIVER WITHOUT ASCITES, UNSPECIFIED HEPATIC CIRRHOSIS TYPE (HCC): ICD-10-CM

## 2019-06-12 PROCEDURE — 43235 EGD DIAGNOSTIC BRUSH WASH: CPT | Performed by: INTERNAL MEDICINE

## 2019-06-12 PROCEDURE — 6360000002 HC RX W HCPCS: Performed by: NURSE ANESTHETIST, CERTIFIED REGISTERED

## 2019-06-12 PROCEDURE — 2580000003 HC RX 258: Performed by: INTERNAL MEDICINE

## 2019-06-12 PROCEDURE — 2709999900 HC NON-CHARGEABLE SUPPLY: Performed by: INTERNAL MEDICINE

## 2019-06-12 PROCEDURE — 7100000011 HC PHASE II RECOVERY - ADDTL 15 MIN: Performed by: INTERNAL MEDICINE

## 2019-06-12 PROCEDURE — 7100000010 HC PHASE II RECOVERY - FIRST 15 MIN: Performed by: INTERNAL MEDICINE

## 2019-06-12 PROCEDURE — 2500000003 HC RX 250 WO HCPCS: Performed by: NURSE ANESTHETIST, CERTIFIED REGISTERED

## 2019-06-12 PROCEDURE — 3700000000 HC ANESTHESIA ATTENDED CARE: Performed by: INTERNAL MEDICINE

## 2019-06-12 PROCEDURE — 3609017100 HC EGD: Performed by: INTERNAL MEDICINE

## 2019-06-12 RX ORDER — LIDOCAINE HYDROCHLORIDE 20 MG/ML
INJECTION, SOLUTION EPIDURAL; INFILTRATION; INTRACAUDAL; PERINEURAL PRN
Status: DISCONTINUED | OUTPATIENT
Start: 2019-06-12 | End: 2019-06-12 | Stop reason: SDUPTHER

## 2019-06-12 RX ORDER — PROPOFOL 10 MG/ML
INJECTION, EMULSION INTRAVENOUS PRN
Status: DISCONTINUED | OUTPATIENT
Start: 2019-06-12 | End: 2019-06-12 | Stop reason: SDUPTHER

## 2019-06-12 RX ORDER — SODIUM CHLORIDE, SODIUM LACTATE, POTASSIUM CHLORIDE, CALCIUM CHLORIDE 600; 310; 30; 20 MG/100ML; MG/100ML; MG/100ML; MG/100ML
INJECTION, SOLUTION INTRAVENOUS CONTINUOUS
Status: DISCONTINUED | OUTPATIENT
Start: 2019-06-12 | End: 2019-06-12 | Stop reason: HOSPADM

## 2019-06-12 RX ADMIN — PROPOFOL 100 MG: 10 INJECTION, EMULSION INTRAVENOUS at 10:18

## 2019-06-12 RX ADMIN — SODIUM CHLORIDE, POTASSIUM CHLORIDE, SODIUM LACTATE AND CALCIUM CHLORIDE: 600; 310; 30; 20 INJECTION, SOLUTION INTRAVENOUS at 09:49

## 2019-06-12 RX ADMIN — LIDOCAINE HYDROCHLORIDE 200 MG: 20 INJECTION, SOLUTION EPIDURAL; INFILTRATION; INTRACAUDAL; PERINEURAL at 10:18

## 2019-06-12 RX ADMIN — PROPOFOL 20 MG: 10 INJECTION, EMULSION INTRAVENOUS at 10:21

## 2019-06-12 NOTE — OP NOTE
PROCEDURE NOTE    DATE OF PROCEDURE: 6/12/2019     ENDOSCOPIST: Iker Garland. Norah Abdul MD, [facility]    ASSISTANT: None    PREOPERATIVE DIAGNOSIS: Portal hypertension with esophageal varices    POSTOPERATIVE DIAGNOSIS: -Same    OPERATION: EGD --diagnostic    ANESTHESIA: MAC     ESTIMATED BLOOD LOSS:  None    COMPLICATIONS: None. SPECIMENS: were not obtained    HISTORY: The patient is a 60y.o. year old male with history of above preop diagnosis. Esophagogastroduodenoscopy with possible biopsy and dilation has been recommended. I explained the risk, benefits, expected outcome, and alternatives to the procedure. Risks include but are not limited to bleeding, infection, respiratory distress, hypotension, and perforation of the esophagus, stomach, or duodenum. Patient understands and is in agreement. PROCEDURE: The patient was given monitored anesthesia care. The patient was given oxygen by nasal cannula. The endoscope was inserted orally and advanced under direct vision through the esophagus, through the stomach, through the pylorus, and into the descending duodenum. Findings:  Duodenum:     Descending: normal    Bulb: normal    Stomach:    Antrum: normal    Body: abnormal: Portal hypertensive gastropathy of the upper body    Fundus: abnormal: Portal hypertensive gastropathy    Esophagus: abnormal: Distal esophageal deformity/scarring from prior variceal ligation, two columns of grade 2 varices more proximally    The scope was removed and the patient tolerated the procedure well.        Electronically signed by Ruben Rosario MD  on 6/12/2019 at 10:35 AM

## 2019-06-12 NOTE — ANESTHESIA PRE PROCEDURE
Department of Anesthesiology  Preprocedure Note       Name:  Kaitlin Bundy   Age:  60 y.o.  :  1960                                          MRN:  [id number]         Date:  2019      Surgeon: Whitney Crabtree):  Suhas Salazar MD    Procedure: EGD ESOPHAGOGASTRODUODENOSCOPY (N/A )    Medications prior to admission:   Prior to Admission medications    Medication Sig Start Date End Date Taking? Authorizing Provider   sodium chloride (SALINE MIST) 0.65 % nasal spray 2 sprays by Nasal route daily 19   Rickey Mcmahan MD   metoprolol tartrate (LOPRESSOR) 25 MG tablet GIVE 1 TABLET BY MOUTH ONCE DAILY 19   Rickey Mcmahan MD   lisinopril (PRINIVIL;ZESTRIL) 2.5 MG tablet Take 1 tablet by mouth daily 19   Rickey Mcmahan MD   glipiZIDE (GLUCOTROL XL) 10 MG extended release tablet Take 1 tablet by mouth 2 times daily 19   Rickey Mcmahan MD   docusate sodium (COLACE) 100 MG capsule Take 1 capsule by mouth 2 times daily 19   Rickey Mcmahan MD   Multiple Vitamin (DAILY-BEHZAD) TABS Take 1 tablet by mouth daily 19   Rickey Mcmahan MD   metFORMIN (GLUCOPHAGE-XR) 500 MG extended release tablet Take 2 tablets by mouth 2 times daily 19   Rickey Mcmahan MD   ferrous sulfate 325 (65 Fe) MG tablet Take 1 tablet by mouth daily 19   Jeannine Valle MD   simvastatin (ZOCOR) 20 MG tablet Take 1 tablet by mouth nightly 19   Rickey Mcmahan MD   ONE TOUCH ULTRASOFT LANCETS MISC Test BS daily at 8am and 2 hours post supper weekly on Saturday .      Dx. E11.9 19   Rickey Mcmahan MD   chlorhexidine (PERIDEX) 0.12 % solution Take 15 mLs by mouth daily 19   Rickey Mcmhaan MD   acetaminophen (MAPAP) 325 MG tablet Take 2 tablets by mouth every 6 hours as needed for Pain 19   Rickey Mcmahan MD   blood glucose test strips (ONE TOUCH ULTRA TEST) strip 1 each by Does not apply route 3 times daily 19   Rickey Mcmahan MD   Insulin Pen Needle (BD ULTRA-FINE PEN NEEDLES) 29G X 12.7MM MISC 1 each by Does not apply route daily 1/21/19   Kalpana Alston MD   dextran 70-hypromellose (GENTEAL TEARS) 0.1-0.3 % SOLN opthalmic solution Place 1 drop into both eyes 3 times daily    Historical Provider, MD   loratadine (CLARITIN) 10 MG tablet Take 1 tablet by mouth daily 12/10/18   Kalpana Alston MD   [facility] KWIKPEN 100 UNIT/ML injection pen INJECT 30 UNITS INTO THE SKIN EVERY MORNING 11/5/18   Kalpana Alston MD   insulin glargine [facility]) 100 UNIT/ML injection pen Inject 30 Units into the skin every morning 10/2/18   Kalpana Alston MD   EPIPEN 2-AGGIE 0.3 MG/0.3ML SOAJ injection INJECT 1 PEN INTRAMUSCULARLY AS DIRECTED AS NEEDED FOR REACTION TO BEE STING *CALL PHARMACY FOR REFILLS* 9/7/18   Kalpana Alston MD   lamoTRIgine (LAMICTAL) 100 MG tablet Take 1 tablet by mouth daily 8/1/18   Kalpana Alston MD   naproxen (NAPROSYN) 250 MG tablet TAKE 1 TABLET BY MOUTH ONCE DAILY AS NEEDED FOR PAIN OR HEADACHES *MUST CALL PHARMACY FOR REFILLS* [id number] 12/15/17   Kalpana Alston MD   SM ANTACID/ANTIGAS [phone] MG/5ML SUSP suspension TAKE 30ML BY MOUTH EVERY 4 HOURS AS NEEDED FOR STOMACH ACID,INDIGESTION OR HEARTBURN OR GAS *CALL PHARMACY FOR REFILLS* 12/15/17   Kalpana Alston MD   terbinafine (LAMISIL) 1 % cream Apply topically 2 times daily. 10/27/17   Fabrizio Levy, APRN - CNP   Dimethicone (AVEENO DAILY MOISTURIZING) 1.25 % LOTN APPLY TOPICALLY TO FEET ONCE WEEKLY 6/28/17   Kalpana Alston MD   Hypromellose (GENTEAL MILD TO MODERATE OP) Place 1 drop into both eyes 2 times daily    Historical Provider, MD   miconazole (MICOTIN) 2 % powder Apply topically every morning Apply topically 2 times daily. Historical Provider, MD   Disposable Gloves (LATEX GLOVES ONE SIZE) MISC Use as directed.   Dispense 1 box 1/27/15   Kalpana Alston MD   NONFORMULARY Sea salt nose spray 2 sprays each nostril daily    Historical Provider, MD Polyvinyl Alcohol-Povidone (FRESHKOTE OP) Apply 1 drop to eye 2 times daily. Historical Provider, MD   Throat Lozenges (HALLS COUGH DROPS MT) Take 1 lozenge by mouth every 4 hours as needed. Historical Provider, MD       Current medications:    No current facility-administered medications for this visit. No current outpatient medications on file. Facility-Administered Medications Ordered in Other Visits   Medication Dose Route Frequency Provider Last Rate Last Dose    lactated ringers infusion   Intravenous Continuous Radha Washington  mL/hr at 06/12/19 0949         Allergies:     Allergies   Allergen Reactions    Bee Venom Swelling       Problem List:    Patient Active Problem List   Diagnosis Code    Esophageal varices (HCC) I85.00    Hypertension I10    Candidal balanitis B37.42    Type 2 diabetes mellitus without complication, without long-term current use of insulin (HCC) E11.9    Mixed hyperlipidemia E78.2    Neutropenia (HCC) D70.9    Thrombocytopenia (HCC) D69.6    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    Cirrhosis of liver (HCC) K74.60    Portal hypertension (HCC) K76.6    Iron deficiency anemia D50.9    Malabsorption K90.9    History of colon polyps Z86.010    PVD (peripheral vascular disease) (HCC) I73.9    Rotator cuff impingement syndrome of right shoulder M75.41       Past Medical History:        Diagnosis Date    Cataracts, bilateral     Cirrhosis ([address]     DM type 2 (diabetes mellitus, type 2) ([address]     Esophageal varices without bleeding ([address]     Essential hypertension     Hepatitis     A and B    Mental disability     Mixed hyperlipidemia     Portal hypertension ([address]     TB (tuberculosis)        Past Surgical History:        Procedure Laterality Date    CATARACT REMOVAL  1977    CHOLECYSTECTOMY  2007    COLONOSCOPY  03/21/2016    -polyps,diverticulosis,hemorrhoids    COLONOSCOPY  11/29/2017    -hemorrhoids    Maritza-portal hypertension with esophageal varices & portal hypertensive gastropathy, variceal band ligation       Social History:    Social History     Tobacco Use    Smoking status: Never Smoker    Smokeless tobacco: Never Used   Substance Use Topics    Alcohol use: No                                Counseling given: Not Answered      Vital Signs (Current): There were no vitals filed for this visit. BP Readings from Last 3 Encounters:   06/12/19 120/76   05/02/19 114/69   04/09/19 124/74       NPO Status:                                                                                 BMI:   Wt Readings from Last 3 Encounters:   06/12/19 205 lb (93 kg)   05/02/19 196 lb 6.4 oz (89.1 kg)   04/09/19 200 lb (90.7 kg)     There is no height or weight on file to calculate BMI.    CBC:   Lab Results   Component Value Date    WBC 3.3 12/12/2017    RBC 4.37 12/12/2017    HGB 13.8 12/12/2017    HCT 41.5 12/12/2017    MCV 95.0 12/12/2017    RDW 14.5 12/12/2017    PLT 50 12/12/2017       CMP:   Lab Results   Component Value Date     01/04/2019    K 4.0 01/04/2019     01/04/2019    CO2 29 01/04/2019    BUN 12 01/04/2019    CREATININE 0.82 01/04/2019    GFRAA >60 12/12/2017    LABGLOM >60 12/12/2017    GLUCOSE 145 01/04/2019    GLUCOSE 88 02/08/2012    PROT 7.2 12/12/2017    CALCIUM 9.3 01/04/2019    BILITOT 1.1 01/04/2019    ALKPHOS 71 01/04/2019    AST 22 01/04/2019    ALT 20 01/04/2019       POC Tests:   No results for input(s): POCGLU, POCNA, POCK, POCCL, POCBUN, POCHEMO, POCHCT in the last 72 hours.     Coags: No results found for: PROTIME, INR, APTT    HCG (If Applicable): No results found for: PREGTESTUR, PREGSERUM, HCG, HCGQUANT     ABGs: No results found for: PHART, PO2ART, HOG3XPR, KDU0ASZ, BEART, H0OTCETI     Type & Screen (If Applicable):  No results found for: KENISHA [facility]    Anesthesia Evaluation  Patient summary reviewed no history of anesthetic complications:   Airway: Mallampati: III  TM distance: >3 FB   Neck ROM: full  Mouth opening: > = 3 FB Dental:          Pulmonary: breath sounds clear to auscultation                             Cardiovascular:  Exercise tolerance: poor (<4 METS),   (+) hypertension: mild and moderate,         Rhythm: regular  Rate: normal                    Neuro/Psych:               GI/Hepatic/Renal:   (+) hepatitis: A and B, liver disease: portal hypertension, esophageal varices,           Endo/Other:    (+) DiabetesType II DM, no interval change, using insulin, . Abdominal:       Abdomen: soft. Vascular:                                          Anesthesia Plan      general and TIVA     ASA 3       Induction: intravenous. Anesthetic plan and risks discussed with patient. Plan discussed with CRNA.                   [address] [place], [place] [address]SUZANNE - CRNA   6/12/2019

## 2019-06-12 NOTE — ANESTHESIA POSTPROCEDURE EVALUATION
Department of Anesthesiology  Postprocedure Note    Patient: Joselo Gonzalez  MRN: [id number]  YOB: 1960  Date of evaluation: 6/12/2019  Time:  10:43 AM     Procedure Summary     Date:  06/12/19 [facility] / [facility]    Anesthesia Start:  2422 Anesthesia Stop:  4476    Procedure:  EGD ESOPHAGOGASTRODUODENOSCOPY (N/A ) Diagnosis:  (DYSPEPSIA, ESOPHAGEAL VARICES WITHOUT BLEEDING, PORTAL HYPERTENSION ,CIRRHOSIS OFLIVER WITHOUT ASCITES, UNSPECIFIED HEPATIC CIRRHOSIS)    Surgeon:  Ricci Bennett MD Responsible Provider:  Abigail Gowers, APRN - CRNA    Anesthesia Type:  general, TIVA ASA Status:  3          Anesthesia Type: general, TIVA    Luisa Phase I:      Lusia Phase II:      Last vitals: Reviewed and per EMR flowsheets.        Anesthesia Post Evaluation    Patient location during evaluation: PACU  Patient participation: complete - patient participated  Level of consciousness: awake and alert  Airway patency: patent  Nausea & Vomiting: no vomiting and no nausea  Complications: no  Cardiovascular status: hemodynamically stable  Respiratory status: room air and spontaneous ventilation  Hydration status: stable

## 2019-06-12 NOTE — PROGRESS NOTES
Discharge instructions reviewed with patient and patient's caregiver. Caregiver implies understanding. Patient taking a snack and drinking fluids at this time.

## 2019-06-12 NOTE — H&P
varices,hypertensive gastropathy    UPPER GASTROINTESTINAL ENDOSCOPY  03/14/2017    Dr Lynda Charles varices,gastric varices    UPPER GASTROINTESTINAL ENDOSCOPY  11/29/2017    -portal hypertension,esophageal varices    UPPER GASTROINTESTINAL ENDOSCOPY  08/15/2018    Dr. Weiner Shallow hypertension [facility] esophageal varices    UPPER GASTROINTESTINAL ENDOSCOPY  03/05/2019    Dr Bijal Santiago band ligation,portal hypertension with esophageal varices portal hypertensive gastropathy    UPPER GASTROINTESTINAL ENDOSCOPY N/A 3/5/2019    EGD BAND LIGATION performed by Kayla Shin MD at [address]  04/09/2019    per Dr. Manasa Butterfield N/A 4/9/2019    Maritza-portal hypertension with esophageal varices & portal hypertensive gastropathy, variceal band ligation     Current Facility-Administered Medications   Medication Dose Route Frequency Provider Last Rate Last Dose    lactated ringers infusion   Intravenous Continuous Kayla Shin  mL/hr at 06/12/19 0949       Allergies   Allergen Reactions    Bee Venom Swelling     History reviewed. No pertinent family history.   Social History     Socioeconomic History    Marital status: Single     Spouse name: Not on file    Number of children: Not on file    Years of education: Not on file    Highest education level: Not on file   Occupational History    Not on file   Social Needs    Financial resource strain: Not on file    Food insecurity:     Worry: Not on file     Inability: Not on file    Transportation needs:     Medical: Not on file     Non-medical: Not on file   Tobacco Use    Smoking status: Never Smoker    Smokeless tobacco: Never Used   Substance and Sexual Activity    Alcohol use: No    Drug use: No    Sexual activity: Not on file   Lifestyle    Physical activity:     Days per week: Not on file     Minutes per session: Not on file    Stress: Not on file Relationships    Social connections:     Talks on phone: Not on file     Gets together: Not on file     Attends Mu-ism service: Not on file     Active member of club or organization: Not on file     Attends meetings of clubs or organizations: Not on file     Relationship status: Not on file    Intimate partner violence:     Fear of current or ex partner: Not on file     Emotionally abused: Not on file     Physically abused: Not on file     Forced sexual activity: Not on file   Other Topics Concern    Not on file   Social History Narrative    Not on file     ROS: Non-contributory    Physical Exam:  Vitals:    06/12/19 0930   BP: 120/76   Pulse: 63   Resp: 20   Temp: 97.3 °F (36.3 °C)   SpO2: 97%       Chest: Breath sounds were clear and equal with no rales, wheezes, or rhonchi. Respiratory effort was normal with no retractions or use of accessory muscles. Cardiovascular: Heart sounds were normal with a regular rate and rhythm. There were no murmurs, gallops or rubs. Abdomen: Bowel sounds were normal.  The abdomen was soft and non distended. There was no tenderness, guarding, rebound, or rigidity. There were no masses, hepatosplenomegaly, or hernias.     Plan: EGD with possible banding      Electronically by Adeline Carney MD  on 6/12/2019 at 10:06 AM

## 2019-06-12 NOTE — PROGRESS NOTES
Discharge Criteria    Inpatients must meet Criteria 1 through 7. All other patients are either YES or N/A. If a NO is chosen then Anesthesia or Surgeon must be notified. 1.  Minimum 30 minutes after last dose of sedative medication, minimum 120 minutes after last dose of reversal agent. Yes      2. Systolic BP stable within 20 mmHg for 30 minutes & systolic BP between 90 & 615 or within 10 mmHg of baseline. Yes      3. Pulse between 60 and 100 or within 10 bpm of baseline. Yes      4. Spontaneous respiratory rate >/= 10 per minute. Yes      5. SaO2 >/= 95 or  >/= baseline. Yes      6. Able to cough and swallow or return to baseline function. Yes      7. Alert and oriented or return to baseline mental status. Yes      8. Demonstrates controlled, coordinated movements, ambulates with steady gait, or return to baseline activity function. Yes      9. Minimal or no pain or nausea, or at a level tolerable and acceptable to patient. Yes      10. Takes and retains oral fluids as allowed. Yes      11. Procedural / perioperative site stable. Minimal or no bleeding. Yes          12. If GI endoscopy procedure, minimal or no abdominal distention or passing flatus. Yes      13. Written discharge instructions and emergency telephone number provided. Yes      14. Accompanied by a responsible adult. Yes      Adult patient discharged from facility without responsible person meets above criteria plus the following:   a) remains awake without stimulus for 30 minutes     b) oriented appropriate for age      c) all vital signs stable   d) no significant risk of losing protective reflexes      e) able to maintain pre-procedure mobility without assistance   f) no nausea or dizziness      g) transportation arrangements that do not require patient to operate motor Vehicle.      N/A

## 2019-07-05 ENCOUNTER — TELEPHONE (OUTPATIENT)
Dept: GASTROENTEROLOGY | Age: 59
End: 2019-07-05

## 2019-08-08 ENCOUNTER — OFFICE VISIT (OUTPATIENT)
Dept: ONCOLOGY | Age: 59
End: 2019-08-08
Payer: MEDICARE

## 2019-08-08 VITALS
BODY MASS INDEX: 22.73 KG/M2 | WEIGHT: 196.5 LBS | SYSTOLIC BLOOD PRESSURE: 124 MMHG | RESPIRATION RATE: 18 BRPM | DIASTOLIC BLOOD PRESSURE: 66 MMHG | TEMPERATURE: 98.1 F | HEIGHT: 78 IN | HEART RATE: 72 BPM

## 2019-08-08 DIAGNOSIS — D50.9 IRON DEFICIENCY ANEMIA, UNSPECIFIED IRON DEFICIENCY ANEMIA TYPE: ICD-10-CM

## 2019-08-08 DIAGNOSIS — K74.60 CIRRHOSIS OF LIVER WITHOUT ASCITES, UNSPECIFIED HEPATIC CIRRHOSIS TYPE (HCC): ICD-10-CM

## 2019-08-08 DIAGNOSIS — D69.6 THROMBOCYTOPENIA (HCC): Primary | ICD-10-CM

## 2019-08-08 DIAGNOSIS — K90.9 INTESTINAL MALABSORPTION, UNSPECIFIED TYPE: ICD-10-CM

## 2019-08-08 DIAGNOSIS — D70.9 NEUTROPENIA, UNSPECIFIED TYPE (HCC): ICD-10-CM

## 2019-08-08 DIAGNOSIS — K76.6 PORTAL HYPERTENSION (HCC): ICD-10-CM

## 2019-08-08 PROCEDURE — G8420 CALC BMI NORM PARAMETERS: HCPCS | Performed by: INTERNAL MEDICINE

## 2019-08-08 PROCEDURE — 3017F COLORECTAL CA SCREEN DOC REV: CPT | Performed by: INTERNAL MEDICINE

## 2019-08-08 PROCEDURE — G8427 DOCREV CUR MEDS BY ELIG CLIN: HCPCS | Performed by: INTERNAL MEDICINE

## 2019-08-08 PROCEDURE — 99214 OFFICE O/P EST MOD 30 MIN: CPT | Performed by: INTERNAL MEDICINE

## 2019-08-08 PROCEDURE — 1036F TOBACCO NON-USER: CPT | Performed by: INTERNAL MEDICINE

## 2019-08-08 RX ORDER — FERROUS SULFATE 325(65) MG
325 TABLET ORAL 2 TIMES DAILY
Qty: 60 TABLET | Refills: 5 | Status: SHIPPED | OUTPATIENT
Start: 2019-08-08 | End: 2019-12-09 | Stop reason: SDUPTHER

## 2019-08-08 ASSESSMENT — ENCOUNTER SYMPTOMS
CONSTIPATION: 0
DIARRHEA: 1
VOMITING: 0
VOICE CHANGE: 0
CHEST TIGHTNESS: 0
TROUBLE SWALLOWING: 0
COLOR CHANGE: 0
EYE ITCHING: 0
COUGH: 0
EYE REDNESS: 0
SORE THROAT: 0
ABDOMINAL PAIN: 0
NAUSEA: 0
SHORTNESS OF BREATH: 0
EYE DISCHARGE: 0
WHEEZING: 0
BLOOD IN STOOL: 0

## 2019-08-08 NOTE — LETTER
Neurological: He is alert and oriented to person, place, and time. No cranial nerve deficit. Skin: Skin is warm and dry. No cyanosis. Nails show no clubbing. Psychiatric: He has a normal mood and affect. His behavior is normal. Thought content normal.   Nursing note and vitals reviewed. Results for orders placed or performed in visit on 06/28/19   Hemoglobin A1C   Result Value Ref Range    Hemoglobin A1C 8.1 %    AVERAGE GLUCOSE 186        ASSESSMENT:      Diagnosis Orders   1. Thrombocytopenia (HCC)  CBC Auto Differential    Comprehensive Metabolic Panel    Soluble transferrin receptor    Ferritin    Iron and TIBC    Vitamin B12 & Folate    ferrous sulfate 325 (65 Fe) MG tablet   2. Cirrhosis of liver without ascites, unspecified hepatic cirrhosis type ([address]     3. Iron deficiency anemia, unspecified iron deficiency anemia type  CBC Auto Differential    Comprehensive Metabolic Panel    Soluble transferrin receptor    Ferritin    Iron and TIBC    Vitamin B12 & Folate    ferrous sulfate 325 (65 Fe) MG tablet   4. Portal hypertension ([address]     5. Intestinal malabsorption, unspecified type  ferrous sulfate 325 (65 Fe) MG tablet   6. Neutropenia, unspecified type (HCC)  ferrous sulfate 325 (65 Fe) MG tablet     Leukopenia and thrombocytopenia secondary to cirrhosis of the liver and possibly hypersplenism. His platelet count is stable. Iron deficiency. I have recommended increasing the iron to twice a day. .  I We will continue observation and see him back again in 3 month      PLAN:      Return in about 3 months (around 11/8/2019) for iron deficiency anemia, cirrhosis of liver, thrombocytopenia.     Orders Placed This Encounter   Procedures    CBC Auto Differential     Standing Status:   Future     Standing Expiration Date:   8/8/2020    Comprehensive Metabolic Panel     Standing Status:   Future     Standing Expiration Date:   8/8/2020    Soluble transferrin receptor     Standing Status:   Future

## 2019-10-15 ENCOUNTER — HOSPITAL ENCOUNTER (OUTPATIENT)
Age: 59
Setting detail: OUTPATIENT SURGERY
Discharge: HOME | End: 2019-10-15
Attending: INTERNAL MEDICINE | Admitting: INTERNAL MEDICINE
Payer: MEDICARE

## 2019-10-15 ENCOUNTER — ANESTHESIA (OUTPATIENT)
Dept: OPERATING ROOM | Age: 59
End: 2019-10-15
Payer: MEDICARE

## 2019-10-15 ENCOUNTER — ANESTHESIA EVENT (OUTPATIENT)
Dept: OPERATING ROOM | Age: 59
End: 2019-10-15
Payer: MEDICARE

## 2019-10-15 VITALS
DIASTOLIC BLOOD PRESSURE: 62 MMHG | OXYGEN SATURATION: 99 % | RESPIRATION RATE: 16 BRPM | SYSTOLIC BLOOD PRESSURE: 107 MMHG

## 2019-10-15 VITALS
BODY MASS INDEX: 24.76 KG/M2 | OXYGEN SATURATION: 97 % | WEIGHT: 214 LBS | RESPIRATION RATE: 18 BRPM | DIASTOLIC BLOOD PRESSURE: 82 MMHG | HEART RATE: 75 BPM | SYSTOLIC BLOOD PRESSURE: 120 MMHG | TEMPERATURE: 97.5 F | HEIGHT: 78 IN

## 2019-10-15 DIAGNOSIS — K74.60 CIRRHOSIS OF LIVER WITHOUT ASCITES, UNSPECIFIED HEPATIC CIRRHOSIS TYPE (HCC): ICD-10-CM

## 2019-10-15 DIAGNOSIS — K76.6 PORTAL HYPERTENSION (HCC): ICD-10-CM

## 2019-10-15 DIAGNOSIS — I85.10 SECONDARY ESOPHAGEAL VARICES WITHOUT BLEEDING (HCC): Primary | ICD-10-CM

## 2019-10-15 LAB — GLUCOSE BLD-MCNC: 161 MG/DL (ref 74–100)

## 2019-10-15 PROCEDURE — 6360000002 HC RX W HCPCS: Performed by: NURSE ANESTHETIST, CERTIFIED REGISTERED

## 2019-10-15 PROCEDURE — 7100000011 HC PHASE II RECOVERY - ADDTL 15 MIN: Performed by: INTERNAL MEDICINE

## 2019-10-15 PROCEDURE — 82947 ASSAY GLUCOSE BLOOD QUANT: CPT

## 2019-10-15 PROCEDURE — 3609018200 HC EGD INJECTION SCLEROSIS ESOPHGL/GASTRIC VARICES: Performed by: INTERNAL MEDICINE

## 2019-10-15 PROCEDURE — 2580000003 HC RX 258: Performed by: INTERNAL MEDICINE

## 2019-10-15 PROCEDURE — 3700000001 HC ADD 15 MINUTES (ANESTHESIA): Performed by: INTERNAL MEDICINE

## 2019-10-15 PROCEDURE — 2720000010 HC SURG SUPPLY STERILE: Performed by: INTERNAL MEDICINE

## 2019-10-15 PROCEDURE — 43244 EGD VARICES LIGATION: CPT | Performed by: INTERNAL MEDICINE

## 2019-10-15 PROCEDURE — 7100000010 HC PHASE II RECOVERY - FIRST 15 MIN: Performed by: INTERNAL MEDICINE

## 2019-10-15 PROCEDURE — 2709999900 HC NON-CHARGEABLE SUPPLY: Performed by: INTERNAL MEDICINE

## 2019-10-15 PROCEDURE — 3700000000 HC ANESTHESIA ATTENDED CARE: Performed by: INTERNAL MEDICINE

## 2019-10-15 PROCEDURE — 2500000003 HC RX 250 WO HCPCS: Performed by: NURSE ANESTHETIST, CERTIFIED REGISTERED

## 2019-10-15 RX ORDER — SODIUM CHLORIDE, SODIUM LACTATE, POTASSIUM CHLORIDE, CALCIUM CHLORIDE 600; 310; 30; 20 MG/100ML; MG/100ML; MG/100ML; MG/100ML
INJECTION, SOLUTION INTRAVENOUS CONTINUOUS
Status: DISCONTINUED | OUTPATIENT
Start: 2019-10-15 | End: 2019-10-15 | Stop reason: HOSPADM

## 2019-10-15 RX ORDER — LIDOCAINE HYDROCHLORIDE 20 MG/ML
INJECTION, SOLUTION INFILTRATION; PERINEURAL PRN
Status: DISCONTINUED | OUTPATIENT
Start: 2019-10-15 | End: 2019-10-15 | Stop reason: SDUPTHER

## 2019-10-15 RX ORDER — PROPOFOL 10 MG/ML
INJECTION, EMULSION INTRAVENOUS PRN
Status: DISCONTINUED | OUTPATIENT
Start: 2019-10-15 | End: 2019-10-15 | Stop reason: SDUPTHER

## 2019-10-15 RX ADMIN — LIDOCAINE HYDROCHLORIDE 100 MG: 20 INJECTION, SOLUTION INFILTRATION; PERINEURAL at 11:01

## 2019-10-15 RX ADMIN — PROPOFOL 100 MG: 10 INJECTION, EMULSION INTRAVENOUS at 11:01

## 2019-10-15 RX ADMIN — PROPOFOL 100 MG: 10 INJECTION, EMULSION INTRAVENOUS at 11:10

## 2019-10-15 RX ADMIN — PROPOFOL 100 MG: 10 INJECTION, EMULSION INTRAVENOUS at 11:05

## 2019-10-15 RX ADMIN — SODIUM CHLORIDE, POTASSIUM CHLORIDE, SODIUM LACTATE AND CALCIUM CHLORIDE: 600; 310; 30; 20 INJECTION, SOLUTION INTRAVENOUS at 10:33

## 2019-10-15 ASSESSMENT — PAIN - FUNCTIONAL ASSESSMENT: PAIN_FUNCTIONAL_ASSESSMENT: 0-10

## 2019-10-15 ASSESSMENT — PAIN SCALES - GENERAL
PAINLEVEL_OUTOF10: 0
PAINLEVEL_OUTOF10: 0

## 2019-10-15 ASSESSMENT — LIFESTYLE VARIABLES: SMOKING_STATUS: 0

## 2019-12-02 ENCOUNTER — ANESTHESIA EVENT (OUTPATIENT)
Dept: OPERATING ROOM | Age: 59
End: 2019-12-02
Payer: MEDICARE

## 2019-12-02 ENCOUNTER — ANESTHESIA (OUTPATIENT)
Dept: OPERATING ROOM | Age: 59
End: 2019-12-02
Payer: MEDICARE

## 2019-12-02 ENCOUNTER — HOSPITAL ENCOUNTER (OUTPATIENT)
Age: 59
Setting detail: OUTPATIENT SURGERY
Discharge: HOME | End: 2019-12-02
Attending: INTERNAL MEDICINE | Admitting: INTERNAL MEDICINE
Payer: MEDICARE

## 2019-12-02 VITALS
WEIGHT: 200 LBS | HEART RATE: 73 BPM | OXYGEN SATURATION: 97 % | BODY MASS INDEX: 23.14 KG/M2 | DIASTOLIC BLOOD PRESSURE: 80 MMHG | TEMPERATURE: 97.4 F | HEIGHT: 78 IN | RESPIRATION RATE: 16 BRPM | SYSTOLIC BLOOD PRESSURE: 119 MMHG

## 2019-12-02 VITALS
RESPIRATION RATE: 8 BRPM | OXYGEN SATURATION: 98 % | DIASTOLIC BLOOD PRESSURE: 71 MMHG | SYSTOLIC BLOOD PRESSURE: 113 MMHG

## 2019-12-02 LAB — GLUCOSE BLD-MCNC: 162 MG/DL (ref 74–100)

## 2019-12-02 PROCEDURE — 43235 EGD DIAGNOSTIC BRUSH WASH: CPT | Performed by: INTERNAL MEDICINE

## 2019-12-02 PROCEDURE — 2580000003 HC RX 258: Performed by: INTERNAL MEDICINE

## 2019-12-02 PROCEDURE — 3700000000 HC ANESTHESIA ATTENDED CARE: Performed by: INTERNAL MEDICINE

## 2019-12-02 PROCEDURE — 6360000002 HC RX W HCPCS: Performed by: NURSE ANESTHETIST, CERTIFIED REGISTERED

## 2019-12-02 PROCEDURE — 2500000003 HC RX 250 WO HCPCS: Performed by: NURSE ANESTHETIST, CERTIFIED REGISTERED

## 2019-12-02 PROCEDURE — 3609017100 HC EGD: Performed by: INTERNAL MEDICINE

## 2019-12-02 PROCEDURE — 2709999900 HC NON-CHARGEABLE SUPPLY: Performed by: INTERNAL MEDICINE

## 2019-12-02 PROCEDURE — 7100000011 HC PHASE II RECOVERY - ADDTL 15 MIN: Performed by: INTERNAL MEDICINE

## 2019-12-02 PROCEDURE — 82947 ASSAY GLUCOSE BLOOD QUANT: CPT

## 2019-12-02 PROCEDURE — 7100000010 HC PHASE II RECOVERY - FIRST 15 MIN: Performed by: INTERNAL MEDICINE

## 2019-12-02 RX ORDER — PROPOFOL 10 MG/ML
INJECTION, EMULSION INTRAVENOUS PRN
Status: DISCONTINUED | OUTPATIENT
Start: 2019-12-02 | End: 2019-12-02 | Stop reason: SDUPTHER

## 2019-12-02 RX ORDER — PROPOFOL 10 MG/ML
INJECTION, EMULSION INTRAVENOUS CONTINUOUS PRN
Status: DISCONTINUED | OUTPATIENT
Start: 2019-12-02 | End: 2019-12-02 | Stop reason: SDUPTHER

## 2019-12-02 RX ORDER — LIDOCAINE HYDROCHLORIDE 20 MG/ML
INJECTION, SOLUTION EPIDURAL; INFILTRATION; INTRACAUDAL; PERINEURAL PRN
Status: DISCONTINUED | OUTPATIENT
Start: 2019-12-02 | End: 2019-12-02 | Stop reason: SDUPTHER

## 2019-12-02 RX ORDER — SODIUM CHLORIDE, SODIUM LACTATE, POTASSIUM CHLORIDE, CALCIUM CHLORIDE 600; 310; 30; 20 MG/100ML; MG/100ML; MG/100ML; MG/100ML
INJECTION, SOLUTION INTRAVENOUS CONTINUOUS
Status: DISCONTINUED | OUTPATIENT
Start: 2019-12-02 | End: 2019-12-02 | Stop reason: HOSPADM

## 2019-12-02 RX ADMIN — PROPOFOL 200 MCG/KG/MIN: 10 INJECTION, EMULSION INTRAVENOUS at 08:07

## 2019-12-02 RX ADMIN — LIDOCAINE HYDROCHLORIDE 100 MG: 20 INJECTION, SOLUTION EPIDURAL; INFILTRATION; INTRACAUDAL at 08:07

## 2019-12-02 RX ADMIN — PROPOFOL 40 MG: 10 INJECTION, EMULSION INTRAVENOUS at 08:08

## 2019-12-02 RX ADMIN — SODIUM CHLORIDE, POTASSIUM CHLORIDE, SODIUM LACTATE AND CALCIUM CHLORIDE: 600; 310; 30; 20 INJECTION, SOLUTION INTRAVENOUS at 07:45

## 2019-12-02 RX ADMIN — PROPOFOL 60 MG: 10 INJECTION, EMULSION INTRAVENOUS at 08:07

## 2019-12-02 ASSESSMENT — LIFESTYLE VARIABLES: SMOKING_STATUS: 0

## 2019-12-02 ASSESSMENT — PAIN - FUNCTIONAL ASSESSMENT: PAIN_FUNCTIONAL_ASSESSMENT: 0-10

## 2019-12-02 ASSESSMENT — PAIN SCALES - GENERAL
PAINLEVEL_OUTOF10: 0
PAINLEVEL_OUTOF10: 0

## 2019-12-09 DIAGNOSIS — K90.9 INTESTINAL MALABSORPTION, UNSPECIFIED TYPE: ICD-10-CM

## 2019-12-09 DIAGNOSIS — D70.9 NEUTROPENIA, UNSPECIFIED TYPE (HCC): ICD-10-CM

## 2019-12-09 DIAGNOSIS — D50.9 IRON DEFICIENCY ANEMIA, UNSPECIFIED IRON DEFICIENCY ANEMIA TYPE: ICD-10-CM

## 2019-12-09 DIAGNOSIS — D69.6 THROMBOCYTOPENIA (HCC): ICD-10-CM

## 2019-12-09 RX ORDER — FERROUS SULFATE 325(65) MG
325 TABLET ORAL 2 TIMES DAILY
Qty: 64 TABLET | Refills: 0 | Status: SHIPPED | OUTPATIENT
Start: 2019-12-09 | End: 2020-01-08 | Stop reason: SDUPTHER

## 2019-12-12 ENCOUNTER — OFFICE VISIT (OUTPATIENT)
Dept: ONCOLOGY | Age: 59
End: 2019-12-12
Payer: MEDICARE

## 2019-12-12 VITALS
WEIGHT: 197.7 LBS | HEART RATE: 69 BPM | TEMPERATURE: 97.7 F | RESPIRATION RATE: 18 BRPM | SYSTOLIC BLOOD PRESSURE: 132 MMHG | BODY MASS INDEX: 22.88 KG/M2 | HEIGHT: 78 IN | DIASTOLIC BLOOD PRESSURE: 63 MMHG

## 2019-12-12 DIAGNOSIS — D70.9 NEUTROPENIA, UNSPECIFIED TYPE (HCC): ICD-10-CM

## 2019-12-12 DIAGNOSIS — K90.9 INTESTINAL MALABSORPTION, UNSPECIFIED TYPE: ICD-10-CM

## 2019-12-12 DIAGNOSIS — K76.6 PORTAL HYPERTENSION (HCC): ICD-10-CM

## 2019-12-12 DIAGNOSIS — D69.6 THROMBOCYTOPENIA (HCC): Primary | ICD-10-CM

## 2019-12-12 DIAGNOSIS — D50.9 IRON DEFICIENCY ANEMIA, UNSPECIFIED IRON DEFICIENCY ANEMIA TYPE: ICD-10-CM

## 2019-12-12 DIAGNOSIS — I85.10 SECONDARY ESOPHAGEAL VARICES WITHOUT BLEEDING (HCC): ICD-10-CM

## 2019-12-12 DIAGNOSIS — K74.60 CIRRHOSIS OF LIVER WITHOUT ASCITES, UNSPECIFIED HEPATIC CIRRHOSIS TYPE (HCC): ICD-10-CM

## 2019-12-12 PROCEDURE — G8427 DOCREV CUR MEDS BY ELIG CLIN: HCPCS | Performed by: INTERNAL MEDICINE

## 2019-12-12 PROCEDURE — 3017F COLORECTAL CA SCREEN DOC REV: CPT | Performed by: INTERNAL MEDICINE

## 2019-12-12 PROCEDURE — 99213 OFFICE O/P EST LOW 20 MIN: CPT | Performed by: INTERNAL MEDICINE

## 2019-12-12 PROCEDURE — G8484 FLU IMMUNIZE NO ADMIN: HCPCS | Performed by: INTERNAL MEDICINE

## 2019-12-12 PROCEDURE — 1036F TOBACCO NON-USER: CPT | Performed by: INTERNAL MEDICINE

## 2019-12-12 PROCEDURE — G8420 CALC BMI NORM PARAMETERS: HCPCS | Performed by: INTERNAL MEDICINE

## 2019-12-12 ASSESSMENT — ENCOUNTER SYMPTOMS
VOICE CHANGE: 0
EYE DISCHARGE: 0
TROUBLE SWALLOWING: 0
VOMITING: 0
DIARRHEA: 0
SORE THROAT: 0
WHEEZING: 0
NAUSEA: 0
COUGH: 0
EYE REDNESS: 0
ABDOMINAL PAIN: 0
EYE ITCHING: 0
BLOOD IN STOOL: 0
COLOR CHANGE: 0
CONSTIPATION: 0
CHEST TIGHTNESS: 0
SHORTNESS OF BREATH: 0

## 2020-01-06 ENCOUNTER — TELEPHONE (OUTPATIENT)
Dept: GASTROENTEROLOGY | Age: 60
End: 2020-01-06

## 2020-01-08 ENCOUNTER — TELEPHONE (OUTPATIENT)
Dept: ONCOLOGY | Age: 60
End: 2020-01-08

## 2020-01-08 RX ORDER — FERROUS SULFATE 325(65) MG
325 TABLET ORAL 2 TIMES DAILY
Qty: 64 TABLET | Refills: 0 | Status: SHIPPED | OUTPATIENT
Start: 2020-01-08 | End: 2020-02-11 | Stop reason: SDUPTHER

## 2020-02-11 RX ORDER — FERROUS SULFATE 325(65) MG
325 TABLET ORAL 2 TIMES DAILY
Qty: 60 TABLET | Refills: 5 | Status: SHIPPED | OUTPATIENT
Start: 2020-02-11 | End: 2020-07-13 | Stop reason: ALTCHOICE

## 2020-03-16 ENCOUNTER — ANESTHESIA EVENT (OUTPATIENT)
Dept: OPERATING ROOM | Age: 60
End: 2020-03-16
Payer: MEDICARE

## 2020-03-16 ENCOUNTER — HOSPITAL ENCOUNTER (OUTPATIENT)
Age: 60
Setting detail: OUTPATIENT SURGERY
Discharge: HOME | End: 2020-03-16
Attending: INTERNAL MEDICINE | Admitting: INTERNAL MEDICINE
Payer: MEDICARE

## 2020-03-16 ENCOUNTER — ANESTHESIA (OUTPATIENT)
Dept: OPERATING ROOM | Age: 60
End: 2020-03-16
Payer: MEDICARE

## 2020-03-16 VITALS
DIASTOLIC BLOOD PRESSURE: 81 MMHG | BODY MASS INDEX: 22.43 KG/M2 | TEMPERATURE: 98.4 F | HEART RATE: 78 BPM | OXYGEN SATURATION: 98 % | WEIGHT: 190 LBS | HEIGHT: 77 IN | SYSTOLIC BLOOD PRESSURE: 121 MMHG | RESPIRATION RATE: 18 BRPM

## 2020-03-16 VITALS
OXYGEN SATURATION: 97 % | RESPIRATION RATE: 19 BRPM | DIASTOLIC BLOOD PRESSURE: 62 MMHG | SYSTOLIC BLOOD PRESSURE: 101 MMHG

## 2020-03-16 LAB — GLUCOSE BLD-MCNC: 159 MG/DL (ref 74–100)

## 2020-03-16 PROCEDURE — 43235 EGD DIAGNOSTIC BRUSH WASH: CPT | Performed by: INTERNAL MEDICINE

## 2020-03-16 PROCEDURE — 2500000003 HC RX 250 WO HCPCS: Performed by: NURSE ANESTHETIST, CERTIFIED REGISTERED

## 2020-03-16 PROCEDURE — 2709999900 HC NON-CHARGEABLE SUPPLY: Performed by: INTERNAL MEDICINE

## 2020-03-16 PROCEDURE — 3609017100 HC EGD: Performed by: INTERNAL MEDICINE

## 2020-03-16 PROCEDURE — 3700000001 HC ADD 15 MINUTES (ANESTHESIA): Performed by: INTERNAL MEDICINE

## 2020-03-16 PROCEDURE — 2580000003 HC RX 258: Performed by: INTERNAL MEDICINE

## 2020-03-16 PROCEDURE — 82947 ASSAY GLUCOSE BLOOD QUANT: CPT

## 2020-03-16 PROCEDURE — 6360000002 HC RX W HCPCS: Performed by: NURSE ANESTHETIST, CERTIFIED REGISTERED

## 2020-03-16 PROCEDURE — 7100000011 HC PHASE II RECOVERY - ADDTL 15 MIN: Performed by: INTERNAL MEDICINE

## 2020-03-16 PROCEDURE — 7100000010 HC PHASE II RECOVERY - FIRST 15 MIN: Performed by: INTERNAL MEDICINE

## 2020-03-16 PROCEDURE — 3700000000 HC ANESTHESIA ATTENDED CARE: Performed by: INTERNAL MEDICINE

## 2020-03-16 RX ORDER — LIDOCAINE HYDROCHLORIDE 20 MG/ML
INJECTION, SOLUTION EPIDURAL; INFILTRATION; INTRACAUDAL; PERINEURAL PRN
Status: DISCONTINUED | OUTPATIENT
Start: 2020-03-16 | End: 2020-03-16 | Stop reason: SDUPTHER

## 2020-03-16 RX ORDER — PROPOFOL 10 MG/ML
INJECTION, EMULSION INTRAVENOUS PRN
Status: DISCONTINUED | OUTPATIENT
Start: 2020-03-16 | End: 2020-03-16 | Stop reason: SDUPTHER

## 2020-03-16 RX ORDER — SODIUM CHLORIDE, SODIUM LACTATE, POTASSIUM CHLORIDE, CALCIUM CHLORIDE 600; 310; 30; 20 MG/100ML; MG/100ML; MG/100ML; MG/100ML
INJECTION, SOLUTION INTRAVENOUS CONTINUOUS
Status: DISCONTINUED | OUTPATIENT
Start: 2020-03-16 | End: 2020-03-16 | Stop reason: HOSPADM

## 2020-03-16 RX ADMIN — LIDOCAINE HYDROCHLORIDE 100 MG: 20 INJECTION, SOLUTION EPIDURAL; INFILTRATION; INTRACAUDAL; PERINEURAL at 09:22

## 2020-03-16 RX ADMIN — LIDOCAINE HYDROCHLORIDE 100 MG: 20 INJECTION, SOLUTION EPIDURAL; INFILTRATION; INTRACAUDAL; PERINEURAL at 09:21

## 2020-03-16 RX ADMIN — PROPOFOL 50 MG: 10 INJECTION, EMULSION INTRAVENOUS at 09:25

## 2020-03-16 RX ADMIN — SODIUM CHLORIDE, POTASSIUM CHLORIDE, SODIUM LACTATE AND CALCIUM CHLORIDE: 600; 310; 30; 20 INJECTION, SOLUTION INTRAVENOUS at 08:44

## 2020-03-16 RX ADMIN — PROPOFOL 30 MG: 10 INJECTION, EMULSION INTRAVENOUS at 09:23

## 2020-03-16 RX ADMIN — PROPOFOL 80 MG: 10 INJECTION, EMULSION INTRAVENOUS at 09:21

## 2020-03-16 ASSESSMENT — PAIN - FUNCTIONAL ASSESSMENT: PAIN_FUNCTIONAL_ASSESSMENT: 0-10

## 2020-03-16 ASSESSMENT — PAIN SCALES - GENERAL
PAINLEVEL_OUTOF10: 0
PAINLEVEL_OUTOF10: 0

## 2020-03-16 ASSESSMENT — LIFESTYLE VARIABLES: SMOKING_STATUS: 0

## 2020-03-16 NOTE — H&P
on file     Non-medical: Not on file   Tobacco Use    Smoking status: Never Smoker    Smokeless tobacco: Never Used   Substance and Sexual Activity    Alcohol use: No    Drug use: No    Sexual activity: Not on file   Lifestyle    Physical activity     Days per week: Not on file     Minutes per session: Not on file    Stress: Not on file   Relationships    Social connections     Talks on phone: Not on file     Gets together: Not on file     Attends [employer] service: Not on file     Active member of club or organization: Not on file     Attends meetings of clubs or organizations: Not on file     Relationship status: Not on file    Intimate partner violence     Fear of current or ex partner: Not on file     Emotionally abused: Not on file     Physically abused: Not on file     Forced sexual activity: Not on file   Other Topics Concern    Not on file   Social History Narrative    Not on file     ROS: Non-contributory    Physical Exam:  Vitals:    03/16/20 0829   BP: 115/67   Pulse: 80   Resp: 18   Temp: 97.6 °F (36.4 °C)   SpO2: 98%       Chest: Breath sounds were clear and equal with no rales, wheezes, or rhonchi. Respiratory effort was normal with no retractions or use of accessory muscles. Cardiovascular: Heart sounds were normal with a regular rate and rhythm. There were no murmurs, gallops or rubs. Abdomen: Bowel sounds were normal.  The abdomen was soft and non distended. There was no tenderness, guarding, rebound, or rigidity. There were no masses, hepatosplenomegaly, or hernias.     Plan: EGD      Electronically by Jim Greer MD  on 3/16/2020 at 9:07 AM

## 2020-03-16 NOTE — ANESTHESIA POSTPROCEDURE EVALUATION
Department of Anesthesiology  Postprocedure Note    Patient: Johan John  MRN: [id number]  YOB: 1960  Date of evaluation: 3/16/2020  Time:  9:39 AM     Procedure Summary     Date:  03/16/20 Room / Location:  [address] [place] [address]    Anesthesia Start:  0914 Anesthesia Stop:  9833    Procedure:  EGD ESOPHAGOGASTRODUODENOSCOPY (N/A ) Diagnosis:  (SECONDARY ESOPHAGEAL VARCIES WITHOUT BLEEDING, PORTAL HYPERTENSION, CIRRHOSIS OF LIVER WITHOUT ASCITES, UNSPECIFIED HEPATIC CIRRHOSIS TYPE)    Surgeon:  Leyla Salcido MD Responsible Provider:  SUZANNE Guerin CRNA    Anesthesia Type:  general, TIVA ASA Status:  3          Anesthesia Type: general, TIVA    Luisa Phase I:      Luisa Phase II:      Last vitals: Reviewed and per EMR flowsheets.        Anesthesia Post Evaluation    Patient location during evaluation: PACU  Patient participation: complete - patient cannot participate  Level of consciousness: awake and alert  Airway patency: patent  Nausea & Vomiting: no nausea  Complications: no  Cardiovascular status: hemodynamically stable  Respiratory status: room air and spontaneous ventilation  Hydration status: stable

## 2020-03-16 NOTE — PROGRESS NOTES
Dr. Arlene Ragsdale spoke with pt and visitor. Discharge instructions given to pt and visitor. Discharge Criteria    Inpatients must meet Criteria 1 through 7. All other patients are either YES or N/A. If a NO is chosen then Anesthesia or Surgeon must be notified. 1.  Minimum 30 minutes after last dose of sedative medication, minimum 120 minutes after last dose of reversal agent. Yes      2. Systolic BP stable within 20 mmHg for 30 minutes & systolic BP between 90 & 275 or within 10 mmHg of baseline. Yes      3. Pulse between 60 and 100 or within 10 bpm of baseline. Yes      4. Spontaneous respiratory rate >/= 10 per minute. Yes      5. SaO2 >/= 95 or  >/= baseline. Yes      6. Able to cough and swallow or return to baseline function. Yes      7. Alert and oriented or return to baseline mental status. Yes      8. Demonstrates controlled, coordinated movements, ambulates with steady gait, or return to baseline activity function. Yes      9. Minimal or no pain or nausea, or at a level tolerable and acceptable to patient. Yes      10. Takes and retains oral fluids as allowed. Yes      11. Procedural / perioperative site stable. Minimal or no bleeding. Yes          12. If GI endoscopy procedure, minimal or no abdominal distention or passing flatus. Yes      13. Written discharge instructions and emergency telephone number provided. Yes      14. Accompanied by a responsible adult.     Yes

## 2020-03-16 NOTE — ANESTHESIA PRE PROCEDURE
Department of Anesthesiology  Preprocedure Note       Name:  Leoncio Kinney   Age:  60 y.o.  :  1960                                          MRN:  [id number]         Date:  3/16/2020      Surgeon: Gregor Casey):  Alexander Serrato MD    Procedure: EGD ESOPHAGOGASTRODUODENOSCOPY (N/A )    Medications prior to admission:   Prior to Admission medications    Medication Sig Start Date End Date Taking? Authorizing Provider   ONE TOUCH ULTRASOFT LANCETS MISC TEST BS DAILY AT 8AM AND 2 HOURS POST SUPPER WEEKLY ON SATURDAY .  DX. E11.9 20   Shey Malone MD   blood glucose test strips (ONE TOUCH ULTRA TEST) strip 1 each by Does not apply route 3 times daily 20   Shey Malone MD   chlorhexidine (PERIDEX) 0.12 % solution USE 15 ML SWISH AND SPIT ONCE DAILY *CALL PHARMACY FOR REFILLS* 20   Shey Malone MD   ferrous sulfate 325 (65 Fe) MG tablet Take 1 tablet by mouth 2 times daily 20   Genny Zaidi MD   Multiple Vitamin (DAILY-BEHZAD) TABS Take 1 tablet by mouth daily 20   Shey Malone MD   docusate sodium (COLACE) 100 MG capsule Take 1 capsule by mouth 2 times daily 20   Shey Malone MD   glipiZIDE (GLUCOTROL XL) 10 MG extended release tablet Take 1 tablet by mouth 2 times daily 20   Shey Malone MD   metFORMIN (GLUCOPHAGE-XR) 500 MG extended release tablet Take 2 tablets by mouth 2 times daily 20   Shey Malone MD   insulin glargine [facility] 100 UNIT/ML injection pen Inject 30 Units into the skin every morning 20   Shey Malone MD   aluminum & magnesium hydroxide-simethicone (MI-ACID) 200-200-20 MG/5ML SUSP suspension Take 30 mLs by mouth every 4 hours as needed for Indigestion 19   Shey Malone MD   carbamide peroxide [facility]) 6.5 % otic solution Place 4 drops into both ears 2 times daily Indications:  and Thursday in the evening    Historical Provider, MD   loratadine (CLARITIN) 10 MG tablet Take 1 tablet by DROPS MT) Take 1 lozenge by mouth every 4 hours as needed. Historical Provider, MD       Current medications:    No current facility-administered medications for this visit. No current outpatient medications on file. Facility-Administered Medications Ordered in Other Visits   Medication Dose Route Frequency Provider Last Rate Last Dose    lactated ringers infusion   Intravenous Continuous Baby MD Clif 100 mL/hr at 03/16/20 0844         Allergies:     Allergies   Allergen Reactions    Bee Venom Swelling       Problem List:    Patient Active Problem List   Diagnosis Code    Esophageal varices (HCC) I85.00    Hypertension I10    Candidal balanitis B37.42    Type 2 diabetes mellitus without complication, without long-term current use of insulin (HCC) E11.9    Mixed hyperlipidemia E78.2    Neutropenia (HCC) D70.9    Thrombocytopenia (HCC) D69.6    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    Cirrhosis of liver (HCC) K74.60    Portal hypertension (HCC) K76.6    Iron deficiency anemia D50.9    Malabsorption K90.9    History of colon polyps Z86.010    PVD (peripheral vascular disease) (HCC) I73.9    Rotator cuff impingement syndrome of right shoulder M75.41       Past Medical History:        Diagnosis Date    Cataracts, bilateral     Cirrhosis ([address]     DM type 2 (diabetes mellitus, type 2) ([address]     Esophageal varices without bleeding ([address]     Essential hypertension     Hepatitis     A and B    Mental disability     Mixed hyperlipidemia     Portal hypertension ([address]     TB (tuberculosis)        Past Surgical History:        Procedure Laterality Date    CATARACT REMOVAL  1977    CHOLECYSTECTOMY  2007    COLONOSCOPY  03/21/2016    -polyps,diverticulosis,hemorrhoids    COLONOSCOPY  11/29/2017    -hemorrhoids    ENDOSCOPY, COLON, DIAGNOSTIC      EGD numerous times    ENDOSCOPY, COLON, DIAGNOSTIC      most recent 11-05-12    EYE SURGERY      bilateral N/A 6/12/2019    -portal hypertension with esophageal varices    UPPER GASTROINTESTINAL ENDOSCOPY N/A 10/15/2019    -portal hypertension,esophageal varices-banding    UPPER GASTROINTESTINAL ENDOSCOPY N/A 12/2/2019    -portal hypertensive gastropathy,distal esophageal scarring with grade 2 varices       Social History:    Social History     Tobacco Use    Smoking status: Never Smoker    Smokeless tobacco: Never Used   Substance Use Topics    Alcohol use: No                                Counseling given: Not Answered      Vital Signs (Current): There were no vitals filed for this visit.                                            BP Readings from Last 3 Encounters:   03/16/20 115/67   01/07/20 121/69   12/12/19 132/63       NPO Status:                                                                                 BMI:   Wt Readings from Last 3 Encounters:   03/16/20 190 lb (86.2 kg)   01/07/20 196 lb (88.9 kg)   12/12/19 197 lb 11.2 oz (89.7 kg)     There is no height or weight on file to calculate BMI.    CBC:   Lab Results   Component Value Date    WBC 3.3 12/12/2017    RBC 4.37 12/12/2017    HGB 13.8 12/12/2017    HCT 41.5 12/12/2017    MCV 95.0 12/12/2017    RDW 14.5 12/12/2017    PLT 50 12/12/2017       CMP:   Lab Results   Component Value Date     01/04/2019    K 4.0 01/04/2019     01/04/2019    CO2 29 01/04/2019    BUN 12 01/04/2019    CREATININE 0.82 01/04/2019    GFRAA >60 12/12/2017    LABGLOM >60 12/12/2017    GLUCOSE 145 01/04/2019    GLUCOSE 88 02/08/2012    PROT 7.2 12/12/2017    CALCIUM 9.3 01/04/2019    BILITOT 1.1 01/04/2019    ALKPHOS 71 01/04/2019    AST 22 01/04/2019    ALT 20 01/04/2019       POC Tests:   Recent Labs     03/16/20  0839   POCGLU 159*       Coags: No results found for: PROTIME, INR, APTT    HCG (If Applicable): No results found for: PREGTESTUR, PREGSERUM, HCG, HCGQUANT     ABGs: No results found for: PHART, PO2ART, QVX2IXV, OBG0YPI,

## 2020-03-23 ENCOUNTER — TELEPHONE (OUTPATIENT)
Dept: GASTROENTEROLOGY | Age: 60
End: 2020-03-23

## 2020-06-03 ENCOUNTER — OFFICE VISIT (OUTPATIENT)
Dept: ONCOLOGY | Age: 60
End: 2020-06-03
Payer: MEDICARE

## 2020-06-03 VITALS
HEART RATE: 67 BPM | DIASTOLIC BLOOD PRESSURE: 61 MMHG | HEIGHT: 77 IN | SYSTOLIC BLOOD PRESSURE: 114 MMHG | BODY MASS INDEX: 20.43 KG/M2 | TEMPERATURE: 97.8 F | RESPIRATION RATE: 18 BRPM | WEIGHT: 173 LBS

## 2020-06-03 PROCEDURE — G8420 CALC BMI NORM PARAMETERS: HCPCS | Performed by: INTERNAL MEDICINE

## 2020-06-03 PROCEDURE — 1036F TOBACCO NON-USER: CPT | Performed by: INTERNAL MEDICINE

## 2020-06-03 PROCEDURE — 99211 OFF/OP EST MAY X REQ PHY/QHP: CPT

## 2020-06-03 PROCEDURE — 99214 OFFICE O/P EST MOD 30 MIN: CPT | Performed by: INTERNAL MEDICINE

## 2020-06-03 PROCEDURE — 3017F COLORECTAL CA SCREEN DOC REV: CPT | Performed by: INTERNAL MEDICINE

## 2020-06-03 PROCEDURE — G8428 CUR MEDS NOT DOCUMENT: HCPCS | Performed by: INTERNAL MEDICINE

## 2020-06-03 RX ORDER — CLOTRIMAZOLE AND BETAMETHASONE DIPROPIONATE 10; .64 MG/G; MG/G
CREAM TOPICAL
Qty: 1 TUBE | Refills: 0 | Status: SHIPPED | OUTPATIENT
Start: 2020-06-03 | End: 2020-07-16

## 2020-06-03 NOTE — PROGRESS NOTES
DIAGNOSIS:   1. Thrombocytopenia related to liver disease  2. Iron deficiency anemia, corrected with oral iron  CURRENT THERAPY:  Oral iron  Observation for thrombocytopenia  BRIEF CASE HISTORY:   Florecita Barrios is a very pleasant 63 y.o. male who was by Dr. Sim Matos due to anemia and thrombocytopenia. Work-up suggested cirrhosis and portal hypertension with esophageal varices. Patient was also found to have iron deficiency and was started on oral iron. INTERIM HISTORY:  The patient comes in today for a follow up, he feels well and has no complaints except for irritation and itching in the groin area. Examination showed tinea cruris. He denies any bleeding or bruising. He states in the group home. He has no fever or chills or night sweats. Liver condition has been fairly stable. I try to get more information and most of the information were obtained from his . PAST MEDICAL HISTORY: has a past medical history of Cataracts, bilateral, Cirrhosis ([address] DM type 2 (diabetes mellitus, type 2) ([address] Esophageal varices without bleeding ([address] Essential hypertension, Hepatitis, Mental disability, Mixed hyperlipidemia, Portal hypertension ([address] and TB (tuberculosis). PAST SURGICAL HISTORY: has a past surgical history that includes eye surgery; Nasal polyp surgery; Upper gastrointestinal endoscopy (08-); Upper gastrointestinal endoscopy (11/4/13); Upper gastrointestinal endoscopy (6/23/14); Upper gastrointestinal endoscopy (10-6-14); Upper gastrointestinal endoscopy (1/20/15); Colonoscopy (03/21/2016); Upper gastrointestinal endoscopy (03/21/2016); Upper gastrointestinal endoscopy (09/19/2016); Upper gastrointestinal endoscopy (03/14/2017); pr esophagogastroduodenoscopy transoral diagnostic (N/A, 3/14/2017); Cataract removal (1977); Cholecystectomy (2007); Nose surgery; pr esophagogastroduodenoscopy transoral diagnostic (N/A, 11/29/2017); pr colon ca scrn not hi rsk ind (N/A, 11/29/2017);  Upper gastrointestinal endoscopy (11/29/2017); Colonoscopy (11/29/2017); Upper gastrointestinal endoscopy (08/15/2018); pr esophagogastroduodenoscopy transoral diagnostic (N/A, 8/15/2018); Upper gastrointestinal endoscopy (03/05/2019); Upper gastrointestinal endoscopy (N/A, 3/5/2019); Upper gastrointestinal endoscopy (04/09/2019); Upper gastrointestinal endoscopy (N/A, 4/9/2019); Upper gastrointestinal endoscopy (N/A, 6/12/2019); Upper gastrointestinal endoscopy (N/A, 10/15/2019); Endoscopy, colon, diagnostic; Endoscopy, colon, diagnostic; Upper gastrointestinal endoscopy (N/A, 12/2/2019); and Upper gastrointestinal endoscopy (N/A, 3/16/2020). CURRENT MEDICATIONS:  has a current medication list which includes the following prescription(s): clotrimazole-betamethasone, lisinopril, simvastatin, metoprolol tartrate, loratadine, bd ultra-fine pen needles, one touch ultrasoft lancets, blood glucose test strips, ferrous sulfate, daily-rajeev, docusate sodium, glipizide, metformin, insulin glargine, aluminum & magnesium hydroxide-simethicone, carbamide peroxide, epinephrine, sodium chloride, acetaminophen, dextran 70-hypromellose, lamotrigine, dimethicone, hypromellose, miconazole, latex gloves one size, NONFORMULARY, polyvinyl alcohol-povidone, chlorhexidine, terbinafine, and menthol. ALLERGIES:  is allergic to bee venom. FAMILY HISTORY: Negative for any hematological or oncological conditions. SOCIAL HISTORY:  reports that he has never smoked. He has never used smokeless tobacco. He reports that he does not drink alcohol or use drugs. REVIEW OF SYSTEMS:  General: no fever or night sweats, Weight is stable. ENT: No double or blurred vision, no tinnitus or hearing problem, no dysphagia or sore throat   Respiratory: No chest pain, no shortness of breath, no cough or hemoptysis. Cardiovascular: Denies chest pain, PND or orthopnea. No L E swelling or palpitations.    Gastrointestinal:    No nausea or vomiting,

## 2020-06-08 NOTE — TELEPHONE ENCOUNTER
Scheduled procedure with Dash Dyer ([phone]) for 07/27/2020. Prep instructions faxed to [phone] as requested by Dash Dyer. Order faxed to surgery.

## 2020-07-16 ENCOUNTER — TELEPHONE (OUTPATIENT)
Dept: ONCOLOGY | Age: 60
End: 2020-07-16

## 2020-07-16 RX ORDER — CLOTRIMAZOLE AND BETAMETHASONE DIPROPIONATE 10; .64 MG/G; MG/G
CREAM TOPICAL
Qty: 1 TUBE | Refills: 0 | Status: SHIPPED | OUTPATIENT
Start: 2020-07-16 | End: 2020-08-19 | Stop reason: SDUPTHER

## 2020-07-16 NOTE — TELEPHONE ENCOUNTER
Patient's guardian Maeve Thompson called to request that cream (Lotrisone) that patient received with last visit be made PRN now since rash is resolved. Send prn script to [facility].

## 2020-07-23 ENCOUNTER — HOSPITAL ENCOUNTER (OUTPATIENT)
Dept: PREADMISSION TESTING | Age: 60
Setting detail: SPECIMEN
Discharge: HOME | End: 2020-07-27
Payer: MEDICARE

## 2020-07-23 PROCEDURE — U0003 INFECTIOUS AGENT DETECTION BY NUCLEIC ACID (DNA OR RNA); SEVERE ACUTE RESPIRATORY SYNDROME CORONAVIRUS 2 (SARS-COV-2) (CORONAVIRUS DISEASE [COVID-19]), AMPLIFIED PROBE TECHNIQUE, MAKING USE OF HIGH THROUGHPUT TECHNOLOGIES AS DESCRIBED BY CMS-2020-01-R: HCPCS

## 2020-07-25 LAB — SARS-COV-2, NAA: NOT DETECTED

## 2020-07-26 ENCOUNTER — TELEPHONE (OUTPATIENT)
Dept: PRIMARY CARE CLINIC | Age: 60
End: 2020-07-26

## 2020-07-27 ENCOUNTER — ANESTHESIA (OUTPATIENT)
Dept: OPERATING ROOM | Age: 60
End: 2020-07-27
Payer: MEDICARE

## 2020-07-27 ENCOUNTER — ANESTHESIA EVENT (OUTPATIENT)
Dept: OPERATING ROOM | Age: 60
End: 2020-07-27
Payer: MEDICARE

## 2020-07-27 ENCOUNTER — HOSPITAL ENCOUNTER (OUTPATIENT)
Age: 60
Setting detail: OUTPATIENT SURGERY
Discharge: HOME | End: 2020-07-27
Attending: INTERNAL MEDICINE | Admitting: INTERNAL MEDICINE
Payer: MEDICARE

## 2020-07-27 VITALS
BODY MASS INDEX: 20.08 KG/M2 | TEMPERATURE: 98.6 F | OXYGEN SATURATION: 96 % | DIASTOLIC BLOOD PRESSURE: 59 MMHG | RESPIRATION RATE: 16 BRPM | HEART RATE: 61 BPM | HEIGHT: 78 IN | SYSTOLIC BLOOD PRESSURE: 100 MMHG

## 2020-07-27 VITALS — SYSTOLIC BLOOD PRESSURE: 88 MMHG | DIASTOLIC BLOOD PRESSURE: 47 MMHG | OXYGEN SATURATION: 100 %

## 2020-07-27 LAB — GLUCOSE BLD-MCNC: 101 MG/DL (ref 74–100)

## 2020-07-27 PROCEDURE — 2580000003 HC RX 258: Performed by: INTERNAL MEDICINE

## 2020-07-27 PROCEDURE — 6360000002 HC RX W HCPCS: Performed by: NURSE ANESTHETIST, CERTIFIED REGISTERED

## 2020-07-27 PROCEDURE — 3700000000 HC ANESTHESIA ATTENDED CARE: Performed by: INTERNAL MEDICINE

## 2020-07-27 PROCEDURE — 7100000010 HC PHASE II RECOVERY - FIRST 15 MIN: Performed by: INTERNAL MEDICINE

## 2020-07-27 PROCEDURE — 43239 EGD BIOPSY SINGLE/MULTIPLE: CPT | Performed by: INTERNAL MEDICINE

## 2020-07-27 PROCEDURE — 87077 CULTURE AEROBIC IDENTIFY: CPT

## 2020-07-27 PROCEDURE — 3700000001 HC ADD 15 MINUTES (ANESTHESIA): Performed by: INTERNAL MEDICINE

## 2020-07-27 PROCEDURE — 7100000011 HC PHASE II RECOVERY - ADDTL 15 MIN: Performed by: INTERNAL MEDICINE

## 2020-07-27 PROCEDURE — 82947 ASSAY GLUCOSE BLOOD QUANT: CPT

## 2020-07-27 PROCEDURE — 2500000003 HC RX 250 WO HCPCS: Performed by: NURSE ANESTHETIST, CERTIFIED REGISTERED

## 2020-07-27 PROCEDURE — 2709999900 HC NON-CHARGEABLE SUPPLY: Performed by: INTERNAL MEDICINE

## 2020-07-27 PROCEDURE — 3609012400 HC EGD TRANSORAL BIOPSY SINGLE/MULTIPLE: Performed by: INTERNAL MEDICINE

## 2020-07-27 RX ORDER — FENTANYL CITRATE 50 UG/ML
INJECTION, SOLUTION INTRAMUSCULAR; INTRAVENOUS PRN
Status: DISCONTINUED | OUTPATIENT
Start: 2020-07-27 | End: 2020-07-27 | Stop reason: SDUPTHER

## 2020-07-27 RX ORDER — PROPOFOL 10 MG/ML
INJECTION, EMULSION INTRAVENOUS PRN
Status: DISCONTINUED | OUTPATIENT
Start: 2020-07-27 | End: 2020-07-27 | Stop reason: SDUPTHER

## 2020-07-27 RX ORDER — SODIUM CHLORIDE, SODIUM LACTATE, POTASSIUM CHLORIDE, CALCIUM CHLORIDE 600; 310; 30; 20 MG/100ML; MG/100ML; MG/100ML; MG/100ML
INJECTION, SOLUTION INTRAVENOUS CONTINUOUS
Status: DISCONTINUED | OUTPATIENT
Start: 2020-07-27 | End: 2020-07-27 | Stop reason: HOSPADM

## 2020-07-27 RX ORDER — LIDOCAINE HYDROCHLORIDE 20 MG/ML
INJECTION, SOLUTION EPIDURAL; INFILTRATION; INTRACAUDAL; PERINEURAL PRN
Status: DISCONTINUED | OUTPATIENT
Start: 2020-07-27 | End: 2020-07-27 | Stop reason: SDUPTHER

## 2020-07-27 RX ADMIN — FENTANYL CITRATE 50 MCG: 50 INJECTION, SOLUTION INTRAMUSCULAR; INTRAVENOUS at 15:38

## 2020-07-27 RX ADMIN — LIDOCAINE HYDROCHLORIDE 5 ML: 20 INJECTION, SOLUTION EPIDURAL; INFILTRATION; INTRACAUDAL; PERINEURAL at 15:38

## 2020-07-27 RX ADMIN — PROPOFOL 50 MG: 10 INJECTION, EMULSION INTRAVENOUS at 15:40

## 2020-07-27 RX ADMIN — SODIUM CHLORIDE, POTASSIUM CHLORIDE, SODIUM LACTATE AND CALCIUM CHLORIDE: 600; 310; 30; 20 INJECTION, SOLUTION INTRAVENOUS at 15:03

## 2020-07-27 RX ADMIN — PROPOFOL 50 MG: 10 INJECTION, EMULSION INTRAVENOUS at 15:41

## 2020-07-27 RX ADMIN — PROPOFOL 50 MG: 10 INJECTION, EMULSION INTRAVENOUS at 15:38

## 2020-07-27 ASSESSMENT — LIFESTYLE VARIABLES: SMOKING_STATUS: 0

## 2020-07-27 NOTE — PROGRESS NOTES
Pt and caregiver verbalized readiness to go home. Discharge instructions given to pt and caregiver. Verbalized understanding and all questions answered at this time. Discharge Criteria    Inpatients must meet Criteria 1 through 7. All other patients are either YES or N/A. If a NO is chosen then Anesthesia or Surgeon must be notified. 1.  Minimum 30 minutes after last dose of sedative medication, minimum 120 minutes after last dose of reversal agent. Yes      2. Systolic BP stable within 20 mmHg for 30 minutes & systolic BP between 90 & 720 or within 10 mmHg of baseline. Yes      3. Pulse between 60 and 100 or within 10 bpm of baseline. Yes      4. Spontaneous respiratory rate >/= 10 per minute. Yes      5. SaO2 >/= 95 or  >/= baseline. Yes      6. Able to cough and swallow or return to baseline function. Yes      7. Alert and oriented or return to baseline mental status. Yes      8. Demonstrates controlled, coordinated movements, ambulates with steady gait, or return to baseline activity function. Yes      9. Minimal or no pain or nausea, or at a level tolerable and acceptable to patient. Yes      10. Takes and retains oral fluids as allowed. Yes      11. Procedural / perioperative site stable. Minimal or no bleeding. Yes          12. If GI endoscopy procedure, minimal or no abdominal distention or passing flatus. Yes      13. Written discharge instructions and emergency telephone number provided. Yes      14. Accompanied by a responsible adult.     Yes

## 2020-07-27 NOTE — H&P
History and Physical    Patient's Name/Date of Birth: Sharda Signs / 1960 (13 y.o.)    Date: July 27, 2020     CHIEF COMPLAINT:  Portal hypertension with esophageal varices    Past Medical History:   Diagnosis Date    Cataracts, bilateral     Cirrhosis ([address]     DM [facility]) [facility]     Es[facility] varices without bleeding [facility]     Essential hypertension     Hepatitis     A and B    Mental disability     Mixed hyperlipidemia     Portal hypertension [facility]     TB (tuberculosis)      Past Surgical History:   Procedure Laterality Date    CATARACT REMOVAL  1977    CHOLECYSTECTOMY  2007    COLONOSCOPY  03/21/2016    -polyps,diverticulosis,hemorrhoids    COLONOSCOPY  11/29/2017    -hemorrhoids    ENDOSCOPY, COLON, DIAGNOSTIC      EGD numerous times    ENDOSCOPY, COLON, DIAGNOSTIC      most recent 11-05-12    EYE SURGERY      bilateral cataracts    NASAL POLYP SURGERY      NOSE SURGERY      CT COLON CA SCRN NOT  [address] IND N/A 11/29/2017    COLONOSCOPY performed by Cyndi Scott MD at [address] ESOPHAGOGASTRODUODENOSCOPY TRANSORAL DIAGNOSTIC N/A 3/14/2017    EGD ESOPHAGOGASTRODUODENOSCOPY performed by Cyndi Scott MD at [address] ESOPHAGOGASTRODUODENOSCOPY TRANSORAL DIAGNOSTIC N/A 11/29/2017    EGD ESOPHAGOGASTRODUODENOSCOPY performed by Cyndi Scott MD at [address] ESOPHAGOGASTRODUODENOSCOPY TRANSORAL DIAGNOSTIC N/A 8/15/2018    EGD ESOPHAGOGASTRODUODENOSCOPY performed by Cyndi Scott MD at [facility]  08-    Dr. Tarah Worley  11/4/13    Varicies banding x4    UPPER GASTROINTESTINAL ENDOSCOPY  6/23/14    UPPER GASTROINTESTINAL ENDOSCOPY  10-6-14    Dr. Eleanor Bustillos ENDOSCOPY  1/20/15        UPPER GASTROINTESTINAL ENDOSCOPY  03/21/2016    -bx,portal HTN, varices    UPPER GASTROINTESTINAL ENDOSCOPY 09/19/2016    -esoph varices,hypertensive gastropathy    UPPER GASTROINTESTINAL ENDOSCOPY  03/14/2017    Dr Anisa Heckign varices,gastric varices    UPPER GASTROINTESTINAL ENDOSCOPY  11/29/2017    -portal hypertension,esophageal varices    UPPER GASTROINTESTINAL ENDOSCOPY  08/15/2018    Dr. Leidy Ottoon hypertension [facility] esophageal varices    UPPER GASTROINTESTINAL ENDOSCOPY  03/05/2019    Dr Nazia Arango band ligation,portal hypertension with esophageal varices portal hypertensive gastropathy    UPPER GASTROINTESTINAL ENDOSCOPY N/A 3/5/2019    EGD BAND LIGATION performed by Marco Ashley MD at [facility].  04/09/2019    per Dr. Rosa Vargas N/A 4/9/2019    Maritza-portal hypertension with esophageal varices & portal hypertensive gastropathy, variceal band ligation    UPPER GASTROINTESTINAL ENDOSCOPY N/A 6/12/2019    -portal hypertension with esophageal varices    UPPER GASTROINTESTINAL ENDOSCOPY N/A 10/15/2019    -portal hypertension,esophageal varices-banding    UPPER GASTROINTESTINAL ENDOSCOPY N/A 12/2/2019    -portal hypertensive gastropathy,distal esophageal scarring with grade 2 varices    UPPER GASTROINTESTINAL ENDOSCOPY N/A 3/16/2020    -scarring R/T banding,multiple columns grade 2 esopha varices,portal hypertensive gastropathy     Current Facility-Administered Medications   Medication Dose Route Frequency Provider Last Rate Last Dose    lactated ringers infusion   Intravenous Continuous Marco Ashley  mL/hr at 07/27/20 1503       Allergies   Allergen Reactions    Bee Venom Swelling     History reviewed. No pertinent family history.   Social History     Socioeconomic History    Marital status: Single     Spouse name: Not on file    Number of children: Not on file    Years of education: Not on file    Highest education level: Not on file   Occupational

## 2020-07-27 NOTE — ANESTHESIA POSTPROCEDURE EVALUATION
Department of Anesthesiology  Postprocedure Note    Patient: Heron Pace  MRN: [id number]  YOB: 1960  Date of evaluation: 7/27/2020  Time:  4:50 PM     Procedure Summary     Date:  07/27/20 Room / Location:  [address] [place] [address]    Anesthesia Start:  5159 Anesthesia Stop:  6201    Procedure:  EGD BIOPSY (N/A ) Diagnosis:  (SECONDARY ESOPHAGEAL CARICES WITHOUT BLEEDING)    Surgeon:  Arline Ba MD Responsible Provider:  SUZANNE Roper CRNA    Anesthesia Type:  general, TIVA ASA Status:  3          Anesthesia Type: general, TIVA    Luisa Phase I: Luisa Score: 10    Luisa Phase II: Luisa Score: 9    Last vitals: Reviewed and per EMR flowsheets.        Anesthesia Post Evaluation    Patient location during evaluation: PACU  Patient participation: complete - patient participated  Level of consciousness: awake and alert  Airway patency: patent  Nausea & Vomiting: no nausea and no vomiting  Complications: no  Cardiovascular status: blood pressure returned to baseline and hemodynamically stable  Respiratory status: acceptable and room air  Hydration status: euvolemic

## 2020-07-27 NOTE — ANESTHESIA PRE PROCEDURE
Department of Anesthesiology  Preprocedure Note       Name:  Marie Lanza   Age:  63 y.o.  :  1960                                          MRN:  [id number]         Date:  2020      Surgeon: Adolph Beckham):  Lasha Castano MD    Procedure: EGD ESOPHAGOGASTRODUODENOSCOPY (N/A )    Medications prior to admission:   Prior to Admission medications    Medication Sig Start Date End Date Taking? Authorizing Provider   clotrimazole-betamethasone (LOTRISONE) 1-0.05 % cream Apply topically 2 times daily. PRN 20   Dylan Wood MD   lisinopril (PRINIVIL;ZESTRIL) 2.5 MG tablet Take 1 tablet by mouth daily 20   Enma Montana MD   simvastatin (ZOCOR) 20 MG tablet Take 1 tablet by mouth daily 20   Enma Montnaa MD   metoprolol tartrate (LOPRESSOR) 25 MG tablet Take 1 tablet by mouth daily 20   Enma Montana MD   loratadine (CLARITIN) 10 MG tablet Take 1 tablet by mouth daily 3/30/20   Enma Montana MD   Insulin Pen Needle (BD ULTRA-FINE PEN NEEDLES) 29G X 12.7MM MISC 1 each by Does not apply route daily 3/16/20   Enma Montana MD   ONE TOUCH ULTRASOFT LANCETS MISC TEST BS DAILY AT 8AM AND 2 HOURS POST SUPPER WEEKLY ON SATURDAY .  DX. E11.9 20   Enma Montana MD   blood glucose test strips (ONE TOUCH ULTRA TEST) strip 1 each by Does not apply route 3 times daily 20   Enma Montana MD   chlorhexidine (PERIDEX) 0.12 % solution USE 15 ML SWISH AND SPIT ONCE DAILY *CALL PHARMACY FOR REFILLS* 20   Enma Montana MD   Multiple Vitamin (DAILY-BEHZAD) TABS Take 1 tablet by mouth daily 20   Enma Montana MD   docusate sodium (COLACE) 100 MG capsule Take 1 capsule by mouth 2 times daily 20   Enma Montana MD   glipiZIDE (GLUCOTROL XL) 10 MG extended release tablet Take 1 tablet by mouth 2 times daily 20   Enma Montana MD   metFORMIN (GLUCOPHAGE-XR) 500 MG extended release tablet Take 2 tablets by mouth 2 times daily 20 Lozenges (HALLS COUGH DROPS MT) Take 1 lozenge by mouth every 4 hours as needed. Historical Provider, MD       Current medications:    No current facility-administered medications for this visit. No current outpatient medications on file. Facility-Administered Medications Ordered in Other Visits   Medication Dose Route Frequency Provider Last Rate Last Dose    lactated ringers infusion   Intravenous Continuous Srini Hernandez  mL/hr at 07/27/20 1503         Allergies:     Allergies   Allergen Reactions    Bee Venom Swelling       Problem List:    Patient Active Problem List   Diagnosis Code    Esophageal varices (HCC) I85.00    Hypertension I10    Candidal balanitis B37.42    Type 2 diabetes mellitus without complication, without long-term current use of insulin (HCC) E11.9    Mixed hyperlipidemia E78.2    Neutropenia (HCC) D70.9    Thrombocytopenia (HCC) D69.6    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    Cirrhosis of liver (HCC) K74.60    Portal hypertension (HCC) K76.6    Iron deficiency anemia D50.9    Malabsorption K90.9    History of colon polyps Z86.010    PVD (peripheral vascular disease) (HCC) I73.9    Rotator cuff impingement syndrome of right shoulder M75.41       Past Medical History:        Diagnosis Date    Cataracts, bilateral     Cirrhosis ([address]     DM type 2 (diabetes mellitus, type 2) ([address]     Esophageal varices without bleeding ([address]     Essential hypertension     Hepatitis     A and B    Mental disability     Mixed hyperlipidemia     Portal hypertension ([address]     TB (tuberculosis)        Past Surgical History:        Procedure Laterality Date    CATARACT REMOVAL  1977    CHOLECYSTECTOMY  2007    COLONOSCOPY  03/21/2016    -polyps,diverticulosis,hemorrhoids    COLONOSCOPY  11/29/2017    -hemorrhoids    ENDOSCOPY, COLON, DIAGNOSTIC      EGD numerous times    ENDOSCOPY, COLON, DIAGNOSTIC      most recent 11-05-12    EYE SURGERY      bilateral cataracts    NASAL POLYP SURGERY      NOSE SURGERY      [place] COLON CA SCRN NOT  [address] IND N/A 11/29/2017    COLONOSCOPY performed by Yuly Fletcher MD at [address] ESOPHAGOGASTRODUODENOSCOPY TRANSORAL DIAGNOSTIC N/A 3/14/2017    EGD ESOPHAGOGASTRODUODENOSCOPY performed by Yuly Fletcher MD at [address] ESOPHAGOGASTRODUODENOSCOPY TRANSORAL DIAGNOSTIC N/A 11/29/2017    EGD ESOPHAGOGASTRODUODENOSCOPY performed by Yuly Fletcher MD at [address] ESOPHAGOGASTRODUODENOSCOPY TRANSORAL DIAGNOSTIC N/A 8/15/2018    EGD ESOPHAGOGASTRODUODENOSCOPY performed by Yuly Fletcher MD at [facility]  08-    Dr. Rivera Welsh  11/4/13    Varicies banding x4    UPPER GASTROINTESTINAL ENDOSCOPY  6/23/14    UPPER GASTROINTESTINAL ENDOSCOPY  10-6-14    Dr. Yanelis Singh ENDOSCOPY  1/20/15        UPPER GASTROINTESTINAL ENDOSCOPY  03/21/2016    -bx,portal HTN, varices    UPPER GASTROINTESTINAL ENDOSCOPY  09/19/2016    -esoph varices,hypertensive gastropathy    UPPER GASTROINTESTINAL ENDOSCOPY  03/14/2017    Dr Aida Cowan varices,gastric varices    UPPER GASTROINTESTINAL ENDOSCOPY  11/29/2017    -portal hypertension,esophageal varices    UPPER GASTROINTESTINAL ENDOSCOPY  08/15/2018    Dr. Michael Livers hypertension [facility] esophageal varices    UPPER GASTROINTESTINAL ENDOSCOPY  03/05/2019    Dr Jose Daniel Crews band ligation,portal hypertension with esophageal varices portal hypertensive gastropathy    UPPER GASTROINTESTINAL ENDOSCOPY N/A 3/5/2019    EGD BAND LIGATION performed by Yuly Fletcher MD at [address] [place] [address]  04/09/2019    per Dr. Rivera Welsh N/A 4/9/2019    Maritza-portal hypertension with esophageal varices & portal hypertensive gastropathy, variceal band ligation    UPPER GASTROINTESTINAL ENDOSCOPY N/A 6/12/2019    -portal hypertension with esophageal varices    UPPER GASTROINTESTINAL ENDOSCOPY N/A 10/15/2019    -portal hypertension,esophageal varices-banding    UPPER GASTROINTESTINAL ENDOSCOPY N/A 12/2/2019    -portal hypertensive gastropathy,distal esophageal scarring with grade 2 varices    UPPER GASTROINTESTINAL ENDOSCOPY N/A 3/16/2020    -scarring R/T banding,multiple columns grade 2 esopha varices,portal hypertensive gastropathy       Social History:    Social History     Tobacco Use    Smoking status: Never Smoker    Smokeless tobacco: Never Used   Substance Use Topics    Alcohol use: No                                Counseling given: Not Answered      Vital Signs (Current): There were no vitals filed for this visit.                                            BP Readings from Last 3 Encounters:   07/27/20 136/69   07/07/20 115/67   06/03/20 114/61       NPO Status:                                                                                 BMI:   Wt Readings from Last 3 Encounters:   07/07/20 176 lb (79.8 kg)   06/03/20 173 lb (78.5 kg)   03/16/20 190 lb (86.2 kg)     There is no height or weight on file to calculate BMI.    CBC:   Lab Results   Component Value Date    WBC 3.3 12/12/2017    RBC 4.37 12/12/2017    HGB 13.8 12/12/2017    HCT 41.5 12/12/2017    MCV 95.0 12/12/2017    RDW 14.5 12/12/2017    PLT 50 12/12/2017       CMP:   Lab Results   Component Value Date     06/27/2020    K 4.0 06/27/2020     06/27/2020    CO2 26 06/27/2020    BUN 18 06/27/2020    CREATININE 1.02 06/27/2020    GFRAA >60 06/27/2020    LABGLOM >60 06/27/2020    GLUCOSE 145 01/04/2019    GLUCOSE 88 02/08/2012    PROT 7.2 12/12/2017    CALCIUM 9.3 06/27/2020    BILITOT 1.1 01/04/2019    ALKPHOS 71 01/04/2019    AST 22 01/04/2019    ALT 20 01/04/2019       POC Tests:   Recent Labs     07/27/20  1505   POCGLU 101*       Coags: No results found for: PROTIME, INR, APTT    HCG (If Applicable): No results found for: PREGTESTUR, PREGSERUM, HCG, HCGQUANT     ABGs: No results found for: PHART, PO2ART, RFK7BOF, NIV0IOC, BEART, I6XJDJCJ     Type & Screen (If Applicable):  No results found for: LABABO, [address]    Anesthesia Evaluation  Patient summary reviewed and Nursing notes reviewed no history of anesthetic complications:   Airway: Mallampati: II  TM distance: >3 FB   Neck ROM: full  Comment: Beard noted  Mouth opening: > = 3 FB Dental:      Comment: Poor dentition    Pulmonary:Negative Pulmonary ROS and normal exam        (-) not a current smoker                          ROS comment: TB?  caregive uncertain   Cardiovascular:    (+) hypertension:, murmur, hyperlipidemia    Valvular problems/murmurs: murmur. ECG reviewed                        Neuro/Psych:   (+) seizures (per caregiver- long time since last seizure): well controlled,              ROS comment: Mental disability GI/Hepatic/Renal:   (+) hepatitis: A and B, liver disease: portal hypertension, esophageal varices,           Endo/Other:    (+) DiabetesType I DM, using insulin, . Abdominal:           Vascular: negative vascular ROS.  + PVD, aortic or cerebral, . Anesthesia Plan      general and TIVA     ASA 3       Induction: intravenous. Anesthetic plan and risks discussed with patient (caregiver).                       Vasquez Valentine, APRN - CRNA   7/27/2020

## 2020-07-27 NOTE — OP NOTE
PROCEDURE NOTE    DATE OF PROCEDURE: 7/27/2020     ENDOSCOPIST: Francesco Gonzalez. Kerri Ba MD, [facility]    ASSISTANT: None    PREOPERATIVE DIAGNOSIS: Portal hypertension with esophageal varices    POSTOPERATIVE DIAGNOSIS: -Erosive bulbar duodenitis, distal esophageal scarring from prior EVL, several columns of grade 2 esophageal varices    OPERATION: EGD with biopsy, EDMUND test    ANESTHESIA: MAC     ESTIMATED BLOOD LOSS:  Minimal    COMPLICATIONS: None. SPECIMENS: were obtained    HISTORY: The patient is a 63y.o. year old male with history of above preop diagnosis. Esophagogastroduodenoscopy with possible biopsy and dilation has been recommended. I explained the risk, benefits, expected outcome, and alternatives to the procedure. Risks include but are not limited to bleeding, infection, respiratory distress, hypotension, and perforation of the esophagus, stomach, or duodenum. Patient understands and is in agreement. PROCEDURE: The patient was given monitored anesthesia care. The patient was given oxygen by nasal cannula. The endoscope was inserted orally and advanced under direct vision through the esophagus, through the stomach, through the pylorus, and into the descending duodenum. Findings:  Duodenum:     Descending: normal    Bulb: abnormal: Solitary superficial erosion (see photo)    Stomach:    Antrum: normal, biopsied for CLOtest    Body: normal    Fundus: normal    Esophagus: abnormal: distal esophageal scarring from prior EVL, several columns of grade 2 esophageal varices    The scope was removed and the patient tolerated the procedure well.        Electronically signed by Yuly Fletcher MD  on 7/27/2020 at 3:58 PM       Esophageal erosion                                             Esophageal varices

## 2020-07-28 LAB
DIRECT EXAM: NEGATIVE
Lab: NORMAL
SPECIMEN DESCRIPTION: NORMAL

## 2020-07-31 NOTE — TELEPHONE ENCOUNTER
Your gastric biopsy was negative for H. Pylori. I would recommend that you start Prilosec 20 mg/d (or equivalent) to be taken 30-45 minutes before breakfast. You should follow up with your PCP and see me as needed.

## 2020-08-07 RX ORDER — OMEPRAZOLE 20 MG/1
20 CAPSULE, DELAYED RELEASE ORAL
Qty: 30 CAPSULE | Refills: 5 | Status: SHIPPED | OUTPATIENT
Start: 2020-08-07 | End: 2020-12-29 | Stop reason: SDUPTHER

## 2020-08-19 RX ORDER — CLOTRIMAZOLE AND BETAMETHASONE DIPROPIONATE 10; .64 MG/G; MG/G
CREAM TOPICAL
Qty: 1 TUBE | Refills: 0 | Status: SHIPPED | OUTPATIENT
Start: 2020-08-19 | End: 2022-03-07

## 2020-12-02 ENCOUNTER — OFFICE VISIT (OUTPATIENT)
Dept: ONCOLOGY | Age: 60
End: 2020-12-02
Payer: MEDICARE

## 2020-12-02 VITALS
TEMPERATURE: 96.6 F | DIASTOLIC BLOOD PRESSURE: 64 MMHG | HEIGHT: 77 IN | WEIGHT: 169 LBS | HEART RATE: 69 BPM | BODY MASS INDEX: 19.96 KG/M2 | SYSTOLIC BLOOD PRESSURE: 125 MMHG | RESPIRATION RATE: 18 BRPM

## 2020-12-02 PROCEDURE — 3017F COLORECTAL CA SCREEN DOC REV: CPT | Performed by: INTERNAL MEDICINE

## 2020-12-02 PROCEDURE — 99211 OFF/OP EST MAY X REQ PHY/QHP: CPT | Performed by: INTERNAL MEDICINE

## 2020-12-02 PROCEDURE — 99214 OFFICE O/P EST MOD 30 MIN: CPT | Performed by: INTERNAL MEDICINE

## 2020-12-02 PROCEDURE — 1036F TOBACCO NON-USER: CPT | Performed by: INTERNAL MEDICINE

## 2020-12-02 PROCEDURE — G8420 CALC BMI NORM PARAMETERS: HCPCS | Performed by: INTERNAL MEDICINE

## 2020-12-02 PROCEDURE — G8427 DOCREV CUR MEDS BY ELIG CLIN: HCPCS | Performed by: INTERNAL MEDICINE

## 2020-12-02 PROCEDURE — G8482 FLU IMMUNIZE ORDER/ADMIN: HCPCS | Performed by: INTERNAL MEDICINE

## 2020-12-02 RX ORDER — FERROUS GLUCONATE 324(37.5)
324 TABLET ORAL DAILY
Qty: 30 TABLET | Refills: 3 | Status: SHIPPED | OUTPATIENT
Start: 2020-12-02 | End: 2021-02-23 | Stop reason: SDUPTHER

## 2020-12-02 NOTE — PROGRESS NOTES
DIAGNOSIS:   1. Thrombocytopenia related to liver disease  2. Iron deficiency anemia, corrected with oral iron  CURRENT THERAPY:  Oral iron  Observation for thrombocytopenia  BRIEF CASE HISTORY:   Lanette Jacobs is a very pleasant 63 y.o. male who was by Dr. Emperatriz Christian due to anemia and thrombocytopenia. Work-up suggested cirrhosis and portal hypertension with esophageal varices. Patient was also found to have iron deficiency and was started on oral iron. INTERIM HISTORY:  The patient comes in today for a follow up, he feels well and has no complaints  He denies any bleeding or bruising. He stays in the group home. Overall condition has been unchanged. PAST MEDICAL HISTORY: has a past medical history of Cataracts, bilateral, Cirrhosis ([address] DM type 2 (diabetes mellitus, type 2) ([address] Esophageal varices without bleeding ([address] Essential hypertension, Hepatitis, Mental disability, Mixed hyperlipidemia, Portal hypertension ([address] and TB (tuberculosis). PAST SURGICAL HISTORY: has a past surgical history that includes eye surgery; Nasal polyp surgery; Upper gastrointestinal endoscopy (08-); Upper gastrointestinal endoscopy (11/4/13); Upper gastrointestinal endoscopy (6/23/14); Upper gastrointestinal endoscopy (10-6-14); Upper gastrointestinal endoscopy (1/20/15); Colonoscopy (03/21/2016); Upper gastrointestinal endoscopy (03/21/2016); Upper gastrointestinal endoscopy (09/19/2016); Upper gastrointestinal endoscopy (03/14/2017); pr esophagogastroduodenoscopy transoral diagnostic (N/A, 3/14/2017); Cataract removal (1977); Cholecystectomy (2007); Nose surgery; pr esophagogastroduodenoscopy transoral diagnostic (N/A, 11/29/2017); pr colon ca scrn not hi rsk ind (N/A, 11/29/2017); Upper gastrointestinal endoscopy (11/29/2017); Colonoscopy (11/29/2017); Upper gastrointestinal endoscopy (08/15/2018); pr esophagogastroduodenoscopy transoral diagnostic (N/A, 8/15/2018);  Upper gastrointestinal endoscopy (03/05/2019); Upper gastrointestinal endoscopy (N/A, 3/5/2019); Upper gastrointestinal endoscopy (04/09/2019); Upper gastrointestinal endoscopy (N/A, 4/9/2019); Upper gastrointestinal endoscopy (N/A, 6/12/2019); Upper gastrointestinal endoscopy (N/A, 10/15/2019); Endoscopy, colon, diagnostic; Endoscopy, colon, diagnostic; Upper gastrointestinal endoscopy (N/A, 12/2/2019); Upper gastrointestinal endoscopy (N/A, 3/16/2020); Upper gastrointestinal endoscopy (07/27/2020); and Upper gastrointestinal endoscopy (N/A, 7/27/2020). CURRENT MEDICATIONS:  has a current medication list which includes the following prescription(s): epinephrine, metformin, docusate sodium, glipizide, daily-rajeev, chlorhexidine, sodium chloride, clotrimazole-betamethasone, onetouch ultra, omeprazole, loratadine, lisinopril, simvastatin, metoprolol tartrate, bd ultra-fine pen needles, one touch ultrasoft lancets, insulin glargine, aluminum & magnesium hydroxide-simethicone, carbamide peroxide, acetaminophen, dextran 70-hypromellose, lamotrigine, terbinafine, dimethicone, hypromellose, miconazole, latex gloves one size, NONFORMULARY, polyvinyl alcohol-povidone, and menthol. ALLERGIES:  is allergic to bee venom. FAMILY HISTORY: Negative for any hematological or oncological conditions. SOCIAL HISTORY:  reports that he has never smoked. He has never used smokeless tobacco. He reports that he does not drink alcohol or use drugs. REVIEW OF SYSTEMS:  General: no fever or night sweats, Weight is stable. ENT: No double or blurred vision, no tinnitus or hearing problem, no dysphagia or sore throat   Respiratory: No chest pain, no shortness of breath, no cough or hemoptysis. Cardiovascular: Denies chest pain, PND or orthopnea. No L E swelling or palpitations. Gastrointestinal:    No nausea or vomiting, abdominal pain, diarrhea or constipation. Genitourinary: Denies dysuria, hematuria, frequency, urgency or incontinence.     Neurological: Denies headaches, decreased LOC, no sensory or motor focal deficits. Musculoskeletal:  No arthralgia no back pain or joint swelling. Skin: There are no rashes or bleeding. Itching in the groin area as discussed  Psychiatric:  No anxiety, no depression. Endocrine: no diabetes or thyroid disease. Hematologic: no bleeding , no adenopathy. PHYSICAL EXAM: Shows a well appearing 63y.o.-year-old male who is not in pain or distress. Vital Signs: Blood pressure 125/64, pulse 69, temperature 96.6 °F (35.9 °C), temperature source Temporal, resp. rate 18, height 6' 5\" (1.956 m), weight 169 lb (76.7 kg). HEENT: Normocephalic and atraumatic. Pupils are equal, round, reactive to light and accommodation. Extraocular muscles are intact. Neck: Showed no JVD, no carotid bruit . Lungs: Clear to auscultation bilaterally. Heart: Regular without any murmur. Abdomen: Soft, nontender, distended, I could feel the liver edge of the liver at the costal margin extremities: Lower extremities show no edema, clubbing, or cyanosis. Breasts: Examination not done today.  Neuro exam: intact cranial nerves bilaterally no motor or sensory deficit, gait is normal. Lymphatic: no adenopathy appreciated in the supraclavicular, axillary, cervical or inguinal area  Examination of the groin area showed tinea cruris  Review of radiological studies:     Review of laboratory data:    Iron and TIBC (11/28/2020 9:21 AM EST)  Iron and TIBC (11/28/2020 9:21 AM EST)   Component Value Ref Range Performed At Pathologist Signature   Iron 104 50 - 212 ug/dL [facility]     Tibc-calc only do not order 412 250 - 425 ug/dL [facility]     Iron Saturation 25 20 - 50 % 2026 [facility]       Iron and TIBC (11/28/2020 9:21 AM EST)   Specimen   Serum     Iron and TIBC (11/28/2020 9:21 AM EST)   Performing Organization Address City/State/ZIP Code Phone Number   [email or web address]            [facility] [address] [place], [address] [address]       Back to top of Lab Results       Ferritin (11/28/2020 9:21 AM EST)  Ferritin (11/28/2020 9:21 AM EST)   Component Value Ref Range Performed At Pathologist Signature   Ferritin 21 (L) 24 - 336 ng/mL [facility]       Ferritin (11/28/2020 9:21 AM EST)   Specimen   Serum     Ferritin (11/28/2020 9:21 AM EST)   Performing Organization Address City/State/ZIP Code Phone Number   [place]      [facility]   [address] [place], [address] [address]       Back to top of Lab Results       Comprehensive metabolic panel (80/02/4941 9:21 AM EST)  Comprehensive metabolic panel (27/34/1711 9:21 AM EST)   Component Value Ref Range Performed At Pathologist [facility]   Sodium 140 134 - 146 mmol/L [facility]     Potassium, Bld 3.9 3.5 - 5.0 mmol/L [facility]     Chloride 104 98 - 109 mmol/L [facility]     CO2 27 22 - 32 mmol/L [facility]     Anion gap 9 5 - 15 mmol/L [facility]     BUN 20 5 - 27 mg/dL [facility]     Creatinine 1.09Comment: METHOD TRACEABLE TO IDMS STANDARD 0.60 - 1.30 mg/dL [facility]     Glucose 190 (H) 65 - 99 mg/dL [facility]     Calcium 9.0 8.5 - 10.5 mg/dL [facility]     Total Protein 7.2 6.0 - 8.0 g/dL [facility]     Albumin 3.9 3.2 - 5.3 g/dL [facility]     Alkaline Phosphatase 96 39 - 130 U/L [facility]     AST 19 0 - 41 U/L [facility]     ALT 20 0 - 40 U/L [facility]     Total bilirubin 1.3 (H) 0.3 - 1.2 mg/dL [facility]     GFR MDRD Non Af Amer >60 >59 ml/min/1.73sq.m [facility]     GFR MDRD Af Amer >60 >59 ml/min/1.73sq.m [facility]       Comprehensive metabolic panel (55/57/2870 9:21 AM EST)   Specimen   Serum With concomitant iron deficiency  Plan:   The patient platelets and liver function are stable. We will continue current therapy without changes. We will start him back on iron as his ferritin is down. We will see him back in 6 months for follow-up.

## 2021-01-08 PROBLEM — I73.9 PVD (PERIPHERAL VASCULAR DISEASE) (HCC): Status: RESOLVED | Noted: 2018-09-18 | Resolved: 2021-01-08

## 2021-02-23 DIAGNOSIS — D50.9 IRON DEFICIENCY ANEMIA, UNSPECIFIED IRON DEFICIENCY ANEMIA TYPE: Primary | ICD-10-CM

## 2021-02-23 RX ORDER — FERROUS GLUCONATE 324(37.5)
324 TABLET ORAL DAILY
Qty: 30 TABLET | Refills: 5 | Status: SHIPPED | OUTPATIENT
Start: 2021-02-23 | End: 2021-08-20 | Stop reason: SDUPTHER

## 2021-03-30 ENCOUNTER — OFFICE VISIT (OUTPATIENT)
Dept: SURGERY | Age: 61
End: 2021-03-30
Payer: MEDICARE

## 2021-03-30 VITALS
TEMPERATURE: 98.3 F | WEIGHT: 195.5 LBS | BODY MASS INDEX: 22.62 KG/M2 | HEIGHT: 78 IN | DIASTOLIC BLOOD PRESSURE: 80 MMHG | HEART RATE: 71 BPM | SYSTOLIC BLOOD PRESSURE: 132 MMHG

## 2021-03-30 DIAGNOSIS — K76.6 PORTAL HYPERTENSION (HCC): ICD-10-CM

## 2021-03-30 DIAGNOSIS — K74.60 HEPATIC CIRRHOSIS, UNSPECIFIED HEPATIC CIRRHOSIS TYPE, UNSPECIFIED WHETHER ASCITES PRESENT (HCC): ICD-10-CM

## 2021-03-30 DIAGNOSIS — I85.00 ESOPHAGEAL VARICES WITHOUT BLEEDING, UNSPECIFIED ESOPHAGEAL VARICES TYPE (HCC): Primary | ICD-10-CM

## 2021-03-30 DIAGNOSIS — Z01.818 PRE-OP TESTING: ICD-10-CM

## 2021-03-30 DIAGNOSIS — Z86.010 HISTORY OF COLON POLYPS: ICD-10-CM

## 2021-03-30 PROCEDURE — 99203 OFFICE O/P NEW LOW 30 MIN: CPT | Performed by: SURGERY

## 2021-03-30 PROCEDURE — 1036F TOBACCO NON-USER: CPT | Performed by: SURGERY

## 2021-03-30 PROCEDURE — 3017F COLORECTAL CA SCREEN DOC REV: CPT | Performed by: SURGERY

## 2021-03-30 PROCEDURE — 99211 OFF/OP EST MAY X REQ PHY/QHP: CPT

## 2021-03-30 PROCEDURE — G8482 FLU IMMUNIZE ORDER/ADMIN: HCPCS | Performed by: SURGERY

## 2021-03-30 PROCEDURE — G8420 CALC BMI NORM PARAMETERS: HCPCS | Performed by: SURGERY

## 2021-03-30 PROCEDURE — G8427 DOCREV CUR MEDS BY ELIG CLIN: HCPCS | Performed by: SURGERY

## 2021-03-30 ASSESSMENT — ENCOUNTER SYMPTOMS
SHORTNESS OF BREATH: 0
NAUSEA: 0
SORE THROAT: 0
BLOOD IN STOOL: 0
CHOKING: 0
VOMITING: 0
BACK PAIN: 0
ABDOMINAL PAIN: 0
TROUBLE SWALLOWING: 0
COUGH: 0

## 2021-03-30 NOTE — PATIENT INSTRUCTIONS
Patient Education        Upper GI Endoscopy: Before Your Procedure  What is an upper GI endoscopy? An upper gastrointestinal (or GI) endoscopy is a test that allows your doctor to look at the inside of your esophagus, stomach, and the first part of your small intestine, called the duodenum. The esophagus is the tube that carries food to your stomach. The doctor uses a thin, lighted tube that bends. It is called an endoscope, or scope. The doctor puts the tip of the scope in your mouth and gently moves it down your throat. The scope is a flexible video camera. The doctor looks at a monitor (like a TV set or a computer screen) as he or she moves the scope. A doctor may do this procedure to look for ulcers, tumors, infection, or bleeding. It also can be used to look for signs of acid backing up into your esophagus. This is called gastroesophageal reflux disease, or GERD. The doctor can use the scope to take a sample of tissue for study (a biopsy). The doctor also can use the scope to take out growths or stop bleeding. Follow-up care is a key part of your treatment and safety. Be sure to make and go to all appointments, and call your doctor if you are having problems. It's also a good idea to know your test results and keep a list of the medicines you take. How do you prepare for the procedure? Procedures can be stressful. This information will help you understand what you can expect. And it will help you safely prepare for your procedure. Preparing for the procedure    · Do not eat or drink anything for 6 to 8 hours before the test. An empty stomach helps your doctor see your stomach clearly during the test. It also reduces your chances of vomiting. If you vomit, there is a small risk that the vomit could enter your lungs.  (This is called aspiration.) If the test is done in an emergency, a tube may be inserted through your nose or mouth to empty your stomach.     · Do not take sucralfate (Carafate) or antacids on the day of the test. These medicines can make it hard for your doctor to see your upper GI tract.     · If your doctor tells you to, stop taking iron supplements 7 to 14 days before the test.     · Be sure you have someone to take you home. Anesthesia and pain medicine will make it unsafe for you to drive or get home on your own.     · Understand exactly what procedure is planned, along with the risks, benefits, and other options. · Tell your doctor ALL the medicines, vitamins, supplements, and herbal remedies you take. Some may increase the risk of problems during your procedure. Your doctor will tell you if you should stop taking any of them before the procedure and how soon to do it.     · If you take aspirin or some other blood thinner, ask your doctor if you should stop taking it before your procedure. Make sure that you understand exactly what your doctor wants you to do. These medicines increase the risk of bleeding.     · Make sure your doctor and the hospital have a copy of your advance directive. If you don't have one, you may want to prepare one. It lets others know your health care wishes. It's a good thing to have before any type of surgery or procedure. What happens on the day of the procedure? · Follow the instructions exactly about when to stop eating and drinking. If you don't, your procedure may be canceled. If your doctor told you to take your medicines on the day of the procedure, take them with only a sip of water.     · Take a bath or shower before you come in for your procedure. Do not apply lotions, perfumes, deodorants, or nail polish.     · Take off all jewelry and piercings. And take out contact lenses, if you wear them. At the hospital or surgery center   · Bring a picture ID.     · The test may take 15 to 30 minutes.     · The doctor may spray medicine on the back of your throat to numb it.  You also will get medicine to prevent pain and to relax you.     · You will lie on your left side. The doctor will put the scope in your mouth and toward the back of your throat. The doctor will tell you when to swallow. This helps the scope move down your throat. You will be able to breathe normally. The doctor will move the scope down your esophagus into your stomach. The doctor also may look at the duodenum.     · If your doctor wants to take a sample of tissue for a biopsy, he or she may use small surgical tools, which are put into the scope, to cut off some tissue. You will not feel a biopsy, if one is taken. The doctor also can use the tools to stop bleeding or to do other treatments, if needed.     · You will stay at the hospital or surgery center for 1 to 2 hours until the medicine you were given wears off. What happens after an upper GI endoscopy? · After the test, you may belch and feel bloated for a while.     · You may have a tickling, dry throat or mouth. You may feel a bit hoarse, and you may have a mild sore throat. These symptoms may last several days. Throat lozenges and warm saltwater gargles can help relieve the throat symptoms.     · Don't drive or operate machinery for 12 hours after the test.     · Your doctor will tell you when you can go back to your usual diet and activities.     · Don't drink alcohol for 12 to 24 hours after the test.   When should you call your doctor? · You have questions or concerns.     · You don't understand how to prepare for your procedure.     · You become ill before the procedure (such as fever, flu, or a cold).     · You need to reschedule or have changed your mind about having the procedure. Where can you learn more? Go to [email or web address] and sign in to your Duda account. Enter P790 in the [employer] Kitchen box to learn more about \"Upper GI Endoscopy: Before Your Procedure. \"     If you do not have an account, please click on the \"Sign Up Now\" link.   Current as of: April 15, 2020               Content Version: 12.8  © 3206-3763 Healthwise, Incorporated. Care instructions adapted under license by [facility] ([facility]). If you have questions about a medical condition or this instruction, always ask your healthcare professional. [address] any warranty or liability for your use of this information.

## 2021-03-30 NOTE — LETTER
[facility] GENERAL SURGERY Part of [place] [address]  Phone: [phone]  Fax: [phone]    Anil Machado MD        March 30, 2021     Arlena Saint, MD  [place] [address], [address] [facility] [address]    Patient: Gordon Jose  MR Number: [id number]  YOB: 1960  Date of Visit: 3/30/2021    Dear Dr. Arlena Saint: Thank you for the request for consultation for Kentrell Mcguire to me for the evaluation of history of esophageal varices. Below are the relevant portions of my assessment and plan of care. ASSESSMENT     Diagnosis Orders   1. Esophageal varices without bleeding, unspecified esophageal varices type ([address]     2. Hepatic cirrhosis, unspecified hepatic cirrhosis type, unspecified whether ascites present ([address]     3. Portal hypertension ([address]     4. History of colon polyps     5. Pre-op testing  EKG 12 Lead       PLAN    We will proceed with interval EGD in the coming weeks. Risks, benefits, alternatives have been reviewed with Mr. Sp Hernandez and reviewed and accepted by Madison Torres, his guardian, including GI bleeding, perforation, missed lesions, COVID-19 exposure/infection, etc.  Next recommended colonoscopy interval is 2022, age 60. If you have questions, please do not hesitate to call me. I look forward to following Yehuda Pemberton along with you.     Sincerely,        Anil Machado MD

## 2021-03-30 NOTE — PROGRESS NOTES
Lyudmila Onofre MD  General Surgery, Endoscopy  Chief Medical Officer    [address] [address]  Dept: [phone]  Fax: [address]  Chief Complaint   Patient presents with    New Patient     EGD consult, esophageal varices. Last scope 7/20 Giorgio Frost MD. No known positive COVID, has recieved vaccine       HPI    Mr Elizabeth Caraballo is a very pleasant developmentally delayed 57-year-old white male [facility] with history of portal hypertension and esophageal varices, presenting for interval endoscopy. Most recent EGD July 27, 2020 by Dr. Giorgio Frost showing erosive bulbar duodenitis, distal esophageal scarring from prior EVL, and several columns of grade 2 esophageal varices. History of cirrhosis with hepatitis A and B. He has a history of colon polyps. Last colonoscopy November 2017 showing only internal hemorrhoids at that time. Recommended neck screening colonoscopy in 5 years, 2022, age 60. He has no significant GI complaints. Occasional difficulty swallowing of breads and meats, which is mostly rectified by taking smaller bites. No recent weight changes, 195 pounds, BMI 22. Normal daily bowel movements, formed and brown, without blood. He takes Prilosec daily. No known family history of colon cancer. He has never smoked. Review of Systems   Constitutional: Negative for activity change, appetite change, chills, fever and unexpected weight change. HENT: Negative for nosebleeds, sneezing, sore throat and trouble swallowing. Eyes: Negative for visual disturbance. Respiratory: Negative for cough, choking and shortness of breath. Cardiovascular: Negative for chest pain, palpitations and leg swelling. Gastrointestinal: Negative for abdominal pain, blood in stool, nausea and vomiting. Genitourinary: Negative for dysuria, flank pain and hematuria. Musculoskeletal: Positive for arthralgias.  Negative for back pain, gait problem and myalgias. Allergic/Immunologic: Negative for immunocompromised state. Neurological: Negative for dizziness, seizures, syncope, weakness and headaches. Hematological: Does not bruise/bleed easily. Psychiatric/Behavioral: Positive for decreased concentration. Negative for confusion and sleep disturbance.        Past Medical History:   Diagnosis Date    Cataracts, bilateral     [facility]     DM type 2 (diabetes mellitus, type 2) ([facility]     [facility]     Essential hypertension     Hepatitis     A and B    Mental disability     Mixed hyperlipidemia     Portal hypertension ([facility]     TB (tuberculosis)        Past Surgical History:   Procedure Laterality Date    CATARACT REMOVAL  1977    CHOLECYSTECTOMY  2007    COLONOSCOPY  03/21/2016    -polyps,diverticulosis,hemorrhoids    COLONOSCOPY  11/29/2017    -hemorrhoids    ENDOSCOPY, COLON, DIAGNOSTIC      EGD numerous times    ENDOSCOPY, COLON, DIAGNOSTIC      most recent 11-05-12    EYE SURGERY      bilateral cataracts    NASAL POLYP SURGERY      NOSE SURGERY      MI COLON CA SCRN NOT  [address] [place] [address] IND N/A 11/29/2017    COLONOSCOPY performed by David Cole MD at [address] ESOPHAGOGASTRODUODENOSCOPY TRANSORAL DIAGNOSTIC N/A 3/14/2017    EGD ESOPHAGOGASTRODUODENOSCOPY performed by David Cole MD at [address] ESOPHAGOGASTRODUODENOSCOPY TRANSORAL DIAGNOSTIC N/A 11/29/2017    EGD ESOPHAGOGASTRODUODENOSCOPY performed by David Cole MD at [address] ESOPHAGOGASTRODUODENOSCOPY TRANSORAL DIAGNOSTIC N/A 8/15/2018    EGD ESOPHAGOGASTRODUODENOSCOPY performed by David Cole MD at [facility]  08-    Dr. Tommie Caruso  11/4/13    Varicies banding x4    UPPER GASTROINTESTINAL ENDOSCOPY  6/23/14    UPPER GASTROINTESTINAL ENDOSCOPY  10-6-14    Dr. Britton Kunz ENDOSCOPY  1/20/15        UPPER GASTROINTESTINAL ENDOSCOPY  03/21/2016    -bx,portal HTN, varices    UPPER GASTROINTESTINAL ENDOSCOPY  09/19/2016    -esoph varices,hypertensive gastropathy    UPPER GASTROINTESTINAL ENDOSCOPY  03/14/2017    Dr Jacque Donisin varices,gastric varices    UPPER GASTROINTESTINAL ENDOSCOPY  11/29/2017    -portal hypertension,esophageal varices    UPPER GASTROINTESTINAL ENDOSCOPY  08/15/2018    Dr. Ng Chapin hypertension [facility] esophageal varices    UPPER GASTROINTESTINAL ENDOSCOPY  03/05/2019    Dr Earlene Donato band ligation,portal hypertension with esophageal varices portal hypertensive gastropathy    UPPER GASTROINTESTINAL ENDOSCOPY N/A 3/5/2019    EGD BAND LIGATION performed by Quincy Miller MD at [facility]  04/09/2019    per Dr. Mikael Montano N/A 4/9/2019    Maritza-portal hypertension with esophageal varices & portal hypertensive gastropathy, variceal band ligation    UPPER GASTROINTESTINAL ENDOSCOPY N/A 6/12/2019    -portal hypertension with esophageal varices    UPPER GASTROINTESTINAL ENDOSCOPY N/A 10/15/2019    -portal hypertension,esophageal varices-banding    UPPER GASTROINTESTINAL ENDOSCOPY N/A 12/2/2019    -portal hypertensive gastropathy,distal esophageal scarring with grade 2 varices    UPPER GASTROINTESTINAL ENDOSCOPY N/A 3/16/2020    -scarring R/T banding,multiple columns grade 2 esopha varices,portal hypertensive gastropathy    UPPER GASTROINTESTINAL ENDOSCOPY  07/27/2020    Dr Latina Gaucher H-Pylori)erosive bulbar duodenitis,distal esoph scarring from EVL,several columns grade 2 esophageal varices    UPPER GASTROINTESTINAL ENDOSCOPY N/A 7/27/2020    EGD BIOPSY performed by Quincy Miller MD at [address]       No family history on file.     Allergies:  See list    Current Outpatient Medications   Medication Sig Dispense Refill    simvastatin (ZOCOR) 20 MG tablet Take 1 tablet by mouth daily 31 tablet 5    loratadine (CLARITIN) 10 MG tablet Take 1 tablet by mouth daily 31 tablet 5    metoprolol tartrate (LOPRESSOR) 25 MG tablet Take 1 tablet by mouth daily 31 tablet 9    ferrous gluconate 324 (37.5 Fe) MG TABS Take 1 tablet by mouth daily 30 tablet 5    lisinopril (PRINIVIL;ZESTRIL) 2.5 MG tablet Take 1 tablet by mouth daily 90 tablet 3    omeprazole (PRILOSEC) 20 MG delayed release capsule Take 1 capsule by mouth every morning (before breakfast) 30 capsule 5    metFORMIN (GLUCOPHAGE-XR) 500 MG extended release tablet Take 2 tablets by mouth 2 times daily 360 tablet 2    docusate sodium (COLACE) 100 MG capsule Take 1 capsule by mouth 2 times daily 180 capsule 2    glipiZIDE (GLUCOTROL XL) 10 MG extended release tablet Take 1 tablet by mouth 2 times daily 180 tablet 2    Multiple Vitamin (DAILY-BEHZAD) TABS Take 1 tablet by mouth daily 90 tablet 2    chlorhexidine (PERIDEX) 0.12 % solution USE 15 ML SWISH AND SPIT ONCE DAILY *CALL PHARMACY FOR REFILLS* 1 Bottle 3    sodium chloride (SALINE MIST) 0.65 % nasal spray 2 sprays by Nasal route daily 30 mL 8    insulin glargine (BASAGLAR KWIKPEN) 100 UNIT/ML injection pen Inject 30 Units into the skin every morning 30 pen 3    carbamide peroxide (DEBROX) 6.5 % otic solution Place 4 drops into both ears 2 times daily Indications: Sunday and Thursday in the evening      dextran 70-hypromellose (GENTEAL TEARS) 0.1-0.3 % SOLN opthalmic solution Place 1 drop into both eyes 3 times daily      lamoTRIgine (LAMICTAL) 100 MG tablet Take 1 tablet by mouth daily 30 tablet 3    terbinafine (LAMISIL) 1 % cream Apply topically 2 times daily.  1 Tube 2    Hypromellose (GENTEAL MILD TO MODERATE OP) Place 1 drop into both eyes 2 times daily      ONETOUCH ULTRA strip USE AS DIRECTED 3 TIMES A  strip 1    EPINEPHrine (EPIPEN) 0.3 MG/0.3ML SOAJ injection Inject 1 pen intramuscularly as directed as needed for reaction to bee stings 2 each 1    clotrimazole-betamethasone (LOTRISONE) 1-0.05 % cream Apply topically 2 times daily. PRN 1 Tube 0    Insulin Pen Needle (BD ULTRA-FINE PEN NEEDLES) 29G X 12.7MM MISC 1 each by Does not apply route daily 100 each 8    ONE TOUCH ULTRASOFT LANCETS MISC TEST BS DAILY AT 8AM AND 2 HOURS POST SUPPER WEEKLY ON SATURDAY . DX. E11.9 200 each 3    aluminum & magnesium hydroxide-simethicone (MI-ACID) 200-200-20 MG/5ML SUSP suspension Take 30 mLs by mouth every 4 hours as needed for Indigestion 335 mL 3    acetaminophen (MAPAP) 325 MG tablet Take 2 tablets by mouth every 6 hours as needed for Pain 90 tablet 2    Dimethicone (AVEENO DAILY MOISTURIZING) 1.25 % LOTN APPLY TOPICALLY TO FEET ONCE WEEKLY 1 Bottle 9    miconazole (MICOTIN) 2 % powder Apply topically every morning Apply topically 2 times daily.  Disposable Gloves (LATEX GLOVES ONE SIZE) MISC Use as directed. Dispense 1 box 1 each 6    NONFORMULARY Sea salt nose spray 2 sprays each nostril daily      Polyvinyl Alcohol-Povidone (FRESHKOTE OP) Apply 1 drop to eye 2 times daily.  Throat Lozenges (HALLS COUGH DROPS MT) Take 1 lozenge by mouth every 4 hours as needed. No current facility-administered medications for this visit.         Social History     Socioeconomic History    Marital status: Single     Spouse name: Not on file    Number of children: Not on file    Years of education: Not on file    Highest education level: Not on file   Occupational History    Not on file   Social Needs    Financial resource strain: Not on file    Food insecurity     Worry: Not on file     Inability: Not on file    Transportation needs     Medical: Not on file     Non-medical: Not on file   Tobacco Use    Smoking status: Never Smoker    Smokeless tobacco: Never Used   Substance and Sexual Activity    Alcohol use: No    Drug use: No    Sexual activity: Not on file Lifestyle    Physical activity     Days per week: Not on file     Minutes per session: Not on file    Stress: Not on file   Relationships    Social connections     Talks on phone: Not on file     Gets together: Not on file     Attends Restorationist service: Not on file     Active member of club or organization: Not on file     Attends meetings of clubs or organizations: Not on file     Relationship status: Not on file    Intimate partner violence     Fear of current or ex partner: Not on file     Emotionally abused: Not on file     Physically abused: Not on file     Forced sexual activity: Not on file   Other Topics Concern    Not on file   Social History Narrative    Not on file       /80   Pulse 71   Temp 98.3 °F (36.8 °C)   Ht 6' 6.5\" (1.994 m)   Wt 195 lb 8 oz (88.7 kg)   BMI 22.31 kg/m²      Physical Exam  Vitals signs and nursing note reviewed. Constitutional:       Appearance: He is well-developed. HENT:      Head: Normocephalic and atraumatic. Eyes:      General: No scleral icterus. Conjunctiva/sclera: Conjunctivae normal.      Pupils: Pupils are equal, round, and reactive to light. Neck:      Musculoskeletal: Normal range of motion and neck supple. Vascular: No JVD. Trachea: No tracheal deviation. Cardiovascular:      Rate and Rhythm: Normal rate and regular rhythm. Pulmonary:      Effort: Pulmonary effort is normal. No respiratory distress. Chest:      Chest wall: No tenderness. Abdominal:      General: There is no distension. Palpations: Abdomen is soft. There is no mass. Tenderness: There is no abdominal tenderness. There is no guarding or rebound. Musculoskeletal: Normal range of motion. Lymphadenopathy:      Cervical: No cervical adenopathy. Skin:     General: Skin is warm and dry. Findings: No erythema or rash. Neurological:      Mental Status: He is alert. Mental status is at baseline. Cranial Nerves: No cranial nerve deficit. Psychiatric:         Mood and Affect: Mood normal.         Behavior: Behavior normal.         ASSESSMENT     Diagnosis Orders   1. Esophageal varices without bleeding, unspecified esophageal varices type ([facility]     2. Hepatic cirrhosis, unspecified hepatic cirrhosis type, unspecified whether ascites present ([facility]     3. Portal hypertension ([facility]     4. History of colon polyps     5. Pre-op testing  EKG 12 Lead       PLAN    We will proceed with interval EGD in the coming weeks. Risks, benefits, alternatives have been reviewed with Mr. Ina Tracey and reviewed and accepted by Crystal, his guardian, including GI bleeding, perforation, missed lesions, COVID-19 exposure/infection, etc.  Next recommended colonoscopy interval is 2022, age 60.      Alondra Hahn MD

## 2021-04-07 ENCOUNTER — HOSPITAL ENCOUNTER (OUTPATIENT)
Age: 61
Discharge: HOME | End: 2021-04-07
Payer: MEDICARE

## 2021-04-07 DIAGNOSIS — Z01.818 PRE-OP TESTING: ICD-10-CM

## 2021-04-07 LAB
EKG ATRIAL RATE: 88 BPM
EKG P AXIS: 64 DEGREES
EKG P-R INTERVAL: 136 MS
EKG Q-T INTERVAL: 402 MS
EKG QRS DURATION: 98 MS
EKG QTC CALCULATION (BAZETT): 486 MS
EKG R AXIS: 56 DEGREES
EKG T AXIS: 47 DEGREES
EKG VENTRICULAR RATE: 88 BPM

## 2021-04-07 PROCEDURE — 93005 ELECTROCARDIOGRAM TRACING: CPT

## 2021-04-07 PROCEDURE — 93010 ELECTROCARDIOGRAM REPORT: CPT | Performed by: INTERNAL MEDICINE

## 2021-04-13 ENCOUNTER — TELEPHONE (OUTPATIENT)
Dept: SURGERY | Age: 61
End: 2021-04-13

## 2021-04-13 NOTE — TELEPHONE ENCOUNTER
Contacted patient's POA, Advocacy and Protective Services, to obtain surgical consent for EGD scheduled with Dr Bella Vela 05/04/21 at [facility]. Left message requesting return call to [facility] surgery office.

## 2021-04-13 NOTE — TELEPHONE ENCOUNTER
Patient's Melrose Arie, returned call to office. Consent given for EGD on 05/04/21. Verified by two staff, myself and Maris Mckeon MA.

## 2021-04-28 ENCOUNTER — TELEPHONE (OUTPATIENT)
Dept: SURGERY | Age: 61
End: 2021-04-28

## 2021-04-28 RX ORDER — NAPROXEN 250 MG/1
250 TABLET ORAL PRN
COMMUNITY
End: 2021-10-11

## 2021-04-28 NOTE — TELEPHONE ENCOUNTER
SUZIEM with ABBY and Estefani Snider requesting return call as soon as possible regarding surgery date and verbal consent. Call back number provided.

## 2021-04-28 NOTE — PROGRESS NOTES
Patient's caregiver Helen Alcazar instructed on the pre-operative, intra-operative, and post-operative process. Patient's caregiver instructed on pt's NPO status. Medication instructions and operative instruction sheet reviewed over the phone with Helen Alcazar and copy faxed to caregiver. Instructed pt's caregiver to have the patient take metoprolol and Lamictal with a small sip of water only prior to arriving to the hospital the day of surgery.

## 2021-04-29 ENCOUNTER — HOSPITAL ENCOUNTER (OUTPATIENT)
Dept: PREADMISSION TESTING | Age: 61
Setting detail: SPECIMEN
Discharge: HOME | End: 2021-05-03
Payer: MEDICARE

## 2021-04-29 DIAGNOSIS — Z01.818 PREOPERATIVE TESTING: ICD-10-CM

## 2021-04-29 DIAGNOSIS — Z01.818 PREOPERATIVE TESTING: Primary | ICD-10-CM

## 2021-04-29 PROCEDURE — U0003 INFECTIOUS AGENT DETECTION BY NUCLEIC ACID (DNA OR RNA); SEVERE ACUTE RESPIRATORY SYNDROME CORONAVIRUS 2 (SARS-COV-2) (CORONAVIRUS DISEASE [COVID-19]), AMPLIFIED PROBE TECHNIQUE, MAKING USE OF HIGH THROUGHPUT TECHNOLOGIES AS DESCRIBED BY CMS-2020-01-R: HCPCS

## 2021-04-29 PROCEDURE — U0005 INFEC AGEN DETEC AMPLI PROBE: HCPCS

## 2021-04-29 PROCEDURE — C9803 HOPD COVID-19 SPEC COLLECT: HCPCS

## 2021-04-30 LAB
SARS-COV-2: NORMAL
SARS-COV-2: NOT DETECTED
SOURCE: NORMAL

## 2021-05-01 ENCOUNTER — TELEPHONE (OUTPATIENT)
Dept: PRIMARY CARE CLINIC | Age: 61
End: 2021-05-01

## 2021-05-02 NOTE — H&P
HPI     Mr Farida Escamilla is a very pleasant developmentally delayed 40-year-old white male resident of [address] [facility] homes with history of portal hypertension and esophageal varices, presenting for interval endoscopy. Most recent EGD July 27, 2020 by Dr. Rigo Cantu showing erosive bulbar duodenitis, distal esophageal scarring from prior EVL, and several columns of grade 2 esophageal varices. History of cirrhosis with hepatitis A and B. He has a history of colon polyps. Last colonoscopy November 2017 showing only internal hemorrhoids at that time. Recommended next screening colonoscopy in 5 years, 2022, age 60. He has no significant GI complaints. Occasional difficulty swallowing of breads and meats, which is mostly rectified by taking smaller bites. No recent weight changes, 195 pounds, BMI 22. Normal daily bowel movements, formed and brown, without blood. He takes Prilosec daily. No known family history of colon cancer. He has never smoked.     Review of Systems   Constitutional: Negative for activity change, appetite change, chills, fever and unexpected weight change. HENT: Negative for nosebleeds, sneezing, sore throat and trouble swallowing. Eyes: Negative for visual disturbance. Respiratory: Negative for cough, choking and shortness of breath. Cardiovascular: Negative for chest pain, palpitations and leg swelling. Gastrointestinal: Negative for abdominal pain, blood in stool, nausea and vomiting. Genitourinary: Negative for dysuria, flank pain and hematuria. Musculoskeletal: Positive for arthralgias. Negative for back pain, gait problem and myalgias. Allergic/Immunologic: Negative for immunocompromised state. Neurological: Negative for dizziness, seizures, syncope, weakness and headaches. Hematological: Does not bruise/bleed easily. Psychiatric/Behavioral: Positive for decreased concentration.  Negative for confusion and sleep disturbance.         Past Medical History        Past Medical History:   Diagnosis Date    Cataracts, bilateral      [facility] ([facility]      DM type 2 (diabetes mellitus, type 2) ([facility]      Esophageal varices without bleeding ([facility]      Essential hypertension      Hepatitis       A and B    Mental disability      Mixed hyperlipidemia      Portal hypertension ([facility]      TB (tuberculosis)              Past Surgical History   Past Surgical History:   Procedure Laterality Date    CATARACT REMOVAL   1977    CHOLECYSTECTOMY   2007    COLONOSCOPY   03/21/2016     -polyps,diverticulosis,hemorrhoids    COLONOSCOPY   11/29/2017     -hemorrhoids    ENDOSCOPY, COLON, DIAGNOSTIC         EGD numerous times    ENDOSCOPY, COLON, DIAGNOSTIC         most recent 11-05-12    EYE SURGERY         bilateral cataracts    NASAL POLYP SURGERY        NOSE SURGERY        IN COLON CA [facility]CRN NOT  [facility] N/A 11/29/2017     COLONOSCOPY performed by Kathe Miller MD at [address] [facility] TRANSORAL DIAGNOSTIC N/A 3/14/2017     EGD ESOPHAGOGASTRODUODENOSCOPY performed by Kathe Miller MD at [address] ESOPHAGOGASTRODUODENOSCOPY TRANSORAL DIAGNOSTIC N/A 11/29/2017     EGD ESOPHAGOGASTRODUODENOSCOPY performed by Kathe Miller MD at [address] ESOPHAGOGASTRODUODENOSCOPY TRANSORAL DIAGNOSTIC N/A 8/15/2018     EGD [facility]     Dr. Cr Paniagua   11/4/13     Varicies banding x4    UPPER GASTROINTESTINAL ENDOSCOPY   6/23/14    UPPER GASTROINTESTINAL ENDOSCOPY   10-6-14     Dr. Juan Dennis   1/20/15         UPPER GASTROINTESTINAL ENDOSCOPY   03/21/2016     -bx,portal HTN, varices    UPPER GASTROINTESTINAL ENDOSCOPY   09/19/2016     -esoph varices,hypertensive gastropathy    UPPER GASTROINTESTINAL ENDOSCOPY   03/14/2017      Rodolfo-esophageal varices,gastric varices    UPPER GASTROINTESTINAL ENDOSCOPY   11/29/2017     -portal hypertension,esophageal varices    UPPER GASTROINTESTINAL ENDOSCOPY   08/15/2018     Dr. Faby Mitchell hypertension [facility] esophageal varices    UPPER GASTROINTESTINAL ENDOSCOPY   03/05/2019     Dr Shelli Barahona band ligation,portal hypertension with esophageal varices portal hypertensive gastropathy    UPPER GASTROINTESTINAL ENDOSCOPY N/A 3/5/2019     EGD BAND LIGATION performed by Eric Monsalve MD at [address] [place] [address]   04/09/2019     per Dr. Bob Hsieh N/A 4/9/2019     Maritza-portal hypertension with esophageal varices & portal hypertensive gastropathy, variceal band ligation    UPPER GASTROINTESTINAL ENDOSCOPY N/A 6/12/2019     -portal hypertension with esophageal varices    UPPER GASTROINTESTINAL ENDOSCOPY N/A 10/15/2019     -portal hypertension,esophageal varices-banding    UPPER GASTROINTESTINAL ENDOSCOPY N/A 12/2/2019     -portal hypertensive gastropathy,distal esophageal scarring with grade 2 varices    UPPER GASTROINTESTINAL ENDOSCOPY N/A 3/16/2020     -scarring R/T banding,multiple columns grade 2 esopha varices,portal hypertensive gastropathy    UPPER GASTROINTESTINAL ENDOSCOPY   07/27/2020     Dr Jose Galvez H-Pylori)erosive bulbar duodenitis,distal esoph scarring from EVL,several columns grade 2 esophageal varices    UPPER GASTROINTESTINAL ENDOSCOPY N/A 7/27/2020     EGD BIOPSY performed by Eric Monsalve MD at [facility]. History   No family history on file.        Allergies:  See list     Current Facility-Administered Medications          Current Outpatient Medications   Medication Sig Dispense Refill    simvastatin (ZOCOR) 20 MG tablet Take 1 tablet by mouth daily 31 tablet 5    loratadine (CLARITIN) 10 MG tablet Take 1 tablet by mouth daily 31 tablet 5    metoprolol tartrate (LOPRESSOR) 25 MG tablet Take 1 tablet by mouth daily 31 tablet 9    ferrous gluconate 324 (37.5 Fe) MG TABS Take 1 tablet by mouth daily 30 tablet 5    lisinopril (PRINIVIL;ZESTRIL) 2.5 MG tablet Take 1 tablet by mouth daily 90 tablet 3    omeprazole (PRILOSEC) 20 MG delayed release capsule Take 1 capsule by mouth every morning (before breakfast) 30 capsule 5    metFORMIN (GLUCOPHAGE-XR) 500 MG extended release tablet Take 2 tablets by mouth 2 times daily 360 tablet 2    docusate sodium (COLACE) 100 MG capsule Take 1 capsule by mouth 2 times daily 180 capsule 2    glipiZIDE (GLUCOTROL XL) 10 MG extended release tablet Take 1 tablet by mouth 2 times daily 180 tablet 2    Multiple Vitamin (DAILY-BEHZAD) TABS Take 1 tablet by mouth daily 90 tablet 2    chlorhexidine (PERIDEX) 0.12 % solution USE 15 ML SWISH AND SPIT ONCE DAILY *CALL PHARMACY FOR REFILLS* 1 Bottle 3    sodium chloride (SALINE MIST) 0.65 % nasal spray 2 sprays by Nasal route daily 30 mL 8    insulin glargine (BASAGLAR KWIKPEN) 100 UNIT/ML injection pen Inject 30 Units into the skin every morning 30 pen 3    carbamide peroxide (DEBROX) 6.5 % otic solution Place 4 drops into both ears 2 times daily Indications: Sunday and Thursday in the evening        dextran 70-hypromellose (GENTEAL TEARS) 0.1-0.3 % SOLN opthalmic solution Place 1 drop into both eyes 3 times daily        lamoTRIgine (LAMICTAL) 100 MG tablet Take 1 tablet by mouth daily 30 tablet 3    terbinafine (LAMISIL) 1 % cream Apply topically 2 times daily. 1 Tube 2    Hypromellose (GENTEAL MILD TO MODERATE OP) Place 1 drop into both eyes 2 times daily        ONETOUCH ULTRA strip USE AS DIRECTED 3 TIMES A  strip 1    EPINEPHrine (EPIPEN) 0.3 MG/0.3ML SOAJ injection Inject 1 pen intramuscularly as directed as needed for reaction to bee stings 2 each 1    clotrimazole-betamethasone (LOTRISONE) 1-0.05 % cream Apply topically 2 times daily.    PRN 1 Tube 0    Insulin Pen Needle (BD ULTRA-FINE PEN NEEDLES) 29G X 12.7MM MISC 1 each by Does not apply route daily 100 each 8    ONE TOUCH ULTRASOFT LANCETS MISC TEST BS DAILY AT 8AM AND 2 HOURS POST SUPPER WEEKLY ON SATURDAY . DX. E11.9 200 each 3    aluminum & magnesium hydroxide-simethicone (MI-ACID) 200-200-20 MG/5ML SUSP suspension Take 30 mLs by mouth every 4 hours as needed for Indigestion 335 mL 3    acetaminophen (MAPAP) 325 MG tablet Take 2 tablets by mouth every 6 hours as needed for Pain 90 tablet 2    Dimethicone (AVEENO DAILY MOISTURIZING) 1.25 % LOTN APPLY TOPICALLY TO FEET ONCE WEEKLY 1 Bottle 9    miconazole (MICOTIN) 2 % powder Apply topically every morning Apply topically 2 times daily.        Disposable Gloves (LATEX GLOVES ONE SIZE) MISC Use as directed.   Dispense 1 box 1 each 6    NONFORMULARY Sea salt nose spray 2 sprays each nostril daily        Polyvinyl Alcohol-Povidone (FRESHKOTE OP) Apply 1 drop to eye 2 times daily.        Throat Lozenges (HALLS COUGH DROPS MT) Take 1 lozenge by mouth every 4 hours as needed.          No current facility-administered medications for this visit.             Social History   Social History            Socioeconomic History    Marital status: Single       Spouse name: Not on file    Number of children: Not on file    Years of education: Not on file    Highest education level: Not on file   Occupational History    Not on file   Social Needs    Financial resource strain: Not on file    Food insecurity       Worry: Not on file       Inability: Not on file    Transportation needs       Medical: Not on file       Non-medical: Not on file   Tobacco Use    Smoking status: Never Smoker    Smokeless tobacco: Never Used   Substance and Sexual Activity    Alcohol use: No    Drug use: No    Sexual activity: Not on file   Lifestyle    Physical activity       Days per week: Not on file       Minutes per session: Not on file    Stress: Not on file Relationships    Social connections       Talks on phone: Not on file       Gets together: Not on file       Attends [employer] service: Not on file       Active member of club or organization: Not on file       Attends meetings of clubs or organizations: Not on file       Relationship status: Not on file    Intimate partner violence       Fear of current or ex partner: Not on file       Emotionally abused: Not on file       Physically abused: Not on file       Forced sexual activity: Not on file   Other Topics Concern    Not on file   Social History Narrative    Not on file            /80   Pulse 71   Temp 98.3 °F (36.8 °C)   Ht 6' 6.5\" (1.994 m)   Wt 195 lb 8 oz (88.7 kg)   BMI 22.31 kg/m²       Physical Exam  Vitals signs and nursing note reviewed. Constitutional:       Appearance: He is well-developed. HENT:      Head: Normocephalic and atraumatic. Eyes:      General: No scleral icterus. Conjunctiva/sclera: Conjunctivae normal.      Pupils: Pupils are equal, round, and reactive to light. Neck:      Musculoskeletal: Normal range of motion and neck supple. Vascular: No JVD. Trachea: No tracheal deviation. Cardiovascular:      Rate and Rhythm: Normal rate and regular rhythm. Pulmonary:      Effort: Pulmonary effort is normal. No respiratory distress. Chest:      Chest wall: No tenderness. Abdominal:      General: There is no distension. Palpations: Abdomen is soft. There is no mass. Tenderness: There is no abdominal tenderness. There is no guarding or rebound. Musculoskeletal: Normal range of motion. Lymphadenopathy:      Cervical: No cervical adenopathy. Skin:     General: Skin is warm and dry. Findings: No erythema or rash. Neurological:      Mental Status: He is alert. Mental status is at baseline. Cranial Nerves: No cranial nerve deficit.    Psychiatric:         Mood and Affect: Mood normal.         Behavior: Behavior normal.          ASSESSMENT       Diagnosis Orders   1. Esophageal varices without bleeding, unspecified esophageal varices type ([address]      2. Hepatic cirrhosis, unspecified hepatic cirrhosis type, unspecified whether ascites present (HCC)      3. Portal hypertension (HCC)      4. History of colon polyps      5. Pre-op testing  EKG 12 Lead         PLAN     We will proceed with interval EGD in the coming weeks. Risks, benefits, alternatives have been reviewed with  Javier Jons and reviewed/accepted by Rachael Sifuentes, his guardian, including GI bleeding, perforation, missed lesions, COVID-19 exposure/infection, etc.  Next recommended colonoscopy interval is 2022, age 60. There have been no significant interval changes. We will proceed with endoscopy.      Natalie Christian MD

## 2021-05-04 ENCOUNTER — ANESTHESIA EVENT (OUTPATIENT)
Dept: OPERATING ROOM | Age: 61
End: 2021-05-04
Payer: MEDICARE

## 2021-05-05 ENCOUNTER — HOSPITAL ENCOUNTER (OUTPATIENT)
Age: 61
Setting detail: OUTPATIENT SURGERY
Discharge: HOME | End: 2021-05-05
Attending: SURGERY | Admitting: SURGERY
Payer: MEDICARE

## 2021-05-05 ENCOUNTER — ANESTHESIA (OUTPATIENT)
Dept: OPERATING ROOM | Age: 61
End: 2021-05-05
Payer: MEDICARE

## 2021-05-05 VITALS
RESPIRATION RATE: 16 BRPM | BODY MASS INDEX: 22.88 KG/M2 | DIASTOLIC BLOOD PRESSURE: 66 MMHG | TEMPERATURE: 97.7 F | HEART RATE: 68 BPM | OXYGEN SATURATION: 98 % | WEIGHT: 193.8 LBS | SYSTOLIC BLOOD PRESSURE: 111 MMHG | HEIGHT: 77 IN

## 2021-05-05 VITALS — DIASTOLIC BLOOD PRESSURE: 54 MMHG | OXYGEN SATURATION: 98 % | SYSTOLIC BLOOD PRESSURE: 108 MMHG

## 2021-05-05 PROCEDURE — 7100000011 HC PHASE II RECOVERY - ADDTL 15 MIN: Performed by: SURGERY

## 2021-05-05 PROCEDURE — 7100000010 HC PHASE II RECOVERY - FIRST 15 MIN: Performed by: SURGERY

## 2021-05-05 PROCEDURE — 2709999900 HC NON-CHARGEABLE SUPPLY: Performed by: SURGERY

## 2021-05-05 PROCEDURE — 6360000002 HC RX W HCPCS: Performed by: NURSE ANESTHETIST, CERTIFIED REGISTERED

## 2021-05-05 PROCEDURE — 2580000003 HC RX 258: Performed by: SURGERY

## 2021-05-05 PROCEDURE — 87077 CULTURE AEROBIC IDENTIFY: CPT

## 2021-05-05 PROCEDURE — 88305 TISSUE EXAM BY PATHOLOGIST: CPT

## 2021-05-05 PROCEDURE — 3609012400 HC EGD TRANSORAL BIOPSY SINGLE/MULTIPLE: Performed by: SURGERY

## 2021-05-05 PROCEDURE — 3700000000 HC ANESTHESIA ATTENDED CARE: Performed by: SURGERY

## 2021-05-05 PROCEDURE — 2500000003 HC RX 250 WO HCPCS: Performed by: NURSE ANESTHETIST, CERTIFIED REGISTERED

## 2021-05-05 RX ORDER — SODIUM CHLORIDE 0.9 % (FLUSH) 0.9 %
10 SYRINGE (ML) INJECTION PRN
Status: DISCONTINUED | OUTPATIENT
Start: 2021-05-05 | End: 2021-05-05 | Stop reason: HOSPADM

## 2021-05-05 RX ORDER — SODIUM CHLORIDE 9 MG/ML
25 INJECTION, SOLUTION INTRAVENOUS PRN
Status: DISCONTINUED | OUTPATIENT
Start: 2021-05-05 | End: 2021-05-05 | Stop reason: HOSPADM

## 2021-05-05 RX ORDER — SODIUM CHLORIDE 0.9 % (FLUSH) 0.9 %
10 SYRINGE (ML) INJECTION EVERY 12 HOURS SCHEDULED
Status: DISCONTINUED | OUTPATIENT
Start: 2021-05-05 | End: 2021-05-05 | Stop reason: HOSPADM

## 2021-05-05 RX ORDER — SODIUM CHLORIDE 0.9 % (FLUSH) 0.9 %
5-40 SYRINGE (ML) INJECTION EVERY 12 HOURS SCHEDULED
Status: DISCONTINUED | OUTPATIENT
Start: 2021-05-05 | End: 2021-05-05 | Stop reason: HOSPADM

## 2021-05-05 RX ORDER — PROPOFOL 10 MG/ML
INJECTION, EMULSION INTRAVENOUS PRN
Status: DISCONTINUED | OUTPATIENT
Start: 2021-05-05 | End: 2021-05-05 | Stop reason: SDUPTHER

## 2021-05-05 RX ORDER — LIDOCAINE HYDROCHLORIDE 20 MG/ML
INJECTION, SOLUTION EPIDURAL; INFILTRATION; INTRACAUDAL; PERINEURAL PRN
Status: DISCONTINUED | OUTPATIENT
Start: 2021-05-05 | End: 2021-05-05 | Stop reason: SDUPTHER

## 2021-05-05 RX ORDER — PROPOFOL 10 MG/ML
INJECTION, EMULSION INTRAVENOUS CONTINUOUS PRN
Status: DISCONTINUED | OUTPATIENT
Start: 2021-05-05 | End: 2021-05-05 | Stop reason: SDUPTHER

## 2021-05-05 RX ORDER — SODIUM CHLORIDE, SODIUM LACTATE, POTASSIUM CHLORIDE, CALCIUM CHLORIDE 600; 310; 30; 20 MG/100ML; MG/100ML; MG/100ML; MG/100ML
INJECTION, SOLUTION INTRAVENOUS CONTINUOUS
Status: DISCONTINUED | OUTPATIENT
Start: 2021-05-05 | End: 2021-05-05 | Stop reason: HOSPADM

## 2021-05-05 RX ORDER — SODIUM CHLORIDE 0.9 % (FLUSH) 0.9 %
5-40 SYRINGE (ML) INJECTION PRN
Status: DISCONTINUED | OUTPATIENT
Start: 2021-05-05 | End: 2021-05-05 | Stop reason: HOSPADM

## 2021-05-05 RX ADMIN — PROPOFOL 80 MG: 10 INJECTION, EMULSION INTRAVENOUS at 12:58

## 2021-05-05 RX ADMIN — SODIUM CHLORIDE, POTASSIUM CHLORIDE, SODIUM LACTATE AND CALCIUM CHLORIDE: 600; 310; 30; 20 INJECTION, SOLUTION INTRAVENOUS at 12:13

## 2021-05-05 RX ADMIN — LIDOCAINE HYDROCHLORIDE 100 MG: 20 INJECTION, SOLUTION EPIDURAL; INFILTRATION; INTRACAUDAL; PERINEURAL at 12:58

## 2021-05-05 RX ADMIN — PROPOFOL 180 MCG/KG/MIN: 10 INJECTION, EMULSION INTRAVENOUS at 12:58

## 2021-05-05 ASSESSMENT — PAIN SCALES - GENERAL: PAINLEVEL_OUTOF10: 0

## 2021-05-05 NOTE — ANESTHESIA PRE PROCEDURE
Department of Anesthesiology  Preprocedure Note       Name:  Joshua Redman   Age:  57 y.o.  :  1960                                          MRN:  [id number]         Date:  2021      Surgeon: Momo Mercer):  Mraiposa Segal MD    Procedure: Procedure(s):  EGD ESOPHAGOGASTRODUODENOSCOPY    Medications prior to admission:   Prior to Admission medications    Medication Sig Start Date End Date Taking?  Authorizing Provider   naproxen (NAPROSYN) 250 MG tablet Take 250 mg by mouth as needed for Pain   Yes Historical Provider, MD SUN ANTACID ANTI--066-54 MG/5ML SUSP suspension Take 30 mLs by mouth every 4 hours as needed for Indigestion (stomach acid or heartburn or gas) 21   Allan Vick MD   Insulin Pen Needle (BD ULTRA-FINE PEN NEEDLES) 29G X 12.7MM MISC 1 each by Does not apply route daily 21   Allan Vick MD   simvastatin (ZOCOR) 20 MG tablet Take 1 tablet by mouth daily 3/19/21   Allan Vick MD   loratadine (CLARITIN) 10 MG tablet Take 1 tablet by mouth daily 3/19/21   Allan Vick MD   metoprolol tartrate (LOPRESSOR) 25 MG tablet Take 1 tablet by mouth daily 3/5/21   Allan Vick MD   ferrous gluconate 324 (37.5 Fe) MG TABS Take 1 tablet by mouth daily 21   Roland Abraham MD   [facility] ULTRA strip USE AS DIRECTED 3 TIMES A DAY 21   Allan Vick MD   lisinopril (PRINIVIL;ZESTRIL) 2.5 MG tablet Take 1 tablet by mouth daily 21   Allan Vick MD   omeprazole (PRILOSEC) 20 MG delayed release capsule Take 1 capsule by mouth every morning (before breakfast) 20   Allan Vick MD   EPINEPHrine [facility]) 0.3 MG/0.3ML SOAJ injection Inject 1 pen intramuscularly as directed as needed for reaction to bee stings 20   Allan Vick MD   metFORMIN (GLUCOPHAGE-XR) 500 MG extended release tablet Take 2 tablets by mouth 2 times daily 10/2/20   Allan Vick MD   docusate sodium (COLACE) 100 MG capsule Take 1 capsule by mouth 2 times daily 9/28/20   Jonas Denis MD   glipiZIDE (GLUCOTROL XL) 10 MG extended release tablet Take 1 tablet by mouth 2 times daily 9/28/20   Jonas Denis MD   Multiple Vitamin (DAILY-BEHZAD) TABS Take 1 tablet by mouth daily 9/28/20   Jonas Denis MD   chlorhexidine (PERIDEX) 0.12 % solution USE 15 ML SWISH AND SPIT ONCE DAILY *CALL PHARMACY FOR REFILLS* 9/10/20   Jonas Denis MD   sodium chloride (SALINE MIST) 0.65 % nasal spray 2 sprays by Nasal route daily 8/25/20   Jonas Denis MD   clotrimazole-betamethasone (LOTRISONE) 1-0.05 % cream Apply topically 2 times daily. PRN 8/19/20   Paulie Wood MD   ONE TOUCH ULTRASOFT LANCETS MISC TEST BS DAILY AT 8AM AND 2 HOURS POST SUPPER WEEKLY ON SATURDAY . DX. E11.9 2/27/20   Jonas Denis MD   insulin glargine [facility]) 100 UNIT/ML injection pen Inject 30 Units into the skin every morning 1/14/20   Jonas Denis MD   carbamide peroxide [facility]) 6.5 % otic solution Place 4 drops into both ears 2 times daily Indications: Sunday and Thursday in the evening    Historical Provider, MD   acetaminophen (MAPAP) 325 MG tablet Take 2 tablets by mouth every 6 hours as needed for Pain 2/11/19   Jonas Denis MD   dextran 70-hypromellose (GENTEAL TEARS) 0.1-0.3 % SOLN opthalmic solution Place 1 drop into both eyes 3 times daily    Historical Provider, MD   lamoTRIgine (LAMICTAL) 100 MG tablet Take 1 tablet by mouth daily 8/1/18   Jonas Denis MD   Dimethicone (AVEENO DAILY MOISTURIZING) 1.25 % LOTN APPLY TOPICALLY TO FEET ONCE WEEKLY 6/28/17   Jonas Denis MD   Hypromellose (GENTEAL MILD TO MODERATE OP) Place 1 drop into both eyes 2 times daily    Historical Provider, MD   miconazole (MICOTIN) 2 % powder Apply topically every morning Apply topically 2 times daily. Historical Provider, MD   Disposable Gloves (LATEX GLOVES ONE SIZE) MISC Use as directed.   Dispense 1 box 1/27/15   Jonas Denis MD -polyps,diverticulosis,hemorrhoids    COLONOSCOPY  11/29/2017    -hemorrhoids    ENDOSCOPY, COLON, DIAGNOSTIC      EGD numerous times    ENDOSCOPY, COLON, DIAGNOSTIC      most recent 11-05-12    EYE SURGERY      bilateral cataracts    NASAL POLYP SURGERY      NOSE SURGERY      IN COLON CA SCRN NOT  [address] IND N/A 11/29/2017    COLONOSCOPY performed by Lenora Peter MD at [address] ESOPHAGOGASTRODUODENOSCOPY TRANSORAL DIAGNOSTIC N/A 3/14/2017    EGD ESOPHAGOGASTRODUODENOSCOPY performed by Lenora Peter MD at [address] ESOPHAGOGASTRODUODENOSCOPY TRANSORAL DIAGNOSTIC N/A 11/29/2017    EGD ESOPHAGOGASTRODUODENOSCOPY performed by Lenora Peter MD at [address] ESOPHAGOGASTRODUODENOSCOPY TRANSORAL DIAGNOSTIC N/A 8/15/2018    EGD ESOPHAGOGASTRODUODENOSCOPY performed by Lenora Peter MD at [address]  08-    Dr. Trae Lopez  11/4/13    Varicies banding x4    UPPER GASTROINTESTINAL ENDOSCOPY  6/23/14    UPPER GASTROINTESTINAL ENDOSCOPY  10-6-14    Dr. Ellington Overall ENDOSCOPY  1/20/15        UPPER GASTROINTESTINAL ENDOSCOPY  03/21/2016    -bx,portal HTN, varices    UPPER GASTROINTESTINAL ENDOSCOPY  09/19/2016    -esoph varices,hypertensive gastropathy    UPPER GASTROINTESTINAL ENDOSCOPY  03/14/2017    Dr Vazquez Medal varices,gastric varices    UPPER GASTROINTESTINAL ENDOSCOPY  11/29/2017    -portal hypertension,esophageal varices    UPPER GASTROINTESTINAL ENDOSCOPY  08/15/2018    Dr. Kirstin Sortot hypertension [facility] esophageal varices    UPPER GASTROINTESTINAL ENDOSCOPY  03/05/2019    Dr Isabel Rosenberg band ligation,portal hypertension with esophageal varices portal hypertensive gastropathy    UPPER GASTROINTESTINAL ENDOSCOPY N/A 3/5/2019    EGD BAND LIGATION performed by Lenora Peter MD at [place] [address] ENDOSCOPY  04/09/2019    per Dr. Nikole Fernandez N/A 4/9/2019    Maritza-portal hypertension with esophageal varices & portal hypertensive gastropathy, variceal band ligation    UPPER GASTROINTESTINAL ENDOSCOPY N/A 6/12/2019    -portal hypertension with esophageal varices    UPPER GASTROINTESTINAL ENDOSCOPY N/A 10/15/2019    -portal hypertension,esophageal varices-banding    UPPER GASTROINTESTINAL ENDOSCOPY N/A 12/2/2019    -portal hypertensive gastropathy,distal esophageal scarring with grade 2 varices    UPPER GASTROINTESTINAL ENDOSCOPY N/A 3/16/2020    -scarring R/T banding,multiple columns grade 2 esopha varices,portal hypertensive gastropathy    UPPER GASTROINTESTINAL ENDOSCOPY  07/27/2020    Dr Zachariah Coronado H-Pylori)erosive bulbar duodenitis,distal esoph scarring from EVL,several columns grade 2 esophageal varices    UPPER GASTROINTESTINAL ENDOSCOPY N/A 7/27/2020    EGD BIOPSY performed by Manasa Carmona MD at [address] [place] [address] History:    Social History     Tobacco Use    Smoking status: Never Smoker    Smokeless tobacco: Never Used   Substance Use Topics    Alcohol use:  No                                Counseling given: Not Answered      Vital Signs (Current):   Vitals:    04/28/21 1000 05/05/21 1148 05/05/21 1156   BP:   (!) 144/59   Pulse:   66   Resp:   16   Temp:   36.3 °C (97.4 °F)   TempSrc:   Temporal   SpO2:   98%   Weight: 201 lb (91.2 kg) 193 lb 12.8 oz (87.9 kg)    Height: 6' 5\" (1.956 m)  6' 5\" (1.956 m)                                              BP Readings from Last 3 Encounters:   05/05/21 (!) 144/59   03/30/21 132/80   01/08/21 120/70       NPO Status: Time of last liquid consumption: 2030                        Time of last solid consumption: 2030                        Date of last liquid consumption: 05/04/21                        Date of last solid food consumption: 05/04/21    BMI:   Wt Readings from Last 3 Encounters:   05/05/21 193 lb 12.8 oz (87.9 kg)   03/30/21 195 lb 8 oz (88.7 kg)   01/08/21 191 lb (86.6 kg)     Body mass index is 22.98 kg/m². CBC:   Lab Results   Component Value Date    WBC 3.3 12/12/2017    RBC 4.37 12/12/2017    HGB 13.8 12/12/2017    HCT 41.5 12/12/2017    MCV 95.0 12/12/2017    RDW 14.5 12/12/2017    PLT 50 12/12/2017       CMP:   Lab Results   Component Value Date     12/18/2020    K 4.3 12/18/2020     12/18/2020    CO2 30 12/18/2020    BUN 17 12/18/2020    CREATININE 1.03 12/18/2020    GFRAA >60 06/27/2020    LABGLOM >60 12/18/2020    LABGLOM >60 06/27/2020    GLUCOSE 299 12/18/2020    GLUCOSE 88 02/08/2012    PROT 7.2 12/12/2017    CALCIUM 9.5 12/18/2020    BILITOT 1.1 01/04/2019    ALKPHOS 71 01/04/2019    AST 22 01/04/2019    ALT 20 01/04/2019       POC Tests: No results for input(s): POCGLU, POCNA, POCK, POCCL, POCBUN, POCHEMO, POCHCT in the last 72 hours.     Coags: No results found for: PROTIME, INR, APTT    HCG (If Applicable): No results found for: PREGTESTUR, PREGSERUM, HCG, HCGQUANT     ABGs: No results found for: PHART, PO2ART, YHU3JNM, JYU0XJY, BEART, A9GAZUGS     Type & Screen (If Applicable):  No results found for: LABABO, LABRH    Drug/Infectious Status (If Applicable):  Lab Results   Component Value Date    HEPCAB NONREACTIVE 12/23/2014       COVID-19 Screening (If Applicable):   Lab Results   Component Value Date    COVID19 Not Detected 04/29/2021    COVID19 Not Detected 07/23/2020           Anesthesia Evaluation    Airway: Mallampati: II  TM distance: >3 FB   Neck ROM: full  Mouth opening: > = 3 FB Dental:          Pulmonary: breath sounds clear to auscultation                             Cardiovascular:    (+) hypertension:,         Rhythm: regular  Rate: normal                    Neuro/Psych:               GI/Hepatic/Renal:   (+) hepatitis: A and B, liver disease: portal hypertension, esophageal varices,           Endo/Other:    (+) DiabetesType II DM, using insulin, . Abdominal:           Vascular:                                        Anesthesia Plan      general     ASA 3       Induction: intravenous. Anesthetic plan and risks discussed with patient.       Plan discussed with surgical team.                  SUZANNE Davis - CRNA   5/5/2021

## 2021-05-06 LAB
DIRECT EXAM: NEGATIVE
Lab: NORMAL
SPECIMEN DESCRIPTION: NORMAL

## 2021-05-06 NOTE — OP NOTE
[address], [place] [address]                                OPERATIVE REPORT    PATIENT NAME: Estela Taveras                     :        1960  MED REC NO:   [id number]                              ROOM:  ACCOUNT NO:   [de-identified]                           ADMIT DATE: 2021  PROVIDER:     Mabel Garcia    DATE OF PROCEDURE:  2021    ENDOSCOPY REPORT    ATTENDING SURGEON:  Dr. Mabel Garcia. Primary care physician:  Mayito Patino. Checo Tijerina M.D. PREOPERATIVE DIAGNOSES:  1. Dysphagia. 2.  History of portal hypertension with esophageal varices. 3.  History of hepatitis A and B with cirrhosis. POSTOPERATIVE DIAGNOSES:  1. Several columns of grade 2 esophageal varices. 2.  Superficial antral gastritis. PROCEDURES PERFORMED:  1. Esophagogastroduodenoscopy. 2.  Prepyloric antral biopsies. ANESTHESIA:  MAC.    ESTIMATED BLOOD LOSS:  None. INDICATIONS:  The patient is a 48-year-old developmentally delayed white  male with a history of portal hypertension and esophageal varices. Most  recent EGD in 2020. He has a history of colon polyps. Last  colonoscopy in 2017 showing only internal hemorrhoids and was  recommended screening colonoscopy in two years, at age 60 in . He  has occasional dysphagia, taking Prilosec daily. No family history of  colon cancer. He has never smoked. At this time, endoscopy is  indicated. DESCRIPTION OF PROCEDURE:  After obtaining informed consent with  discussion of the risks, benefits, and alternatives with the patient's  guardian Gladilatonya Sink, including GI bleeding, perforation, missed lesions,  COVID-19 exposure/infection, etc., the patient was taken to the  endoscopy suite and placed in the left lateral recumbent position. Following adequate IV sedation, an endoscope was passed over the tongue  into the posterior pharynx.   Vocal folds were visualized and appeared  normal. The scope was directed into the esophagus and then onto the GE  junction. The upper esophagus appeared normal.  In the mid and lower  esophagus, there were several columns of grade 2 esophageal varices. These were hemostatic with no evidence of bleeding. In the lower  esophagus, the GE junction appeared normal.  There were no rings, no  webs, no varices, no strictures, and no lesions. The GE junction was  widely patent. The stomach was easily entered, had normal  distensibility. On retroflexion, the fundus and cardia appeared normal.  The body and prepyloric antrum had very mild superficial gastritis  without ulceration. Prepyloric antral biopsies were obtained. Pylorus  was patent. The duodenum was entered. First, second, and third  portions of the duodenum were normal.  The stomach was decompressed by  suction as the scope was removed. The patient tolerated the procedure  well and was transferred to PACU in stable condition. SPECIMENS:  Prepyloric antral biopsies. DRAINS:  None. COMPLICATIONS:  None. DISPOSITION:  To PACU awake, alert, and stable. Following recovery, we  will return the patient to his group home with gradual advancement of  diet and activity as tolerated with instructions for a healthy  high-fiber diet and continuation of proton-pump inhibition. Followup  with me by telephone with the patient's guardian to review pathology. His next recommended colonoscopy interval is in _____ years, at age 60  in 2022.         Clark Underwood    D: 05/05/2021 13:15:18       T: 05/05/2021 13:22:07     [email or web address]  Job#: [id number]     Doc#: [id number]    CC:  Cleve Benson

## 2021-05-07 LAB — SURGICAL PATHOLOGY REPORT: NORMAL

## 2021-05-28 ENCOUNTER — OFFICE VISIT (OUTPATIENT)
Dept: SURGERY | Age: 61
End: 2021-05-28
Payer: MEDICARE

## 2021-05-28 DIAGNOSIS — K76.6 PORTAL HYPERTENSION (HCC): ICD-10-CM

## 2021-05-28 DIAGNOSIS — K74.60 HEPATIC CIRRHOSIS, UNSPECIFIED HEPATIC CIRRHOSIS TYPE, UNSPECIFIED WHETHER ASCITES PRESENT (HCC): ICD-10-CM

## 2021-05-28 DIAGNOSIS — I85.00 ESOPHAGEAL VARICES WITHOUT BLEEDING, UNSPECIFIED ESOPHAGEAL VARICES TYPE (HCC): ICD-10-CM

## 2021-05-28 DIAGNOSIS — Z98.890 S/P ENDOSCOPY: Primary | ICD-10-CM

## 2021-05-28 DIAGNOSIS — K29.30 CHRONIC SUPERFICIAL GASTRITIS WITHOUT BLEEDING: ICD-10-CM

## 2021-05-28 PROCEDURE — 3017F COLORECTAL CA SCREEN DOC REV: CPT | Performed by: SURGERY

## 2021-05-28 PROCEDURE — G8420 CALC BMI NORM PARAMETERS: HCPCS | Performed by: SURGERY

## 2021-05-28 PROCEDURE — G8428 CUR MEDS NOT DOCUMENT: HCPCS | Performed by: SURGERY

## 2021-05-28 PROCEDURE — 99212 OFFICE O/P EST SF 10 MIN: CPT | Performed by: SURGERY

## 2021-05-28 PROCEDURE — 1036F TOBACCO NON-USER: CPT | Performed by: SURGERY

## 2021-05-30 ASSESSMENT — ENCOUNTER SYMPTOMS
BLOOD IN STOOL: 0
ABDOMINAL PAIN: 0
VOMITING: 0
SHORTNESS OF BREATH: 0
TROUBLE SWALLOWING: 0
CHOKING: 0
COUGH: 0
SORE THROAT: 0
BACK PAIN: 0
NAUSEA: 0

## 2021-05-31 NOTE — PROGRESS NOTES
Stefano Schwartz MD  General Surgery, Endoscopy  Chief Medical Officer    [facility] [place] [address] [address]  Dept: [phone]  Fax: [phone]    CHIEF COMPLAINT  No chief complaint on file. HPI    Follow up after endoscopy for Mr Rima Pham by telephone conversation with patient's medical power of , Roland Aden. DATE OF PROCEDURE:  05/05/2021     ENDOSCOPY REPORT     ATTENDING SURGEON:  Dr. Seth Scott.     Primary care physician:  Chay Avila. Izabel Mcbride M.D.  [facility] Shed:  1. Dysphagia. 2.  History of portal hypertension with esophageal varices. 3.  History of hepatitis A and B with cirrhosis.     POSTOPERATIVE DIAGNOSES:  1. Several columns of grade 2 esophageal varices. 2.  Superficial antral gastritis.     PROCEDURES PERFORMED:  1. Esophagogastroduodenoscopy. 2.  Prepyloric antral biopsies    He is reportedly doing well with no new complaints. Review of Systems   Constitutional: Negative for activity change, appetite change, chills, fever and unexpected weight change. HENT: Negative for nosebleeds, sneezing, sore throat and trouble swallowing. Eyes: Negative for visual disturbance. Respiratory: Negative for cough, choking and shortness of breath. Cardiovascular: Negative for chest pain, palpitations and leg swelling. Gastrointestinal: Negative for abdominal pain, blood in stool, nausea and vomiting. Genitourinary: Negative for dysuria, flank pain and hematuria. Musculoskeletal: Negative for back pain, gait problem and myalgias. Allergic/Immunologic: Negative for immunocompromised state. Neurological: Negative for dizziness, seizures, syncope, weakness and headaches. Hematological: Does not bruise/bleed easily. Psychiatric/Behavioral: Negative for sleep disturbance.        Past Medical History:   Diagnosis Date    Cataracts, bilateral     [facility]     DM type 2 (diabetes mellitus, type 2) ([facility]     [facility]     Essential hypertension     Hepatitis     A and B    Mental disability     Mixed hyperlipidemia     Portal hypertension ([facility]     TB (tuberculosis)        Past Surgical History:   Procedure Laterality Date    CATARACT REMOVAL  1977    CHOLECYSTECTOMY  2007    COLONOSCOPY  03/21/2016    -polyps,diverticulosis,hemorrhoids    COLONOSCOPY  11/29/2017    -hemorrhoids    ENDOSCOPY, COLON, DIAGNOSTIC      EGD numerous times    ENDOSCOPY, COLON, DIAGNOSTIC      most recent 11-05-12    EYE SURGERY      bilateral cataracts    NASAL POLYP SURGERY      NOSE SURGERY      OH COLON CA SCRN NOT  [address] IND N/A 11/29/2017    COLONOSCOPY performed by Amaury Ovalle MD at [address] ESOPHAGOGASTRODUODENOSCOPY TRANSORAL DIAGNOSTIC N/A 3/14/2017    EGD ESOPHAGOGASTRODUODENOSCOPY performed by Amaury Ovalle MD at [address] ESOPHAGOGASTRODUODENOSCOPY TRANSORAL DIAGNOSTIC N/A 11/29/2017    EGD ESOPHAGOGASTRODUODENOSCOPY performed by Amaury Ovalle MD at [address] ESOPHAGOGASTRODUODENOSCOPY TRANSORAL DIAGNOSTIC N/A 8/15/2018    EGD ESOPHAGOGASTRODUODENOSCOPY performed by Amaury Ovalle MD at [address] [facility]  08-    Dr. Cornell Salvador  11/4/13    Varicies banding x4    UPPER GASTROINTESTINAL ENDOSCOPY  6/23/14    UPPER GASTROINTESTINAL ENDOSCOPY  10-6-14    Dr. Viji Rosa ENDOSCOPY  1/20/15        UPPER GASTROINTESTINAL ENDOSCOPY  03/21/2016    -bx,portal HTN, varices    UPPER GASTROINTESTINAL ENDOSCOPY  09/19/2016    -esoph varices,hypertensive gastropathy    UPPER GASTROINTESTINAL ENDOSCOPY  03/14/2017    Dr Velia Blake varices,gastric varices    UPPER GASTROINTESTINAL ENDOSCOPY  11/29/2017    -portal hypertension,esophageal varices    UPPER GASTROINTESTINAL ENDOSCOPY  08/15/2018     Rodolfo-portal hypertension [facility] esophageal varices    UPPER GASTROINTESTINAL ENDOSCOPY  03/05/2019    Dr Amber Bridges band ligation,portal hypertension with esophageal varices portal hypertensive gastropathy    UPPER GASTROINTESTINAL ENDOSCOPY N/A 3/5/2019    EGD BAND LIGATION performed by Antonella Avila MD at [address] [facility]  04/09/2019    per Dr. Kayleigh Davis 4/9/2019    Maritza-portal hypertension with esophageal varices & portal hypertensive gastropathy, variceal band ligation    UPPER GASTROINTESTINAL ENDOSCOPY N/A 6/12/2019    -portal hypertension with esophageal varices    UPPER GASTROINTESTINAL ENDOSCOPY N/A 10/15/2019    -portal hypertension,esophageal varices-banding    UPPER GASTROINTESTINAL ENDOSCOPY N/A 12/2/2019    -portal hypertensive gastropathy,distal esophageal scarring with grade 2 varices    UPPER GASTROINTESTINAL ENDOSCOPY N/A 3/16/2020    -scarring R/T banding,multiple columns grade 2 esopha varices,portal hypertensive gastropathy    UPPER GASTROINTESTINAL ENDOSCOPY  07/27/2020    Dr Joanna Cook H-Pylori)erosive bulbar duodenitis,distal esoph scarring from EVL,several columns grade 2 esophageal varices    UPPER GASTROINTESTINAL ENDOSCOPY N/A 7/27/2020    EGD BIOPSY performed by Antonella Avila MD at [address] [facility]  05/05/2021    T/    UPPER GASTROINTESTINAL ENDOSCOPY N/A 5/5/2021    EGD BIOPSY performed by Yamilka Berkowitz MD at [address]       No family history on file.     Allergies:  See list    Current Outpatient Medications   Medication Sig Dispense Refill    chlorhexidine (PERIDEX) 0.12 % solution USE 15 ML SWISH AND SPIT ONCE DAILY 1 Bottle 3    SM ANTACID ANTI--200-20 MG/5ML SUSP suspension Take 30 mLs by mouth every 4 hours as needed for Indigestion (stomach acid or heartburn or gas) 335 mL 11    Insulin Pen Needle (BD ULTRA-FINE PEN NEEDLES) 29G X 12.7MM MISC 1 each by Does not apply route daily 100 each 8    naproxen (NAPROSYN) 250 MG tablet Take 250 mg by mouth as needed for Pain      simvastatin (ZOCOR) 20 MG tablet Take 1 tablet by mouth daily 31 tablet 5    loratadine (CLARITIN) 10 MG tablet Take 1 tablet by mouth daily 31 tablet 5    metoprolol tartrate (LOPRESSOR) 25 MG tablet Take 1 tablet by mouth daily 31 tablet 9    ferrous gluconate 324 (37.5 Fe) MG TABS Take 1 tablet by mouth daily 30 tablet 5    ONETOUCH ULTRA strip USE AS DIRECTED 3 TIMES A  strip 1    lisinopril (PRINIVIL;ZESTRIL) 2.5 MG tablet Take 1 tablet by mouth daily 90 tablet 3    omeprazole (PRILOSEC) 20 MG delayed release capsule Take 1 capsule by mouth every morning (before breakfast) 30 capsule 5    EPINEPHrine (EPIPEN) 0.3 MG/0.3ML SOAJ injection Inject 1 pen intramuscularly as directed as needed for reaction to bee stings 2 each 1    metFORMIN (GLUCOPHAGE-XR) 500 MG extended release tablet Take 2 tablets by mouth 2 times daily 360 tablet 2    docusate sodium (COLACE) 100 MG capsule Take 1 capsule by mouth 2 times daily 180 capsule 2    glipiZIDE (GLUCOTROL XL) 10 MG extended release tablet Take 1 tablet by mouth 2 times daily 180 tablet 2    Multiple Vitamin (DAILY-BEHZAD) TABS Take 1 tablet by mouth daily 90 tablet 2    sodium chloride (SALINE MIST) 0.65 % nasal spray 2 sprays by Nasal route daily 30 mL 8    clotrimazole-betamethasone (LOTRISONE) 1-0.05 % cream Apply topically 2 times daily. PRN 1 Tube 0    ONE TOUCH ULTRASOFT LANCETS MISC TEST BS DAILY AT 8AM AND 2 HOURS POST SUPPER WEEKLY ON SATURDAY .  DX. E11.9 200 each 3    insulin glargine (BASAGLAR KWIKPEN) 100 UNIT/ML injection pen Inject 30 Units into the skin every morning 30 pen 3    carbamide peroxide (DEBROX) 6.5 % otic solution Place 4 drops into both ears 2 times daily Indications: Sunday and Thursday in the evening      acetaminophen (MAPAP) 325 MG tablet Take 5/6/2021  Result Information    Status: Final result (Collected: 5/5/2021 11:32) Provider Status: Reviewed       ASSESSMENT     Diagnosis Orders   1. S/P endoscopy     2. Esophageal varices without bleeding, unspecified esophageal varices type ([facility]     3. Hepatic cirrhosis, unspecified hepatic cirrhosis type, unspecified whether ascites present ([facility] [address] [facility]     4. Portal hypertension ([facility] [address] [facility]     5. Chronic superficial gastritis without bleeding          PLAN    Endoscopic findings and pathology reviewed with Mr. Sherrie Jiménez guardian and/or medical power of , Seth Peters. Discussed importance of a healthy, balanced high-fiber low-fat diet. Continue proton pump inhibition. Next screening colonoscopy recommended November 2022, age 60 with EGD at that time.      Julian Jones MD

## 2021-08-04 ENCOUNTER — OFFICE VISIT (OUTPATIENT)
Dept: ONCOLOGY | Age: 61
End: 2021-08-04
Payer: MEDICARE

## 2021-08-04 VITALS
DIASTOLIC BLOOD PRESSURE: 65 MMHG | RESPIRATION RATE: 18 BRPM | SYSTOLIC BLOOD PRESSURE: 126 MMHG | HEART RATE: 73 BPM | TEMPERATURE: 97.6 F | HEIGHT: 77 IN | WEIGHT: 201 LBS | BODY MASS INDEX: 23.73 KG/M2

## 2021-08-04 DIAGNOSIS — K90.9 INTESTINAL MALABSORPTION, UNSPECIFIED TYPE: ICD-10-CM

## 2021-08-04 DIAGNOSIS — D50.9 IRON DEFICIENCY ANEMIA, UNSPECIFIED IRON DEFICIENCY ANEMIA TYPE: Primary | ICD-10-CM

## 2021-08-04 PROCEDURE — 1036F TOBACCO NON-USER: CPT | Performed by: INTERNAL MEDICINE

## 2021-08-04 PROCEDURE — G8420 CALC BMI NORM PARAMETERS: HCPCS | Performed by: INTERNAL MEDICINE

## 2021-08-04 PROCEDURE — 99211 OFF/OP EST MAY X REQ PHY/QHP: CPT

## 2021-08-04 PROCEDURE — G8427 DOCREV CUR MEDS BY ELIG CLIN: HCPCS | Performed by: INTERNAL MEDICINE

## 2021-08-04 PROCEDURE — 3017F COLORECTAL CA SCREEN DOC REV: CPT | Performed by: INTERNAL MEDICINE

## 2021-08-04 PROCEDURE — 99214 OFFICE O/P EST MOD 30 MIN: CPT | Performed by: INTERNAL MEDICINE

## 2021-08-08 NOTE — PROGRESS NOTES
Chief Complaint   Patient presents with    Follow-up     anemia thrombocytopenia       DIAGNOSIS:   1. Thrombocytopenia related to liver disease  2. Iron deficiency anemia, corrected with oral iron  CURRENT THERAPY:  Oral iron  Observation for thrombocytopenia  BRIEF CASE HISTORY:   Marilee Rizo is a very pleasant 57 y.o. male who was by Dr. Marisa Bennett due to anemia and thrombocytopenia. Work-up suggested cirrhosis and portal hypertension with esophageal varices. Patient was also found to have iron deficiency and was started on oral iron. INTERIM HISTORY:  The patient comes in today for a follow up, he feels well and has no complaints  He denies any bleeding or bruising. He stays in the group home. Overall condition has been unchanged. Labs will be repeated soon. PAST MEDICAL HISTORY: has a past medical history of Cataracts, bilateral, Cirrhosis ([address] DM type 2 (diabetes mellitus, type 2) ([address] Esophageal varices without bleeding ([address] Essential hypertension, Hepatitis, Mental disability, Mixed hyperlipidemia, Portal hypertension ([address] and TB (tuberculosis). PAST SURGICAL HISTORY: has a past surgical history that includes eye surgery; Nasal polyp surgery; Upper gastrointestinal endoscopy (08-); Upper gastrointestinal endoscopy (11/4/13); Upper gastrointestinal endoscopy (6/23/14); Upper gastrointestinal endoscopy (10-6-14); Upper gastrointestinal endoscopy (1/20/15); Colonoscopy (03/21/2016); Upper gastrointestinal endoscopy (03/21/2016); Upper gastrointestinal endoscopy (09/19/2016); Upper gastrointestinal endoscopy (03/14/2017); pr esophagogastroduodenoscopy transoral diagnostic (N/A, 3/14/2017); Cataract removal (1977); Cholecystectomy (2007); Nose surgery; pr esophagogastroduodenoscopy transoral diagnostic (N/A, 11/29/2017); pr colon ca scrn not hi rsk ind (N/A, 11/29/2017); Upper gastrointestinal endoscopy (11/29/2017); Colonoscopy (11/29/2017);  Upper gastrointestinal endoscopy (08/15/2018); pr esophagogastroduodenoscopy transoral diagnostic (N/A, 8/15/2018); Upper gastrointestinal endoscopy (03/05/2019); Upper gastrointestinal endoscopy (N/A, 3/5/2019); Upper gastrointestinal endoscopy (04/09/2019); Upper gastrointestinal endoscopy (N/A, 4/9/2019); Upper gastrointestinal endoscopy (N/A, 6/12/2019); Upper gastrointestinal endoscopy (N/A, 10/15/2019); Endoscopy, colon, diagnostic; Endoscopy, colon, diagnostic; Upper gastrointestinal endoscopy (N/A, 12/2/2019); Upper gastrointestinal endoscopy (N/A, 3/16/2020); Upper gastrointestinal endoscopy (07/27/2020); Upper gastrointestinal endoscopy (N/A, 7/27/2020); Upper gastrointestinal endoscopy (05/05/2021); and Upper gastrointestinal endoscopy (N/A, 5/5/2021). CURRENT MEDICATIONS:  has a current medication list which includes the following prescription(s): basaglar kwikpen, daily-rajeev multivitamin, glipizide, docusate sodium, metformin, omeprazole, chlorhexidine, sm antacid anti-gas, bd ultra-fine pen needles, naproxen, simvastatin, loratadine, metoprolol tartrate, ferrous gluconate, onetouch ultra, lisinopril, epinephrine, sodium chloride, clotrimazole-betamethasone, one touch ultrasoft lancets, carbamide peroxide, acetaminophen, dextran 70-hypromellose, lamotrigine, dimethicone, hypromellose, miconazole, latex gloves one size, NONFORMULARY, and menthol. ALLERGIES:  is allergic to bee venom. FAMILY HISTORY: Negative for any hematological or oncological conditions. SOCIAL HISTORY:  reports that he has never smoked. He has never used smokeless tobacco. He reports that he does not drink alcohol and does not use drugs. REVIEW OF SYSTEMS:  General: no fever or night sweats, Weight is stable. ENT: No double or blurred vision, no tinnitus or hearing problem, no dysphagia or sore throat   Respiratory: No chest pain, no shortness of breath, no cough or hemoptysis. Cardiovascular: Denies chest pain, PND or orthopnea.  No L E swelling or palpitations. Gastrointestinal:    No nausea or vomiting, abdominal pain, diarrhea or constipation. Genitourinary: Denies dysuria, hematuria, frequency, urgency or incontinence. Neurological: Denies headaches, decreased LOC, no sensory or motor focal deficits. Musculoskeletal:  No arthralgia no back pain or joint swelling. Skin: There are no rashes or bleeding. Itching in the groin area as discussed  Psychiatric:  No anxiety, no depression. Endocrine: no diabetes or thyroid disease. Hematologic: no bleeding , no adenopathy. PHYSICAL EXAM: Shows a well appearing 57y.o.-year-old male who is not in pain or distress. Vital Signs: Blood pressure 126/65, pulse 73, temperature 97.6 °F (36.4 °C), temperature source Temporal, resp. rate 18, height 6' 5\" (1.956 m), weight 201 lb (91.2 kg). HEENT: Normocephalic and atraumatic. Pupils are equal, round, reactive to light and accommodation. Extraocular muscles are intact. Neck: Showed no JVD, no carotid bruit . Lungs: Clear to auscultation bilaterally. Heart: Regular without any murmur. Abdomen: Soft, nontender, distended, I could feel the liver edge of the liver at the costal margin extremities: Lower extremities show no edema, clubbing, or cyanosis. Breasts: Examination not done today.  Neuro exam: intact cranial nerves bilaterally no motor or sensory deficit, gait is normal. Lymphatic: no adenopathy appreciated in the supraclavicular, axillary, cervical or inguinal area  Examination of the groin area showed tinea cruris  Review of radiological studies:     Review of laboratory data:    Iron and TIBC (11/28/2020 9:21 AM EST)  Iron and TIBC (11/28/2020 9:21 AM EST)   Component Value Ref Range Performed At Pathologist Signature   Iron 104 50 - 212 ug/dL [facility]     Tibc-calc only do not order 412 250 - 425 ug/dL [facility]     Iron Saturation 25 20 - 50 % 2026 [facility]       Iron and TIBC (11/28/2020 9:21 AM EST)   Specimen   Serum     Iron and TIBC (11/28/2020 9:21 AM EST)   Performing Organization Address City/State/ZIP Code Phone Number   [place]       [facility]   [address], [address] [address]       Back to top of Lab Results       Ferritin (11/28/2020 9:21 AM EST)  Ferritin (11/28/2020 9:21 AM EST)   Component Value Ref Range Performed At Pathologist Signature   Ferritin 21 (L) 24 - 336 ng/mL [facility]       Ferritin (11/28/2020 9:21 AM EST)   Specimen   Serum     Ferritin (11/28/2020 9:21 AM EST)   Performing Organization Address City/State/ZIP Code Phone Number   [place]      [facility]   [address], [address] [facility] [address]       Back to top of Lab Results       Comprehensive metabolic panel (05/50/1842 9:21 AM EST)  Comprehensive metabolic panel (30/66/7715 9:21 AM EST)   Component Value Ref Range Performed At Pathologist [facility]   Sodium 140 134 - 146 mmol/L [facility]     Potassium, Bld 3.9 3.5 - 5.0 mmol/L [facility]     Chloride 104 98 - 109 mmol/L [facility]     CO2 27 22 - 32 mmol/L [facility]     Anion gap 9 5 - 15 mmol/L [facility]     BUN 20 5 - 27 mg/dL [facility]     Creatinine 1.09Comment: METHOD TRACEABLE TO IDMS STANDARD 0.60 - 1.30 mg/dL [facility]     Glucose 190 (H) 65 - 99 mg/dL [facility]     Calcium 9.0 8.5 - 10.5 mg/dL [facility]     Total Protein 7.2 6.0 - 8.0 g/dL [facility]     Albumin 3.9 3.2 - 5.3 g/dL [facility]     Alkaline Phosphatase 96 39 - 130 U/L [facility]     AST 19 0 - 41 U/L [facility]     ALT 20 0 - 40 U/L [facility]     Total bilirubin 1.3 (H) 0.3 - 1.2 mg/dL [facility]     GFR MDRD Non Af Sandra Keith >60 >59 ml/min/1.73sq.m [facility]     GFR MDRD Af Amer >60 >59 ml/min/1.73sq.m [facility]       Comprehensive metabolic panel (86/53/4022 9:21 AM EST)   Specimen   Serum     Comprehensive metabolic panel (80/05/2584 9:21 AM EST)   Performing Organization Address City/State/ZIP Code Phone Number   CC[place]       [facility]   [address] [place], [place] [address]        Back to top of Lab Results       CBC auto differential (11/28/2020 9:21 AM EST)  CBC auto differential (11/28/2020 9:21 AM EST)   Component Value Ref Range Performed At Pathologist Signature   White Blood Cells 4.0 4.0 - 11.0 [address] [facility]     RBC count 3.77 (L) 4.10 - 5.70 X10E12/L [facility]     Hemoglobin 11.9 (L) 13.0 - 17.0 g/dL [facility]     Hematocrit 35.0 (L) 39 - 49 % [facility]     MCV 93 80 - 100 fL [facility]     MCH 31.6 27 - 34 pg [facility]     MCHC 34.0 32 - 36 g/dL [facility]     RDW 13.8 11.5 - 15.0 % [facility]     Platelets 68 (L) 745 - 7353 [facility]     MPV 9.5 7 - 12 fL [facility]     % neutrophils 64.5 % [facility]     % lymphocytes 20.6 % [facility]     % monocytes 12.7 % [facility]     % eosinophils 1.5 % [facility]     % Basophils 0.7 % [facility]     Neutrophils Absolute (A) 2.6 1.5 - 6.6 X10E9/L [facility]     Lymphocytes Absolute 0.8 (L) 1.0 - 3.5 [address] [address] LAB     Monocytes Absolute 0.5 0 - 0.9 [address] [address] LAB     Eosinophils Absolute 0.1 0.0 - 0.4 [address] [address] [facility]     Basophils Absolute 0.0 0.0 - 0.2 [address] [facility]       CBC auto differential (11/28/2020 9:21 AM EST)

## 2021-08-20 DIAGNOSIS — D50.9 IRON DEFICIENCY ANEMIA, UNSPECIFIED IRON DEFICIENCY ANEMIA TYPE: ICD-10-CM

## 2021-08-20 DIAGNOSIS — K90.9 INTESTINAL MALABSORPTION, UNSPECIFIED TYPE: Primary | ICD-10-CM

## 2021-08-20 RX ORDER — FERROUS GLUCONATE 324(37.5)
324 TABLET ORAL DAILY
Qty: 30 TABLET | Refills: 5 | Status: SHIPPED | OUTPATIENT
Start: 2021-08-20 | End: 2022-01-21 | Stop reason: SDUPTHER

## 2021-08-30 PROBLEM — E11.65 TYPE 2 DIABETES MELLITUS WITH HYPERGLYCEMIA (HCC): Status: ACTIVE | Noted: 2021-08-30

## 2021-09-07 ENCOUNTER — NURSE TRIAGE (OUTPATIENT)
Dept: OTHER | Facility: CLINIC | Age: 61
End: 2021-09-07

## 2021-09-07 NOTE — TELEPHONE ENCOUNTER
Received call from Brandon Guthrie at [facility] with [employer]. Brief description of triage: Below knee, between knee and ankle, in calf. Stomach hurts too. Still feels dizzy. The left one is much worse than the right. Triage indicates for patient to emergency/911    Care advice provided, patient verbalizes understanding; denies any other questions or concerns; instructed to call back for any new or worsening symptoms. Attention Provider: Thank you for allowing me to participate in the care of your patient. The patient was connected to triage in response to information provided to the [facility]. Please do not respond through this encounter as the response is not directed to a shared pool. Reason for Disposition   Sounds like a life-threatening emergency to the triager    Answer Assessment - Initial Assessment Questions  1. ONSET: \"When did the swelling start? \" (e.g., minutes, hours, days)      Yesterday he said he didn't feel well. 2. LOCATION: \"What part of the leg is swollen? \"  \"Are both legs swollen or just one leg? \"      Both legs are red and swollen but the left is much worse  3. SEVERITY: \"How bad is the swelling? \" (e.g., localized; mild, moderate, severe)   - Localized - small area of swelling localized to one leg   - MILD pedal edema - swelling limited to foot and ankle, pitting edema < 1/4 inch (6 mm) deep, rest and elevation eliminate most or all swelling   - MODERATE edema - swelling of lower leg to knee, pitting edema > 1/4 inch (6 mm) deep, rest and elevation only partially reduce swelling   - SEVERE edema - swelling extends above knee, facial or hand swelling present       Moderate  4. REDNESS: \"Does the swelling look red or infected? \"      Redress  5. PAIN: \"Is the swelling painful to touch? \" If so, ask: \"How painful is it? \"   (Scale 1-10; mild, moderate or severe)      He is crying  6. FEVER: \"Do you have a fever? \" If so, ask: \"What is it, how was it measured, and when did it start? \"       He feels warm  7. CAUSE: \"What do you think is causing the leg swelling? \"      He has had cellulitis before and is diabetic. 8. MEDICAL HISTORY: \"Do you have a history of heart failure, kidney disease, liver failure, or cancer? \"      Diabetic, MRDD,   9. RECURRENT SYMPTOM: \"Have you had leg swelling before? \" If so, ask: \"When was the last time? \" \"What happened that time? \"      Has had cellulitis in past. Is in a group home. 10. OTHER SYMPTOMS: \"Do you have any other symptoms? \" (e.g., chest pain, difficulty breathing)        Patient is ill and upset and crying in pain. 11. PREGNANCY: \"Is there any chance you are pregnant? \" \"When was your last menstrual period? \"        n/a    Protocols used: LEG SWELLING AND EDEMA-ADULT-[place]

## 2021-09-08 ENCOUNTER — HOSPITAL ENCOUNTER (INPATIENT)
Age: 61
LOS: 2 days | Discharge: HOME | DRG: 603 | End: 2021-09-10
Attending: EMERGENCY MEDICINE | Admitting: FAMILY MEDICINE
Payer: MEDICARE

## 2021-09-08 DIAGNOSIS — L03.116 CELLULITIS OF LEFT LEG: Primary | ICD-10-CM

## 2021-09-08 LAB
ABSOLUTE EOS #: 0.03 K/UL (ref 0–0.44)
ABSOLUTE IMMATURE GRANULOCYTE: 0 K/UL (ref 0–0.3)
ABSOLUTE LYMPH #: 0.6 K/UL (ref 1.1–3.7)
ABSOLUTE MONO #: 0.33 K/UL (ref 0.1–1.2)
ANION GAP SERPL CALCULATED.3IONS-SCNC: 12 MMOL/L (ref 9–17)
BASOPHILS # BLD: 0 % (ref 0–2)
BASOPHILS ABSOLUTE: 0 K/UL (ref 0–0.2)
BUN BLDV-MCNC: 25 MG/DL (ref 8–23)
BUN/CREAT BLD: 20 (ref 9–20)
CALCIUM SERPL-MCNC: 8.7 MG/DL (ref 8.6–10.4)
CHLORIDE BLD-SCNC: 103 MMOL/L (ref 98–107)
CO2: 21 MMOL/L (ref 20–31)
CREAT SERPL-MCNC: 1.27 MG/DL (ref 0.7–1.2)
DIFFERENTIAL TYPE: ABNORMAL
EOSINOPHILS RELATIVE PERCENT: 1 % (ref 1–4)
GFR AFRICAN AMERICAN: >60 ML/MIN
GFR NON-AFRICAN AMERICAN: 57 ML/MIN
GFR SERPL CREATININE-BSD FRML MDRD: ABNORMAL ML/MIN/{1.73_M2}
GFR SERPL CREATININE-BSD FRML MDRD: ABNORMAL ML/MIN/{1.73_M2}
GLUCOSE BLD-MCNC: 196 MG/DL (ref 70–99)
GLUCOSE BLD-MCNC: 210 MG/DL (ref 74–100)
HCT VFR BLD CALC: 28.8 % (ref 40.7–50.3)
HEMOGLOBIN: 9 G/DL (ref 13–17)
IMMATURE GRANULOCYTES: 0 %
INR BLD: 1.3
LYMPHOCYTES # BLD: 20 % (ref 24–43)
MAGNESIUM: 1.8 MG/DL (ref 1.6–2.6)
MCH RBC QN AUTO: 28 PG (ref 25.2–33.5)
MCHC RBC AUTO-ENTMCNC: 31.3 G/DL (ref 28.4–34.8)
MCV RBC AUTO: 89.4 FL (ref 82.6–102.9)
MONOCYTES # BLD: 11 % (ref 3–12)
MORPHOLOGY: ABNORMAL
NRBC AUTOMATED: 0 PER 100 WBC
PARTIAL THROMBOPLASTIN TIME: 39.9 SEC (ref 23.9–33.8)
PDW BLD-RTO: 13.4 % (ref 11.8–14.4)
PLATELET # BLD: ABNORMAL K/UL (ref 138–453)
PLATELET ESTIMATE: ABNORMAL
PLATELET, FLUORESCENCE: 44 K/UL (ref 138–453)
PLATELET, IMMATURE FRACTION: 5 % (ref 1.1–10.3)
PMV BLD AUTO: ABNORMAL FL (ref 8.1–13.5)
POTASSIUM SERPL-SCNC: 3.5 MMOL/L (ref 3.7–5.3)
PROTHROMBIN TIME: 16 SEC (ref 11.5–14.2)
RBC # BLD: 3.22 M/UL (ref 4.21–5.77)
RBC # BLD: ABNORMAL 10*6/UL
SEG NEUTROPHILS: 68 % (ref 36–65)
SEGMENTED NEUTROPHILS ABSOLUTE COUNT: 2.04 K/UL (ref 1.5–8.1)
SODIUM BLD-SCNC: 136 MMOL/L (ref 135–144)
WBC # BLD: 3 K/UL (ref 3.5–11.3)
WBC # BLD: ABNORMAL 10*3/UL

## 2021-09-08 PROCEDURE — 6360000002 HC RX W HCPCS: Performed by: EMERGENCY MEDICINE

## 2021-09-08 PROCEDURE — 6370000000 HC RX 637 (ALT 250 FOR IP): Performed by: FAMILY MEDICINE

## 2021-09-08 PROCEDURE — 36415 COLL VENOUS BLD VENIPUNCTURE: CPT

## 2021-09-08 PROCEDURE — 1200000000 HC SEMI PRIVATE

## 2021-09-08 PROCEDURE — 85610 PROTHROMBIN TIME: CPT

## 2021-09-08 PROCEDURE — 87040 BLOOD CULTURE FOR BACTERIA: CPT

## 2021-09-08 PROCEDURE — 2580000003 HC RX 258: Performed by: FAMILY MEDICINE

## 2021-09-08 PROCEDURE — 96365 THER/PROPH/DIAG IV INF INIT: CPT

## 2021-09-08 PROCEDURE — 2580000003 HC RX 258

## 2021-09-08 PROCEDURE — 83036 HEMOGLOBIN GLYCOSYLATED A1C: CPT

## 2021-09-08 PROCEDURE — 85055 RETICULATED PLATELET ASSAY: CPT

## 2021-09-08 PROCEDURE — 85025 COMPLETE CBC W/AUTO DIFF WBC: CPT

## 2021-09-08 PROCEDURE — 82947 ASSAY GLUCOSE BLOOD QUANT: CPT

## 2021-09-08 PROCEDURE — 85730 THROMBOPLASTIN TIME PARTIAL: CPT

## 2021-09-08 PROCEDURE — 96366 THER/PROPH/DIAG IV INF ADDON: CPT

## 2021-09-08 PROCEDURE — 99284 EMERGENCY DEPT VISIT MOD MDM: CPT

## 2021-09-08 PROCEDURE — 80048 BASIC METABOLIC PNL TOTAL CA: CPT

## 2021-09-08 PROCEDURE — 83735 ASSAY OF MAGNESIUM: CPT

## 2021-09-08 PROCEDURE — 2580000003 HC RX 258: Performed by: EMERGENCY MEDICINE

## 2021-09-08 RX ORDER — ACETAMINOPHEN 650 MG/1
650 SUPPOSITORY RECTAL EVERY 6 HOURS PRN
Status: DISCONTINUED | OUTPATIENT
Start: 2021-09-08 | End: 2021-09-10 | Stop reason: HOSPADM

## 2021-09-08 RX ORDER — SODIUM CHLORIDE 0.9 % (FLUSH) 0.9 %
5-40 SYRINGE (ML) INJECTION EVERY 12 HOURS SCHEDULED
Status: DISCONTINUED | OUTPATIENT
Start: 2021-09-08 | End: 2021-09-10 | Stop reason: HOSPADM

## 2021-09-08 RX ORDER — POLYETHYLENE GLYCOL 3350 17 G/17G
17 POWDER, FOR SOLUTION ORAL DAILY PRN
Status: DISCONTINUED | OUTPATIENT
Start: 2021-09-08 | End: 2021-09-10 | Stop reason: HOSPADM

## 2021-09-08 RX ORDER — SODIUM CHLORIDE 9 MG/ML
INJECTION, SOLUTION INTRAVENOUS CONTINUOUS
Status: DISCONTINUED | OUTPATIENT
Start: 2021-09-08 | End: 2021-09-10 | Stop reason: HOSPADM

## 2021-09-08 RX ORDER — SODIUM CHLORIDE 0.9 % (FLUSH) 0.9 %
5-40 SYRINGE (ML) INJECTION PRN
Status: DISCONTINUED | OUTPATIENT
Start: 2021-09-08 | End: 2021-09-10 | Stop reason: HOSPADM

## 2021-09-08 RX ORDER — DEXTROSE MONOHYDRATE 50 MG/ML
100 INJECTION, SOLUTION INTRAVENOUS PRN
Status: DISCONTINUED | OUTPATIENT
Start: 2021-09-08 | End: 2021-09-10 | Stop reason: HOSPADM

## 2021-09-08 RX ORDER — SODIUM CHLORIDE 9 MG/ML
25 INJECTION, SOLUTION INTRAVENOUS PRN
Status: DISCONTINUED | OUTPATIENT
Start: 2021-09-08 | End: 2021-09-10 | Stop reason: HOSPADM

## 2021-09-08 RX ORDER — NICOTINE POLACRILEX 4 MG
15 LOZENGE BUCCAL PRN
Status: DISCONTINUED | OUTPATIENT
Start: 2021-09-08 | End: 2021-09-10 | Stop reason: HOSPADM

## 2021-09-08 RX ORDER — DEXTROSE MONOHYDRATE 25 G/50ML
12.5 INJECTION, SOLUTION INTRAVENOUS PRN
Status: DISCONTINUED | OUTPATIENT
Start: 2021-09-08 | End: 2021-09-10 | Stop reason: HOSPADM

## 2021-09-08 RX ORDER — ONDANSETRON 4 MG/1
4 TABLET, ORALLY DISINTEGRATING ORAL EVERY 8 HOURS PRN
Status: DISCONTINUED | OUTPATIENT
Start: 2021-09-08 | End: 2021-09-10 | Stop reason: HOSPADM

## 2021-09-08 RX ORDER — ONDANSETRON 2 MG/ML
4 INJECTION INTRAMUSCULAR; INTRAVENOUS EVERY 6 HOURS PRN
Status: DISCONTINUED | OUTPATIENT
Start: 2021-09-08 | End: 2021-09-10 | Stop reason: HOSPADM

## 2021-09-08 RX ORDER — ACETAMINOPHEN 325 MG/1
650 TABLET ORAL EVERY 6 HOURS PRN
Status: DISCONTINUED | OUTPATIENT
Start: 2021-09-08 | End: 2021-09-10 | Stop reason: HOSPADM

## 2021-09-08 RX ADMIN — SODIUM CHLORIDE: 9 INJECTION, SOLUTION INTRAVENOUS at 23:39

## 2021-09-08 RX ADMIN — VANCOMYCIN HYDROCHLORIDE 1250 MG: 1 INJECTION, POWDER, LYOPHILIZED, FOR SOLUTION INTRAVENOUS at 21:09

## 2021-09-08 RX ADMIN — Medication 10 ML: at 23:42

## 2021-09-08 RX ADMIN — INSULIN LISPRO 2 UNITS: 100 INJECTION, SOLUTION INTRAVENOUS; SUBCUTANEOUS at 23:42

## 2021-09-08 RX ADMIN — WATER 40 ML: 1 INJECTION INTRAMUSCULAR; INTRAVENOUS; SUBCUTANEOUS at 21:13

## 2021-09-08 RX ADMIN — WATER: 1 INJECTION INTRAMUSCULAR; INTRAVENOUS; SUBCUTANEOUS at 21:14

## 2021-09-08 ASSESSMENT — PAIN DESCRIPTION - LOCATION
LOCATION: LEG
LOCATION: LEG

## 2021-09-08 ASSESSMENT — PAIN DESCRIPTION - ORIENTATION
ORIENTATION: LEFT
ORIENTATION: LEFT

## 2021-09-08 ASSESSMENT — PAIN DESCRIPTION - PAIN TYPE
TYPE: ACUTE PAIN
TYPE: ACUTE PAIN

## 2021-09-08 ASSESSMENT — PAIN SCALES - GENERAL
PAINLEVEL_OUTOF10: 8
PAINLEVEL_OUTOF10: 1

## 2021-09-09 PROBLEM — E44.1 MILD MALNUTRITION (HCC): Status: ACTIVE | Noted: 2021-09-09

## 2021-09-09 LAB
ALBUMIN SERPL-MCNC: 3.1 G/DL (ref 3.5–5.2)
ALBUMIN/GLOBULIN RATIO: 1.1 (ref 1–2.5)
ALP BLD-CCNC: 77 U/L (ref 40–129)
ALT SERPL-CCNC: 22 U/L (ref 5–41)
ANION GAP SERPL CALCULATED.3IONS-SCNC: 9 MMOL/L (ref 9–17)
AST SERPL-CCNC: 40 U/L
BILIRUB SERPL-MCNC: 0.64 MG/DL (ref 0.3–1.2)
BUN BLDV-MCNC: 21 MG/DL (ref 8–23)
BUN/CREAT BLD: 18 (ref 9–20)
CALCIUM SERPL-MCNC: 8.4 MG/DL (ref 8.6–10.4)
CHLORIDE BLD-SCNC: 105 MMOL/L (ref 98–107)
CO2: 21 MMOL/L (ref 20–31)
CREAT SERPL-MCNC: 1.19 MG/DL (ref 0.7–1.2)
GFR AFRICAN AMERICAN: >60 ML/MIN
GFR NON-AFRICAN AMERICAN: >60 ML/MIN
GFR SERPL CREATININE-BSD FRML MDRD: ABNORMAL ML/MIN/{1.73_M2}
GFR SERPL CREATININE-BSD FRML MDRD: ABNORMAL ML/MIN/{1.73_M2}
GLUCOSE BLD-MCNC: 144 MG/DL (ref 74–100)
GLUCOSE BLD-MCNC: 207 MG/DL (ref 74–100)
GLUCOSE BLD-MCNC: 209 MG/DL (ref 74–100)
GLUCOSE BLD-MCNC: 211 MG/DL (ref 70–99)
GLUCOSE BLD-MCNC: 218 MG/DL (ref 74–100)
HCT VFR BLD CALC: 26.2 % (ref 40.7–50.3)
HEMOGLOBIN: 8 G/DL (ref 13–17)
MCH RBC QN AUTO: 27.9 PG (ref 25.2–33.5)
MCHC RBC AUTO-ENTMCNC: 30.5 G/DL (ref 28.4–34.8)
MCV RBC AUTO: 91.3 FL (ref 82.6–102.9)
NRBC AUTOMATED: 0 PER 100 WBC
PDW BLD-RTO: 13.2 % (ref 11.8–14.4)
PLATELET # BLD: ABNORMAL K/UL (ref 138–453)
PLATELET, FLUORESCENCE: 37 K/UL (ref 138–453)
PLATELET, IMMATURE FRACTION: 5.9 % (ref 1.1–10.3)
PMV BLD AUTO: ABNORMAL FL (ref 8.1–13.5)
POTASSIUM SERPL-SCNC: 3.6 MMOL/L (ref 3.7–5.3)
RBC # BLD: 2.87 M/UL (ref 4.21–5.77)
SODIUM BLD-SCNC: 135 MMOL/L (ref 135–144)
TOTAL PROTEIN: 5.9 G/DL (ref 6.4–8.3)
WBC # BLD: 1.6 K/UL (ref 3.5–11.3)

## 2021-09-09 PROCEDURE — 36415 COLL VENOUS BLD VENIPUNCTURE: CPT

## 2021-09-09 PROCEDURE — 94761 N-INVAS EAR/PLS OXIMETRY MLT: CPT

## 2021-09-09 PROCEDURE — 6360000002 HC RX W HCPCS: Performed by: FAMILY MEDICINE

## 2021-09-09 PROCEDURE — 80053 COMPREHEN METABOLIC PANEL: CPT

## 2021-09-09 PROCEDURE — 85027 COMPLETE CBC AUTOMATED: CPT

## 2021-09-09 PROCEDURE — 97162 PT EVAL MOD COMPLEX 30 MIN: CPT

## 2021-09-09 PROCEDURE — 2580000003 HC RX 258: Performed by: FAMILY MEDICINE

## 2021-09-09 PROCEDURE — 2580000003 HC RX 258: Performed by: NURSE PRACTITIONER

## 2021-09-09 PROCEDURE — 97166 OT EVAL MOD COMPLEX 45 MIN: CPT

## 2021-09-09 PROCEDURE — 6370000000 HC RX 637 (ALT 250 FOR IP): Performed by: NURSE PRACTITIONER

## 2021-09-09 PROCEDURE — 6360000002 HC RX W HCPCS: Performed by: NURSE PRACTITIONER

## 2021-09-09 PROCEDURE — 97110 THERAPEUTIC EXERCISES: CPT

## 2021-09-09 PROCEDURE — 82947 ASSAY GLUCOSE BLOOD QUANT: CPT

## 2021-09-09 PROCEDURE — 85055 RETICULATED PLATELET ASSAY: CPT

## 2021-09-09 PROCEDURE — 1200000000 HC SEMI PRIVATE

## 2021-09-09 RX ORDER — FERROUS SULFATE 325(65) MG
324 TABLET ORAL DAILY
Status: DISCONTINUED | OUTPATIENT
Start: 2021-09-09 | End: 2021-09-10 | Stop reason: HOSPADM

## 2021-09-09 RX ORDER — METFORMIN HYDROCHLORIDE 500 MG/1
1000 TABLET, EXTENDED RELEASE ORAL 2 TIMES DAILY WITH MEALS
Status: DISCONTINUED | OUTPATIENT
Start: 2021-09-09 | End: 2021-09-10 | Stop reason: HOSPADM

## 2021-09-09 RX ORDER — INSULIN GLARGINE 100 [IU]/ML
35 INJECTION, SOLUTION SUBCUTANEOUS EVERY MORNING
Status: DISCONTINUED | OUTPATIENT
Start: 2021-09-09 | End: 2021-09-10 | Stop reason: HOSPADM

## 2021-09-09 RX ORDER — DOCUSATE SODIUM 100 MG/1
100 CAPSULE, LIQUID FILLED ORAL 2 TIMES DAILY
Status: DISCONTINUED | OUTPATIENT
Start: 2021-09-09 | End: 2021-09-10 | Stop reason: HOSPADM

## 2021-09-09 RX ORDER — RISPERIDONE 0.25 MG/1
0.25 TABLET, FILM COATED ORAL 2 TIMES DAILY
Status: DISCONTINUED | OUTPATIENT
Start: 2021-09-09 | End: 2021-09-10 | Stop reason: HOSPADM

## 2021-09-09 RX ORDER — MULTIVITAMIN WITH IRON
1 TABLET ORAL DAILY
Status: DISCONTINUED | OUTPATIENT
Start: 2021-09-09 | End: 2021-09-10 | Stop reason: HOSPADM

## 2021-09-09 RX ORDER — LISINOPRIL 2.5 MG/1
2.5 TABLET ORAL DAILY
Status: DISCONTINUED | OUTPATIENT
Start: 2021-09-09 | End: 2021-09-10 | Stop reason: HOSPADM

## 2021-09-09 RX ORDER — GLIPIZIDE 5 MG/1
10 TABLET ORAL
Status: DISCONTINUED | OUTPATIENT
Start: 2021-09-09 | End: 2021-09-10 | Stop reason: HOSPADM

## 2021-09-09 RX ORDER — CHLORHEXIDINE GLUCONATE 0.12 MG/ML
15 RINSE ORAL DAILY
Status: DISCONTINUED | OUTPATIENT
Start: 2021-09-09 | End: 2021-09-10 | Stop reason: HOSPADM

## 2021-09-09 RX ORDER — CETIRIZINE HYDROCHLORIDE 10 MG/1
10 TABLET ORAL DAILY
Refills: 5 | Status: DISCONTINUED | OUTPATIENT
Start: 2021-09-09 | End: 2021-09-10 | Stop reason: HOSPADM

## 2021-09-09 RX ORDER — PANTOPRAZOLE SODIUM 40 MG/1
40 TABLET, DELAYED RELEASE ORAL
Status: DISCONTINUED | OUTPATIENT
Start: 2021-09-09 | End: 2021-09-10 | Stop reason: HOSPADM

## 2021-09-09 RX ORDER — NAPROXEN 250 MG/1
250 TABLET ORAL 3 TIMES DAILY PRN
Status: DISCONTINUED | OUTPATIENT
Start: 2021-09-09 | End: 2021-09-10 | Stop reason: HOSPADM

## 2021-09-09 RX ORDER — ATORVASTATIN CALCIUM 10 MG/1
10 TABLET, FILM COATED ORAL NIGHTLY
Refills: 5 | Status: DISCONTINUED | OUTPATIENT
Start: 2021-09-09 | End: 2021-09-10 | Stop reason: HOSPADM

## 2021-09-09 RX ORDER — LAMOTRIGINE 100 MG/1
100 TABLET ORAL DAILY
Status: DISCONTINUED | OUTPATIENT
Start: 2021-09-09 | End: 2021-09-10 | Stop reason: HOSPADM

## 2021-09-09 RX ADMIN — SODIUM CHLORIDE: 9 INJECTION, SOLUTION INTRAVENOUS at 18:41

## 2021-09-09 RX ADMIN — CEFTRIAXONE 1000 MG: 1 INJECTION, POWDER, FOR SOLUTION INTRAMUSCULAR; INTRAVENOUS at 07:10

## 2021-09-09 RX ADMIN — DOCUSATE SODIUM 100 MG: 100 CAPSULE ORAL at 10:29

## 2021-09-09 RX ADMIN — GLIPIZIDE 10 MG: 5 TABLET ORAL at 10:28

## 2021-09-09 RX ADMIN — CHLORHEXIDINE GLUCONATE 0.12% ORAL RINSE 15 ML: 1.2 LIQUID ORAL at 10:29

## 2021-09-09 RX ADMIN — RISPERIDONE 0.25 MG: 0.25 TABLET ORAL at 10:28

## 2021-09-09 RX ADMIN — CETIRIZINE HYDROCHLORIDE 10 MG: 10 TABLET, FILM COATED ORAL at 10:28

## 2021-09-09 RX ADMIN — METFORMIN HYDROCHLORIDE 1000 MG: 500 TABLET, EXTENDED RELEASE ORAL at 16:43

## 2021-09-09 RX ADMIN — RISPERIDONE 0.25 MG: 0.25 TABLET ORAL at 20:11

## 2021-09-09 RX ADMIN — METFORMIN HYDROCHLORIDE 1000 MG: 500 TABLET, EXTENDED RELEASE ORAL at 10:28

## 2021-09-09 RX ADMIN — PANTOPRAZOLE SODIUM 40 MG: 40 TABLET, DELAYED RELEASE ORAL at 10:28

## 2021-09-09 RX ADMIN — LISINOPRIL 2.5 MG: 2.5 TABLET ORAL at 10:29

## 2021-09-09 RX ADMIN — INSULIN GLARGINE 35 UNITS: 100 INJECTION, SOLUTION SUBCUTANEOUS at 10:29

## 2021-09-09 RX ADMIN — INSULIN LISPRO 2 UNITS: 100 INJECTION, SOLUTION INTRAVENOUS; SUBCUTANEOUS at 20:14

## 2021-09-09 RX ADMIN — ATORVASTATIN CALCIUM 10 MG: 10 TABLET, FILM COATED ORAL at 20:11

## 2021-09-09 RX ADMIN — METOPROLOL TARTRATE 25 MG: 25 TABLET, FILM COATED ORAL at 10:28

## 2021-09-09 RX ADMIN — DOCUSATE SODIUM 100 MG: 100 CAPSULE ORAL at 20:11

## 2021-09-09 RX ADMIN — FERROUS SULFATE TAB 325 MG (65 MG ELEMENTAL FE) 324 MG: 325 (65 FE) TAB at 10:28

## 2021-09-09 RX ADMIN — MULTIVITAMIN TABLET 1 TABLET: TABLET at 10:28

## 2021-09-09 RX ADMIN — GLIPIZIDE 10 MG: 5 TABLET ORAL at 16:43

## 2021-09-09 RX ADMIN — LAMOTRIGINE 100 MG: 100 TABLET ORAL at 10:28

## 2021-09-09 RX ADMIN — ENOXAPARIN SODIUM 40 MG: 40 INJECTION SUBCUTANEOUS at 08:16

## 2021-09-09 ASSESSMENT — PAIN - FUNCTIONAL ASSESSMENT: PAIN_FUNCTIONAL_ASSESSMENT: ACTIVITIES ARE NOT PREVENTED

## 2021-09-09 ASSESSMENT — PAIN DESCRIPTION - FREQUENCY: FREQUENCY: INTERMITTENT

## 2021-09-09 ASSESSMENT — PAIN SCALES - GENERAL
PAINLEVEL_OUTOF10: 2
PAINLEVEL_OUTOF10: 0
PAINLEVEL_OUTOF10: 3
PAINLEVEL_OUTOF10: 0
PAINLEVEL_OUTOF10: 0

## 2021-09-09 ASSESSMENT — ENCOUNTER SYMPTOMS
EYE REDNESS: 0
ABDOMINAL PAIN: 0
COLOR CHANGE: 1
NAUSEA: 0
SHORTNESS OF BREATH: 0
VOMITING: 0

## 2021-09-09 ASSESSMENT — PAIN DESCRIPTION - ONSET: ONSET: ON-GOING

## 2021-09-09 ASSESSMENT — PAIN DESCRIPTION - PAIN TYPE
TYPE: ACUTE PAIN
TYPE: ACUTE PAIN

## 2021-09-09 ASSESSMENT — PAIN DESCRIPTION - ORIENTATION
ORIENTATION: LEFT
ORIENTATION: LEFT

## 2021-09-09 ASSESSMENT — PAIN DESCRIPTION - LOCATION
LOCATION: LEG
LOCATION: LEG

## 2021-09-09 ASSESSMENT — PAIN DESCRIPTION - PROGRESSION: CLINICAL_PROGRESSION: GRADUALLY WORSENING

## 2021-09-09 ASSESSMENT — PAIN DESCRIPTION - DESCRIPTORS: DESCRIPTORS: OTHER (COMMENT)

## 2021-09-09 NOTE — PROGRESS NOTES
IVAN reviewed pt chart this date to complete assessment. Pt is a 57year old male admitted for LLE cellulitis. Pt lives in a group home in [place] with other individuals and staff. Pt was not using any devices to ambulate with. Pt receives assistance from staff at the group home as needed with IADLs and ADLs and transportation. Pt is a full code and follows with Dr Tom Cabrera as PCP. Pt does not have advance directive documents on file but does have a guardian through [employer] by the name of Georgia Deutsch. IVAN called to [employer] office and left a message requesting pt's guardianship documents be faxed to Mendy Carlos also left a message at group home to find out about pt's prior level of function. Pt has Medicare and Medicaid for insurance, so medications should be covered pretty well for pt. Plan for pt to potentially discharge back to group home tomorrow if he is doing well. IVAN will follow and remain available.  Michela Borrero MSW GUILLERMOW 9/9/2021

## 2021-09-09 NOTE — PROGRESS NOTES
Physical Therapy    Facility/Department: [facility] AT THE [facility] MED SURG  Initial Assessment    NAME: Shira Allison  : 1960  MRN: [id number]    Date of Service: 2021    Discharge Recommendations:  Continue to assess pending progress        Assessment   Body structures, Functions, Activity limitations: Decreased functional mobility ; Decreased balance;Decreased strength;Decreased high-level IADLs;Decreased safe awareness;Decreased endurance  Assessment: Pt is a 57year old male that was hospitalized for L LE cellulitis. Pt presents with general weakness, reduced safety, decreased ambulation tolerance and reduced dynamic standing balance. Pt will benefit from skilled PT in order to address these deficits. Treatment Diagnosis: general weakness  Decision Making: Medium Complexity  PT Education: Goals;PT Role;Plan of Care  REQUIRES PT FOLLOW UP: Yes  Activity Tolerance  Activity Tolerance: Patient Tolerated treatment well       Patient Diagnosis(es): The encounter diagnosis was Cellulitis of left leg.     has a past medical history of Cataracts, bilateral, Cirrhosis ([address] DM type 2 (diabetes mellitus, type 2) ([address] Esophageal varices without bleeding ([address] Essential hypertension, Hepatitis, Mental disability, Mixed hyperlipidemia, Portal hypertension ([address] and TB (tuberculosis). has a past surgical history that includes eye surgery; Nasal polyp surgery; Upper gastrointestinal endoscopy (2013); Upper gastrointestinal endoscopy (13); Upper gastrointestinal endoscopy (14); Upper gastrointestinal endoscopy (10-6-14); Upper gastrointestinal endoscopy (1/20/15); Colonoscopy (2016); Upper gastrointestinal endoscopy (2016); Upper gastrointestinal endoscopy (2016); Upper gastrointestinal endoscopy (2017); pr esophagogastroduodenoscopy transoral diagnostic (N/A, 3/14/2017); Cataract removal (); Cholecystectomy ();  Nose surgery; pr esophagogastroduodenoscopy transoral diagnostic (N/A, 11/29/2017); pr colon ca scrn not hi rsk ind (N/A, 11/29/2017); Upper gastrointestinal endoscopy (11/29/2017); Colonoscopy (11/29/2017); Upper gastrointestinal endoscopy (08/15/2018); pr esophagogastroduodenoscopy transoral diagnostic (N/A, 8/15/2018); Upper gastrointestinal endoscopy (03/05/2019); Upper gastrointestinal endoscopy (N/A, 3/5/2019); Upper gastrointestinal endoscopy (04/09/2019); Upper gastrointestinal endoscopy (N/A, 4/9/2019); Upper gastrointestinal endoscopy (N/A, 6/12/2019); Upper gastrointestinal endoscopy (N/A, 10/15/2019); Endoscopy, colon, diagnostic; Endoscopy, colon, diagnostic; Upper gastrointestinal endoscopy (N/A, 12/2/2019); Upper gastrointestinal endoscopy (N/A, 3/16/2020); Upper gastrointestinal endoscopy (07/27/2020); Upper gastrointestinal endoscopy (N/A, 7/27/2020); Upper gastrointestinal endoscopy (05/05/2021); and Upper gastrointestinal endoscopy (N/A, 5/5/2021).     Restrictions  Restrictions/Precautions  Restrictions/Precautions: General Precautions, Fall Risk  Vision/Hearing  Vision: Within Functional Limits  Hearing: Within functional limits     Subjective  General  Chart Reviewed: Yes  Response To Previous Treatment: Not applicable  Family / Caregiver Present: No  Referring Practitioner: Dr. Leandro Barrios  Referral Date : 09/09/21  Diagnosis: cellulitis of L LE  Subjective  Subjective: pt denies pain at this time  Pain Screening  Patient Currently in Pain: Yes          Orientation  Orientation  Overall Orientation Status: Impaired  Orientation Level: Oriented to person  Social/Functional History  Social/Functional History  Type of Home: Facility  Home Layout: One level  Bathroom Shower/Tub: Tub/Shower unit  ADL Assistance: Independent  Homemaking Assistance: Needs assistance  Ambulation Assistance: Independent  Transfer Assistance: Independent    Objective     AROM RLE (degrees)  RLE AROM: WFL  AROM LLE (degrees)  LLE AROM : WFL  Strength RLE  Comment: grossly 4-/5  Strength LLE  Comment: grossly 4-/5        Bed mobility  Supine to Sit: Contact guard assistance  Transfers  Sit to Stand: Contact guard assistance  Stand to sit: Contact guard assistance  Ambulation  Ambulation?: Yes  Ambulation 1  Device: No Device  Assistance: Contact guard assistance  Distance: 15 feet     Balance  Posture: Fair  Sitting - Static: Good  Sitting - Dynamic: Good  Standing - Static: Fair  Standing - Dynamic: [address]  Times per week: 7x/wk  Times per day: Twice a day  Plan weeks: 2x/daily except weekends 1x/daily  Current Treatment Recommendations: Strengthening, Neuromuscular Re-education, Home Exercise Program, Manual Therapy - Soft Tissue Mobilization, Safety Education & Training, Balance Training, Endurance Training, Patient/Caregiver Education & Training, Functional Mobility Training, Manual Therapy - Joint Manipulation, Transfer Training, Gait Training, Stair training  Safety Devices  Type of devices: Left in chair, Call light within reach, Chair alarm in place    AM-PAC Score     AM-PAC Inpatient Mobility without Stair Climbing Raw Score : 15 (09/09/21 1016)  AM-PAC Inpatient without Stair Climbing T-Scale Score : 43.03 (09/09/21 1016)  Mobility Inpatient CMS 0-100% Score: 47.43 (09/09/21 1016)  Mobility Inpatient without Stair CMS G-Code Modifier : CK (09/09/21 1016)       Goals  Short term goals  Time Frame for Short term goals: 20 days  Short term goal 1: Pt will be educated on his POC  Short term goal 2: Pt will perform all transfers with supervision in order to increase independence  Short term goal 3: Pt will ambulate 100 feet with LRAD with SBA in order to return to PLOF  Short term goal 4: Pt will increase dynamic standing balance to Fair + in order to reduce fall risk       Therapy Time   Individual Concurrent Group Co-treatment   Time In 0747         Time Out 0803         Minutes 16                 Benny Brown PT

## 2021-09-09 NOTE — PROGRESS NOTES
Physical Therapy  Facility/Department: [facility] AT THE [facility] MED SURG  Daily Treatment Note  NAME: Cindy Caicedo  : 1960  MRN: [id number]    Date of Service: 2021    Discharge Recommendations:  Continue to assess pending progress        Assessment   Treatment Diagnosis: general weakness  Decision Making: Medium Complexity  REQUIRES PT FOLLOW UP: Yes  Activity Tolerance  Activity Tolerance: Patient Tolerated treatment well;Patient limited by fatigue     Patient Diagnosis(es): The encounter diagnosis was Cellulitis of left leg.     has a past medical history of Cataracts, bilateral, Cirrhosis ([address] DM type 2 (diabetes mellitus, type 2) ([address] Esophageal varices without bleeding ([address] Essential hypertension, Hepatitis, Mental disability, Mixed hyperlipidemia, Portal hypertension ([address] and TB (tuberculosis). has a past surgical history that includes eye surgery; Nasal polyp surgery; Upper gastrointestinal endoscopy (2013); Upper gastrointestinal endoscopy (13); Upper gastrointestinal endoscopy (14); Upper gastrointestinal endoscopy (10-6-14); Upper gastrointestinal endoscopy (1/20/15); Colonoscopy (2016); Upper gastrointestinal endoscopy (2016); Upper gastrointestinal endoscopy (2016); Upper gastrointestinal endoscopy (2017); pr esophagogastroduodenoscopy transoral diagnostic (N/A, 3/14/2017); Cataract removal (); Cholecystectomy (); Nose surgery; pr esophagogastroduodenoscopy transoral diagnostic (N/A, 2017); pr colon ca scrn not hi rsk ind (N/A, 2017); Upper gastrointestinal endoscopy (2017); Colonoscopy (2017); Upper gastrointestinal endoscopy (08/15/2018); pr esophagogastroduodenoscopy transoral diagnostic (N/A, 8/15/2018); Upper gastrointestinal endoscopy (2019); Upper gastrointestinal endoscopy (N/A, 3/5/2019); Upper gastrointestinal endoscopy (2019); Upper gastrointestinal endoscopy (N/A, 2019);  Upper gastrointestinal endoscopy (N/A, 6/12/2019); Upper gastrointestinal endoscopy (N/A, 10/15/2019); Endoscopy, colon, diagnostic; Endoscopy, colon, diagnostic; Upper gastrointestinal endoscopy (N/A, 12/2/2019); Upper gastrointestinal endoscopy (N/A, 3/16/2020); Upper gastrointestinal endoscopy (07/27/2020); Upper gastrointestinal endoscopy (N/A, 7/27/2020); Upper gastrointestinal endoscopy (05/05/2021); and Upper gastrointestinal endoscopy (N/A, 5/5/2021). Restrictions  Restrictions/Precautions  Restrictions/Precautions: General Precautions, Fall Risk  Subjective   General  Chart Reviewed: Yes  Response To Previous Treatment: Patient with no complaints from previous session. Family / Caregiver Present: No  Referring Practitioner: Dr. Sandra Toledo: Pt denies pain today, states he is tired. Pt states he is willing to do some exercises but does not want to get up. Orientation  Orientation  Overall Orientation Status: Impaired  Orientation Level: Oriented to person  Cognition      Objective   Bed mobility  Rolling to Right: Stand by assistance  Transfers  Comment: Pt declined transfers at this time, stating \"I'm just too tired and I want to take a nap\". Ambulation  Ambulation?: No (Pt declined stating he was too tired.)        Exercises  Straight Leg Raise: x10  Quad Sets: x10  Hip Flexion: x10  Hip Abduction: x10  Knee Short Arc Quad: x10  Ankle Pumps: x15  Comments: BLE ther ex completed in supine, AAROM as needed for technique.                                   Goals  Short term goals  Time Frame for Short term goals: 20 days  Short term goal 1: Pt will be educated on his POC  Short term goal 2: Pt will perform all transfers with supervision in order to increase independence  Short term goal 3: Pt will ambulate 100 feet with LRAD with SBA in order to return to PLOF  Short term goal 4: Pt will increase dynamic standing balance to Fair + in order to reduce fall risk    Plan    Plan  Times per week: 7x/wk  Times per day: Twice a day  Plan weeks: 2x/daily except weekends 1x/daily  Current Treatment Recommendations: Strengthening, Neuromuscular Re-education, Home Exercise Program, Manual Therapy - Soft Tissue Mobilization, Safety Education & Training, Balance Training, Endurance Training, Patient/Caregiver Education & Training, Functional Mobility Training, Manual Therapy - Joint Manipulation, Transfer Training, Gait Training, Stair training  Safety Devices  Type of devices: Bed alarm in place, All fall risk precautions in place, Call light within reach, Nurse notified, Patient at risk for falls, Left in bed     Therapy Time   Individual Concurrent Group Co-treatment   Time In 1342         Time Out 1400         Minutes [address] [place], [place]

## 2021-09-09 NOTE — PROGRESS NOTES
IVAN received return call from Kristyn Grissom, staff from pt's group home. Kristyn Grissom reports that pt can be a big  at times and will talk about injuries he sustained 30 years ago but make it sound like it's current. Kristyn Grissom reports that pt was not using any DME prior to admission. Pt is diabetic and staff help him to monitor his blood sugars and give him insulin as needed. Pt is legally blind and sits about 1 foot away from his television at home to be able to see it. Kristyn Grissom reports that pt is typically able to walk and talk perfectly fine and follow commands but he will sleep all day if you let him. Kristyn Grissom also reports that pt wants to be involved with his family but callahan to a past very bad situation is not able to be in contact with them. Kristyn Grissom reports that pt likes to fidget with buttons on the television remote and will plug and unplug things due to his OCD. Kristyn Grissom reports that they will be glad to have him back at the group home when he is ready to be discharged. Cassi's direct number is [phone] to call when he is ready for discharge.  Hemant LOPEZ 9/9/2021

## 2021-09-09 NOTE — PROGRESS NOTES
Morning medications administered at this time, given whole/crushed in applesauce. Patient tolerated well, denies needs at this time. Bed alarm remains activated, call light in reach. Will continue to monitor.

## 2021-09-09 NOTE — PROGRESS NOTES
Pt resting in bed quietly. Assessment and vital signs as charted. Pt is alert and oriented to self and place. Pt denies pain at this time. Pt denies any other needs. Bed alarm on. Call light in reach. Will continue to monitor.

## 2021-09-09 NOTE — PROGRESS NOTES
Pharmacy Note  Vancomycin Consult    Sandro Dsouza is a 57 y.o. male started on Vancomycin for cellulitis; consult received from Dr. Fred Snider to manage therapy. Also receiving the following antibiotics:  . Patient Active Problem List   Diagnosis    Esophageal varices ([address]    Hypertension    Candidal balanitis    Type 2 diabetes mellitus without complication, without long-term current use of insulin ([facility] [facility] [address]    Mixed hyperlipidemia    Thrombocytopenia (HCC)    Necrobiosis lipoidica diabeticorum (HCC)    Cirrhosis of liver (HCC)    Portal hypertension (HCC)    Iron deficiency anemia    Malabsorption    History of colon polyps    Rotator cuff impingement syndrome of right shoulder    Type 2 diabetes mellitus with hyperglycemia    Cellulitis of left leg     Allergies:  Bee venom     Temp max: 97.7 F  (36.5 C)    Recent Labs     09/08/21  2030   BUN 25*   CREATININE 1.27*   WBC 3.0*     No intake or output data in the 24 hours ending 09/08/21 2330  Culture Date      Source                       Results       Ht Readings from Last 1 Encounters:   09/08/21 6' 5\" (1.956 m)        Wt Readings from Last 1 Encounters:   09/08/21 200 lb 3.2 oz (90.8 kg)       Body mass index is 23.74 kg/m². Estimated Creatinine Clearance: 76 mL/min (A) (based on SCr of 1.27 mg/dL (H)). Goal of AUC:  400-600 mg/L.hr  Goal Trough Level: 10-20 mcg/mL    Assessment/Plan:  Will initiate Vancomycin with a one time loading dose of 1250 mg x1, followed by 750 mg IV every 12 hours. Timing of trough level will be determined based on culture results, renal function, and clinical response. Thank you for the consult. Will continue to follow.

## 2021-09-09 NOTE — PROGRESS NOTES
IVAN received return call from Samanta De León at [facility]. She will have office staff fax over guardianship documents. Discussed potential discharge plan for return to group home maybe tomorrow and she is in agreement with this plan. IVAN will continue to follow and remain available. Maureen Oneill MSW LSW 9/9/2021     Guardianship documentation received and placed in pt paper chart.  Maureen GUTIERREZ LSW 9/9/2021

## 2021-09-09 NOTE — ED PROVIDER NOTES
[facility]      Pt Name: Joseline Blake  MRN: [id number]  [place] 1960  Date of evaluation: 9/8/2021  Provider: Chaya Phelan MD    [address] [place] [address]       Chief Complaint   Patient presents with    Leg Pain     left; ongoing for a few days; seen and treated in [facility] ED last evening         HISTORY OF PRESENT ILLNESS   (Location/Symptom, Timing/Onset, Context/Setting, Quality, Duration, Modifying Factors, Severity)  Note limiting factors. Joseline Blake is a 57 y.o. male with PMH type 2 diabetes, hyperlipidemia, cirrhosis of liver, esophageal varices, hypertension who presents to the emergency department with left leg pain, redness and swelling. Patient is here with his caregiver. Patient states this has been ongoing for a few days, and he was seen in a different ER and discharged yesterday after a few doses of Rocephin IV. There he had a significant work-up including left venous duplex, CTA PE which were both negative. Patient denies fever, cough, chest pain, shortness of breath, abdominal pain, nausea, vomiting. HPI    Nursing Notes were reviewed. REVIEW OF SYSTEMS    (2-9 systems for level 4, 10 or more for level 5)     Review of Systems   Constitutional: Negative for activity change. HENT: Negative for congestion. Eyes: Negative for redness. Respiratory: Negative for shortness of breath. Cardiovascular: Negative for chest pain and leg swelling. Gastrointestinal: Negative for abdominal pain, nausea and vomiting. Genitourinary: Negative for dysuria. Musculoskeletal: Positive for myalgias. Skin: Positive for color change. Negative for rash. Except as noted above the remainder of the review of systems was reviewed and negative.        PAST MEDICAL HISTORY     Past Medical History:   Diagnosis Date    Cataracts, bilateral     Cirrhosis ([address]     DM type 2 (diabetes mellitus, type 2) (HCC)     Esophageal varices without bleeding [facility])     Essential hypertension     Hepatitis     A and B    Mental disability     Mixed hyperlipidemia     Portal hypertension ([facility] [address] [facility]     TB (tuberculosis)          SURGICAL HISTORY       Past Surgical History:   Procedure Laterality Date    CATARACT REMOVAL  1977    CHOLECYSTECTOMY  2007    COLONOSCOPY  03/21/2016    -polyps,diverticulosis,hemorrhoids    COLONOSCOPY  11/29/2017    -hemorrhoids    ENDOSCOPY, COLON, DIAGNOSTIC      EGD numerous times    ENDOSCOPY, COLON, DIAGNOSTIC      most recent 11-05-12    EYE SURGERY      bilateral cataracts    NASAL POLYP SURGERY      NOSE SURGERY      NV COLON CA SCRN NOT  [address] IND N/A 11/29/2017    COLONOSCOPY performed by Girma Haas MD at [address] ESOPHAGOGASTRODUODENOSCOPY TRANSORAL DIAGNOSTIC N/A 3/14/2017    EGD ESOPHAGOGASTRODUODENOSCOPY performed by Girma Haas MD at [address] ESOPHAGOGASTRODUODENOSCOPY TRANSORAL DIAGNOSTIC N/A 11/29/2017    EGD ESOPHAGOGASTRODUODENOSCOPY performed by Girma Haas MD at [address] ESOPHAGOGASTRODUODENOSCOPY TRANSORAL DIAGNOSTIC N/A 8/15/2018    EGD ESOPHAGOGASTRODUODENOSCOPY performed by Girma Haas MD at [address] [facility]  08-    Dr. Manjit Womack  11/4/13    Varicies banding x4    UPPER GASTROINTESTINAL ENDOSCOPY  6/23/14    UPPER GASTROINTESTINAL ENDOSCOPY  10-6-14    Dr. Jodi Kraus ENDOSCOPY  1/20/15        UPPER GASTROINTESTINAL ENDOSCOPY  03/21/2016    -bx,portal HTN, varices    UPPER GASTROINTESTINAL ENDOSCOPY  09/19/2016    -esoph varices,hypertensive gastropathy    UPPER GASTROINTESTINAL ENDOSCOPY  03/14/2017    Dr Livingston Lab varices,gastric varices    UPPER GASTROINTESTINAL ENDOSCOPY  11/29/2017    -portal hypertension,esophageal varices    UPPER GASTROINTESTINAL ENDOSCOPY  08/15/2018    Dr. Xi Koenig hypertension wth esophageal varices    UPPER GASTROINTESTINAL ENDOSCOPY  03/05/2019    Dr Migel Bo band ligation,portal hypertension with esophageal varices portal hypertensive gastropathy    UPPER GASTROINTESTINAL ENDOSCOPY N/A 3/5/2019    EGD BAND LIGATION performed by Maya Aguayo MD at [facility]  04/09/2019    per Dr. Irene Vail 4/9/2019    Maritza-portal hypertension with esophageal varices & portal hypertensive gastropathy, variceal band ligation    UPPER GASTROINTESTINAL ENDOSCOPY N/A 6/12/2019    -portal hypertension with esophageal varices    UPPER GASTROINTESTINAL ENDOSCOPY N/A 10/15/2019    -portal hypertension,esophageal varices-banding    UPPER GASTROINTESTINAL ENDOSCOPY N/A 12/2/2019    -portal hypertensive gastropathy,distal esophageal scarring with grade 2 varices    UPPER GASTROINTESTINAL ENDOSCOPY N/A 3/16/2020    -scarring R/T banding,multiple columns grade 2 esopha varices,portal hypertensive gastropathy    UPPER GASTROINTESTINAL ENDOSCOPY  07/27/2020    Dr Jayla Straussul H-Pylori)erosive bulbar duodenitis,distal esoph scarring from EVL,several columns grade 2 esophageal varices    UPPER GASTROINTESTINAL ENDOSCOPY N/A 7/27/2020    EGD BIOPSY performed by Maya Aguayo MD at [place] [address]  05/05/2021    T/    UPPER GASTROINTESTINAL ENDOSCOPY N/A 5/5/2021    EGD BIOPSY performed by Luisito Quintanilla MD at [address] [place] [address]       Current Discharge Medication List      CONTINUE these medications which have NOT CHANGED    Details   risperiDONE (RISPERDAL) 0.25 MG tablet Take 0.25 mg by mouth 2 times daily      insulin glargine (BASAGLAR KWIKPEN) 100 UNIT/ML injection pen Inject 50 Units into the skin every morning  Qty: 30 pen, Refills: 3      ferrous gluconate 324 (37.5 Fe) MG TABS Take 1 tablet by mouth daily  Qty: 30 tablet, Refills: 5    Associated Diagnoses: Iron deficiency anemia, unspecified iron deficiency anemia type;  Intestinal malabsorption, unspecified type      Multiple Vitamin (DAILY-BEHZAD MULTIVITAMIN) TABS Take 1 tablet by mouth daily  Qty: 90 tablet, Refills: 1      glipiZIDE (GLUCOTROL XL) 10 MG extended release tablet Take 1 tablet by mouth 2 times daily  Qty: 180 tablet, Refills: 1      docusate sodium (COLACE) 100 MG capsule Take 1 capsule by mouth 2 times daily  Qty: 180 capsule, Refills: 1      metFORMIN (GLUCOPHAGE-XR) 500 MG extended release tablet GIVE 2 TABLETS (1000MG) BY MOUTH TWICE DAILY  Qty: 360 tablet, Refills: 1      omeprazole (PRILOSEC) 20 MG delayed release capsule Take 1 capsule by mouth Daily  Qty: 30 capsule, Refills: 4      chlorhexidine (PERIDEX) 0.12 % solution USE 15 ML SWISH AND SPIT ONCE DAILY  Qty: 1 Bottle, Refills: 3      SM ANTACID ANTI--200-20 MG/5ML SUSP suspension Take 30 mLs by mouth every 4 hours as needed for Indigestion (stomach acid or heartburn or gas)  Qty: 335 mL, Refills: 11      Insulin Pen Needle (BD ULTRA-FINE PEN NEEDLES) 29G X 12.7MM MISC 1 each by Does not apply route daily  Qty: 100 each, Refills: 8      simvastatin (ZOCOR) 20 MG tablet Take 1 tablet by mouth daily  Qty: 31 tablet, Refills: 5      loratadine (CLARITIN) 10 MG tablet Take 1 tablet by mouth daily  Qty: 31 tablet, Refills: 5      metoprolol tartrate (LOPRESSOR) 25 MG tablet Take 1 tablet by mouth daily  Qty: 31 tablet, Refills: 9      ONETOUCH ULTRA strip USE AS DIRECTED 3 TIMES A DAY  Qty: 300 strip, Refills: 1    Associated Diagnoses: Type 2 diabetes mellitus without complication, without long-term current use of insulin (HCC)      lisinopril (PRINIVIL;ZESTRIL) 2.5 MG tablet Take 1 tablet by mouth daily  Qty: 90 tablet, Refills: 3      sodium chloride (SALINE MIST) 0.65 % nasal spray 2 sprays by Nasal route daily  Qty: 30 mL, Refills: 8      ONE TOUCH ULTRASOFT LANCETS MISC TEST BS DAILY AT 8AM AND 2 HOURS POST SUPPER WEEKLY ON SATURDAY . DX. E11.9  Qty: 200 each, Refills: 3      dextran 70-hypromellose (GENTEAL TEARS) 0.1-0.3 % SOLN opthalmic solution Place 1 drop into both eyes 3 times daily      lamoTRIgine (LAMICTAL) 100 MG tablet Take 1 tablet by mouth daily  Qty: 30 tablet, Refills: 3      Hypromellose (GENTEAL MILD TO MODERATE OP) Place 1 drop into both eyes 2 times daily      naproxen (NAPROSYN) 250 MG tablet Take 250 mg by mouth as needed for Pain      EPINEPHrine (EPIPEN) 0.3 MG/0.3ML SOAJ injection Inject 1 pen intramuscularly as directed as needed for reaction to bee stings  Qty: 2 each, Refills: 1      clotrimazole-betamethasone (LOTRISONE) 1-0.05 % cream Apply topically 2 times daily. PRN  Qty: 1 Tube, Refills: 0      carbamide peroxide (DEBROX) 6.5 % otic solution Place 4 drops into both ears 2 times daily Indications: Sunday and Thursday in the evening      acetaminophen (MAPAP) 325 MG tablet Take 2 tablets by mouth every 6 hours as needed for Pain  Qty: 90 tablet, Refills: 2      Dimethicone (AVEENO DAILY MOISTURIZING) 1.25 % LOTN APPLY TOPICALLY TO FEET ONCE WEEKLY  Qty: 1 Bottle, Refills: 9      miconazole (MICOTIN) 2 % powder Apply topically every morning Apply topically 2 times daily. Disposable Gloves (LATEX GLOVES ONE SIZE) MISC Use as directed. Dispense 1 box  Qty: 1 each, Refills: 6      NONFORMULARY Sea salt nose spray 2 sprays each nostril daily      Throat Lozenges (HALLS COUGH DROPS MT) Take 1 lozenge by mouth every 4 hours as needed. ALLERGIES     Bee venom    FAMILY HISTORY     No family history on file.        SOCIAL HISTORY       Social History     Socioeconomic History    Marital status: Single     Spouse name: Not on file    Number of children: Not on file    Years of education: Not on file    Highest education level: Not on file   Occupational History    Not on file   Tobacco Use    Smoking status: Never Smoker    Smokeless tobacco: Never Used   Vaping Use    Vaping Use: Former   Substance and Sexual Activity    Alcohol use: No    Drug use: No    Sexual activity: Not on file   Other Topics Concern    Not on file   Social History Narrative    Not on file     Social Determinants of Health     Financial Resource Strain: Low Risk     Difficulty of Paying Living Expenses: Not hard at all   Food Insecurity: No Food Insecurity    Worried About Running Out of Food in the Last Year: Never true    [address] in the Last Year: Never true   Transportation Needs:     Lack of Transportation (Medical):  Lack of Transportation (Non-Medical):    Physical Activity:     Days of Exercise per Week:     Minutes of Exercise per Session:    Stress:     Feeling of Stress :    Social Connections:     Frequency of Communication with Friends and Family:     Frequency of Social Gatherings with Friends and Family:     Attends L[employer] Services:     Active Member of Clubs or Organizations:     Attends Club or Organization Meetings:     Marital Status:    Intimate Partner Violence:     Fear of Current or Ex-Partner:     Emotionally Abused:     Physically Abused:     Sexually Abused:        SCREENINGS        Barnhart Coma Scale  Eye Opening: Spontaneous  Best Verbal Response: Oriented  Best Motor Response: Obeys commands  Barnhart Coma Scale Score: 15               PHYSICAL EXAM    (up to 7 for level 4, 8 or more for level 5)     ED Triage Vitals   BP Temp Temp Source Pulse Resp SpO2 Height Weight   09/08/21 1712 09/08/21 1712 09/08/21 1712 09/08/21 1712 09/08/21 1712 09/08/21 1712 09/08/21 2026 09/08/21 2026   (!) 141/66 97.7 °F (36.5 °C) Tympanic 98 16 97 % 6' 5\" (1.956 m) 199 lb (90.3 kg)       Physical Exam  Constitutional:       Appearance: Normal appearance. HENT:      Head: Normocephalic and atraumatic.       Right Ear: External ear normal.      Left Ear: External ear normal.      Nose: Nose normal.      Mouth/Throat:      Mouth: Mucous membranes are moist.   Eyes:      Conjunctiva/sclera: Conjunctivae normal.   Cardiovascular:      Rate and Rhythm: Normal rate and regular rhythm. Pulses: Normal pulses. Heart sounds: Normal heart sounds. Pulmonary:      Effort: Pulmonary effort is normal.      Breath sounds: Normal breath sounds. No wheezing. Abdominal:      General: There is no distension. Palpations: Abdomen is soft. Tenderness: There is no abdominal tenderness. Musculoskeletal:         General: Swelling and tenderness present. Cervical back: Normal range of motion. Comments: Left lower leg erythema, swelling with warmth to palpation   Skin:     General: Skin is warm and dry. Neurological:      General: No focal deficit present. Mental Status: He is alert and oriented to person, place, and time.          DIAGNOSTIC RESULTS     EKG: All EKG's are interpreted by the Emergency Department Physician who either signs or Co-signs this chart in the absence of a cardiologist.      RADIOLOGY:   Non-plain film images such as CT, Ultrasound and MRI are read by the radiologist. Plain radiographic images are visualized and preliminarily interpreted by the emergency physician with the below findings:      Interpretation per the Radiologist below, if available at the time of this note:    No orders to display         ED BEDSIDE ULTRASOUND:   Performed by ED Physician - none    LABS:  Labs Reviewed   CBC WITH AUTO DIFFERENTIAL - Abnormal; Notable for the following components:       Result Value    WBC 3.0 (*)     RBC 3.22 (*)     Hemoglobin 9.0 (*)     Hematocrit 28.8 (*)     Seg Neutrophils 68 (*)     Lymphocytes 20 (*)     Absolute Lymph # 0.60 (*)     All other components within normal limits   BASIC METABOLIC PANEL W/ REFLEX TO MG FOR LOW K - Abnormal; Notable for the following components:    Glucose 196 (*)     BUN 25 (*)     CREATININE 1.27 (*)     Potassium 3.5 (*)     GFR Non- 57 (*)     All other components within normal limits   PROTIME-INR - Abnormal; Notable for the following components:    Protime 16.0 (*)     All other components within normal limits   APTT - Abnormal; Notable for the following components:    PTT 39.9 (*)     All other components within normal limits   IMMATURE PLATELET FRACTION - Abnormal; Notable for the following components:    Platelet, Fluorescence 44 (*)     All other components within normal limits   GLUCOSE, WHOLE BLOOD - Abnormal; Notable for the following components:    POC Glucose 210 (*)     All other components within normal limits   CULTURE, BLOOD 1   MAGNESIUM   HEMOGLOBIN A1C   CBC   COMPREHENSIVE METABOLIC PANEL W/ REFLEX TO MG FOR LOW K   POCT GLUCOSE   POCT GLUCOSE   POCT GLUCOSE       All other labs were within normal range or not returned as of this dictation. EMERGENCY DEPARTMENT COURSE/MDM:   Vitals:    Vitals:    09/08/21 2145 09/08/21 2245 09/09/21 0305 09/09/21 0330   BP:  (!) 142/69  134/68   Pulse:  88  72   Resp:  16  16   Temp:  96.6 °F (35.9 °C)  96.8 °F (36 °C)   TempSrc:  Temporal  Temporal   SpO2: 97% 97%  96%   Weight:  200 lb 3.2 oz (90.8 kg) 202 lb 1.6 oz (91.7 kg)    Height:  6' 5\" (1.956 m)           This is a 57 y.o. male presenting with left leg redness, pain and swelling. Records reviewed, significant for medical conditions as described. Initial orders included CBC, BMP, coags, blood cultures. Labs and imaging significant for leukopenia, anemia, mild NANY. Interventions included IV fluids and antibiotics. Likely diagnoses include left lower leg cellulitis. DVT and PE were ruled out at other hospital as listed in care everywhere. Discussed with Dr Francis Palmer who accepted the patient for admission. Patient will be admitted for further treatment of likely cellulitis. Discussed with patient and/or family members/companions accompanying patient who agree with plan. All questions were answered.      CONSULTS:  IP CONSULT TO CASE MANAGEMENT  PHARMACY TO DOSE VANCOMYCIN    PROCEDURES:  Unless otherwise noted below, none     Procedures    FINAL IMPRESSION      1. Cellulitis of left leg          DISPOSITION/PLAN   DISPOSITION Decision To Admit 09/08/2021 09:16:41 PM      PATIENT REFERRED TO:  No follow-up provider specified. DISCHARGE MEDICATIONS:  Current Discharge Medication List        Controlled Substances Monitoring:     No flowsheet data found.     (Please note that portions of this note were completed with a voice recognition program.  Efforts were made to edit the dictations but occasionally words are mis-transcribed.)    Clarisa Daley MD (electronically signed)  Attending Emergency Physician          Charlesetta Bumpers, MD  09/09/21 [id number]

## 2021-09-09 NOTE — PROGRESS NOTES
Pt arrived to floor from ED at 2235, admitted by Dr. Ankita Arce for Dr. Alina Ratliff due to cellulitis of left leg. Pt accompanied by staff from [facility]. Pt is legally blind but writer was able to orient to room and call light buttons. Pt demonstrates use. VS obtained and assessment done as charted. Pt oriented to person and time only. Disaster navigator completed. Writer left message with legal guardian Juan [facility] to update on admission status. Writer called Dr. Ankita Arce to obtain orders. IV fluids started. FSBS 210, insulin coverage administered. [place] [facility] meal pack provided. Pt repositioned after finishing eating. Pt denies any other needs. Call light within reach. Bed alarm in place, will monitor.

## 2021-09-09 NOTE — PROGRESS NOTES
Patient's legal guardian Crystal, with [employer], called in at this time for an update. Update given on the patient. Will update the patient that his legal guardian is aware of his admission and was given an update.

## 2021-09-09 NOTE — H&P
History and Physical    Patient:  Foster Yeung  MRN: [id number]    Chief Complaint: Left lower leg pain    History Obtained From:  patient, electronic medical record    PCP: Cheyenne Daugherty MD    History of Present Illness: The patient is a 57 y.o. male who presented to the emergency room with complaints of left leg pain redness and swelling. Patient's caregiver was with him during the evaluation. Patient stated is been ongoing for a few days. He stated he was seen at [facility] and discharged after a few doses of Rocephin. He stated he had a significant work-up including a left venous Doppler and a CTA for PE which were both negative. Patient denied recent illness including fever chills. He denied chest pain or palpitations. He denied shortness of breath or cough. He denied nausea vomiting diarrhea. Patient was afebrile. Patient has a history of thrombocytopenia, esophageal varices, cirrhosis. Patient also has history of anemia as well as chronic kidney disease. Patient was found to have acute kidney injury and was placed on IV fluids as well as IV antibiotics.     Past Medical History:        Diagnosis Date    Cataracts, bilateral     Cirrhosis ([address]     DM type 2 (diabetes mellitus, type 2) ([address]     Esophageal varices without bleeding ([address]     Essential hypertension     Hepatitis     A and B    Mental disability     Mixed hyperlipidemia     Portal hypertension ([address]     TB (tuberculosis)        Past Surgical History:        Procedure Laterality Date    CATARACT REMOVAL  1977    CHOLECYSTECTOMY  2007    COLONOSCOPY  03/21/2016    -polyps,diverticulosis,hemorrhoids    COLONOSCOPY  11/29/2017    -hemorrhoids    ENDOSCOPY, COLON, DIAGNOSTIC      EGD numerous times    ENDOSCOPY, COLON, DIAGNOSTIC      most recent 11-05-12    EYE SURGERY      bilateral cataracts    NASAL POLYP SURGERY      NOSE SURGERY      NM COLON CA SCRN NOT  [address] IND N/A 11/29/2017 COLONOSCOPY performed by Reynold Briceno MD at [address] ESOPHAGOGASTRODUODENOSCOPY TRANSORAL DIAGNOSTIC N/A 3/14/2017    EGD ESOPHAGOGASTRODUODENOSCOPY performed by Reynold Briceno MD at [address] ESOPHAGOGASTRODUODENOSCOPY TRANSORAL DIAGNOSTIC N/A 11/29/2017    EGD ESOPHAGOGASTRODUODENOSCOPY performed by Reynold Briceno MD at [address] ESOPHAGOGASTRODUODENOSCOPY TRANSORAL DIAGNOSTIC N/A 8/15/2018    EGD ESOPHAGOGASTRODUODENOSCOPY performed by Reynold Briceno MD at [facility]  08-    Dr. Sudhir Hardin  11/4/13    Varicies banding x4    UPPER GASTROINTESTINAL ENDOSCOPY  6/23/14    UPPER GASTROINTESTINAL ENDOSCOPY  10-6-14    Dr. Pierre Johnson ENDOSCOPY  1/20/15        UPPER GASTROINTESTINAL ENDOSCOPY  03/21/2016    -bx,portal HTN, varices    UPPER GASTROINTESTINAL ENDOSCOPY  09/19/2016    -esoph varices,hypertensive gastropathy    UPPER GASTROINTESTINAL ENDOSCOPY  03/14/2017    Dr Suad Ayoubod varices,gastric varices    UPPER GASTROINTESTINAL ENDOSCOPY  11/29/2017    -portal hypertension,esophageal varices    UPPER GASTROINTESTINAL ENDOSCOPY  08/15/2018    Dr. Jerad Maurer hypertension [facility] esophageal varices    UPPER GASTROINTESTINAL ENDOSCOPY  03/05/2019    Dr Monet Licea band ligation,portal hypertension with esophageal varices portal hypertensive gastropathy    UPPER GASTROINTESTINAL ENDOSCOPY N/A 3/5/2019    EGD BAND LIGATION performed by Reynold Briceno MD at [address] [facility] [address]  04/09/2019    per Dr. Sudhir Hardin N/A 4/9/2019    Maritza-portal hypertension with esophageal varices & portal hypertensive gastropathy, variceal band ligation    UPPER GASTROINTESTINAL ENDOSCOPY N/A 6/12/2019    -portal hypertension with esophageal varices    UPPER GASTROINTESTINAL ENDOSCOPY N/A 10/15/2019    -portal hypertension,esophageal varices-banding    UPPER GASTROINTESTINAL ENDOSCOPY N/A 12/2/2019    -portal hypertensive gastropathy,distal esophageal scarring with grade 2 varices    UPPER GASTROINTESTINAL ENDOSCOPY N/A 3/16/2020    -scarring R/T banding,multiple columns grade 2 esopha varices,portal hypertensive gastropathy    UPPER GASTROINTESTINAL ENDOSCOPY  07/27/2020    Dr Ellie Cheek H-Pylori)erosive bulbar duodenitis,distal esoph scarring from EVL,several columns grade 2 esophageal varices    UPPER GASTROINTESTINAL ENDOSCOPY N/A 7/27/2020    EGD BIOPSY performed by Anastasia Boucher MD at [facility]  05/05/2021    MHT/    UPPER GASTROINTESTINAL ENDOSCOPY N/A 5/5/2021    EGD BIOPSY performed by Charlie Lane MD at [place] [address] History:   History reviewed. No pertinent family history. Social History:   TOBACCO:   reports that he has never smoked. He has never used smokeless tobacco.  ETOH:   reports no history of alcohol use. ELICIT DRUG USE:    Social History     Substance and Sexual Activity   Drug Use No     OCCUPATION: None      Allergies:  Bee venom    Medications Prior to Admission:    Prior to Admission medications    Medication Sig Start Date End Date Taking?  Authorizing Provider   risperiDONE (RISPERDAL) 0.25 MG tablet Take 0.25 mg by mouth 2 times daily 8/26/21  Yes Historical Provider, MD   insulin glargine (BASAGLAR KWIKPEN) 100 UNIT/ML injection pen Inject 50 Units into the skin every morning  Patient taking differently: Inject 35 Units into the skin every morning Changed to 35 units per dr Nayla Somers 8/30/21  Yes Cali Quiroz MD   ferrous gluconate 324 (37.5 Fe) MG TABS Take 1 tablet by mouth daily 8/20/21  Yes Lorna Morrow MD   Multiple Vitamin (DAILY-BEHZAD MULTIVITAMIN) TABS Take 1 tablet by mouth daily 6/18/21  Yes Cali Quiroz MD   glipiZIDE (GLUCOTROL XL) 10 MG extended release tablet Take 1 tablet by mouth 2 times daily 6/18/21  Yes Kathy Cueto MD   docusate sodium (COLACE) 100 MG capsule Take 1 capsule by mouth 2 times daily 6/18/21  Yes Kathy Cueto MD   metFORMIN (GLUCOPHAGE-XR) 500 MG extended release tablet GIVE 2 TABLETS (1000MG) BY MOUTH TWICE DAILY 6/18/21  Yes Kathy Cueto MD   omeprazole (PRILOSEC) 20 MG delayed release capsule Take 1 capsule by mouth Daily 6/18/21  Yes Kathy Cueto MD   chlorhexidine (PERIDEX) 0.12 % solution USE 15 ML SWISH AND SPIT ONCE DAILY 5/10/21  Yes Kathy Cueto MD   SM ANTACID ANTI--193-09 MG/5ML SUSP suspension Take 30 mLs by mouth every 4 hours as needed for Indigestion (stomach acid or heartburn or gas) 5/4/21  Yes Kathy Cueto MD   Insulin Pen Needle (BD ULTRA-FINE PEN NEEDLES) 29G X 12.7MM MISC 1 each by Does not apply route daily 4/30/21  Yes Kathy Cueto MD   simvastatin (ZOCOR) 20 MG tablet Take 1 tablet by mouth daily 3/19/21  Yes Kathy Cueto MD   loratadine (CLARITIN) 10 MG tablet Take 1 tablet by mouth daily 3/19/21  Yes Kathy Cueto MD   metoprolol tartrate (LOPRESSOR) 25 MG tablet Take 1 tablet by mouth daily 3/5/21  Yes Kathy Cueto MD   [facility] ULTRA strip USE AS DIRECTED 3 TIMES A DAY 2/9/21  Yes Kathy Cueto MD   lisinopril (PRINIVIL;ZESTRIL) 2.5 MG tablet Take 1 tablet by mouth daily 2/1/21  Yes Kathy Cueto MD   sodium chloride (SALINE MIST) 0.65 % nasal spray 2 sprays by Nasal route daily 8/25/20  Yes Kathy Cueto MD   ONE TOUCH ULTRASOFT LANCETS MISC TEST BS DAILY AT [address] 2 HOURS POST SUPPER WEEKLY ON SATURDAY .  DX. E11.9 2/27/20  Yes Kathy Cueto MD   dextran 70-hypromellose (GENTEAL TEARS) 0.1-0.3 % SOLN opthalmic solution Place 1 drop into both eyes 3 times daily   Yes Historical Provider, MD   lamoTRIgine (LAMICTAL) 100 MG tablet Take 1 tablet by mouth daily 8/1/18  Yes Kathy Cueto MD   Hypromellose [facility] MILD TO MODERATE OP) Place 1 drop into both eyes 2 times daily   Yes Historical Provider, MD   naproxen (NAPROSYN) 250 MG tablet Take 250 mg by mouth as needed for Pain    Historical Provider, MD   EPINEPHrine (EPIPEN) 0.3 MG/0.3ML SOAJ injection Inject 1 pen intramuscularly as directed as needed for reaction to bee stings 11/16/20   Rodríguez De Jesus MD   clotrimazole-betamethasone (LOTRISONE) 1-0.05 % cream Apply topically 2 times daily. PRN 8/19/20   Audra Wood MD   carbamide peroxide (DEBROX) 6.5 % otic solution Place 4 drops into both ears 2 times daily Indications: Sunday and Thursday in the evening    Historical Provider, MD   acetaminophen (MAPAP) 325 MG tablet Take 2 tablets by mouth every 6 hours as needed for Pain 2/11/19   Rodríguez De Jesus MD   Dimethicone (AVEENO DAILY MOISTURIZING) 1.25 % LOTN APPLY TOPICALLY TO FEET ONCE WEEKLY 6/28/17   Rodríguez De Jesus MD   miconazole (MICOTIN) 2 % powder Apply topically every morning Apply topically 2 times daily. Historical Provider, MD   Disposable Gloves (LATEX GLOVES ONE SIZE) MISC Use as directed. Dispense 1 box 1/27/15   Rodríguez De Jesus MD   NONFORMULARY Sea salt nose spray 2 sprays each nostril daily    Historical Provider, MD   Throat Lozenges (HALLS COUGH DROPS MT) Take 1 lozenge by mouth every 4 hours as needed. Historical Provider, MD       Review of Systems:  Constitutional:negative  for fevers, and negative for chills.   Eyes: negative for visual disturbance   ENT: negative for sore throat, negative nasal congestion, and negative for earache  Respiratory: negative for shortness of breath, negative for cough, and negative for wheezing  Cardiovascular: negative for chest pain, negative for palpitations, and negative for syncope  Gastrointestinal: negative for abdominal pain, negative for nausea,negative for vomiting, negative for diarrhea, negative for constipation, and negative for hematochezia or melena  Genitourinary: negative for dysuria, negative for urinary urgency, negative for urinary frequency, and negative for hematuria  Skin: negative for skin rash, and negative for skin lesions  Neurological: negative for unilateral weakness, numbness or tingling. Physical Exam:    Vitals:   Temp: 98.3 °F (36.8 °C)  BP: 114/69  Resp: 16  Pulse: 74  SpO2: 97 %  24HR INTAKE/OUTPUT:      Intake/Output Summary (Last 24 hours) at 9/9/2021 1707  Last data filed at 9/9/2021 1345  Gross per 24 hour   Intake 1477.65 ml   Output --   Net 1477.65 ml       Exam:  GEN:  alert and oriented to person, place and time, in no acute distress, alert and with flat affect  EYES: No gross abnormalities. , PERRL and EOMI  NECK: normal, supple, no lymphadenopathy,  no carotid bruits  PULM: clear to auscultation bilaterally- no wheezes, rales or rhonchi, normal air movement, no respiratory distress  COR: regular rate & rhythm, no murmurs, no gallops, S1 normal and S2 normal  ABD:  soft, non-tender, non-distended, normal bowel sounds, no masses or organomegaly  EXT:   no cyanosis, clubbing or edema present    NEURO: follows commands, GUZMAN, no deficits  SKIN:  no rashes or significant lesions, chronic stasis changes to left lower extremity, no erythema or edema.   Tender to touch  -----------------------------------------------------------------  Diagnostic Data:   Lab Results   Component Value Date    WBC 1.6 (L) 09/09/2021    HGB 8.0 (L) 09/09/2021    PLT See Reflexed IPF Result 09/09/2021       Lab Results   Component Value Date    BUN 21 09/09/2021    CREATININE 1.19 09/09/2021     09/09/2021    K 3.6 (L) 09/09/2021    CALCIUM 8.4 (L) 09/09/2021     09/09/2021    CO2 21 09/09/2021    LABGLOM >60 09/09/2021       Lab Results   Component Value Date    LEUKOCYTESUR neg 01/07/2016    SPECGRAV 1.025 01/07/2016    GLUCOSEU >1,000 01/07/2016    KETUA 15 01/07/2016    PROTEINU neg 01/07/2016       No results found for: MYOGLOBIN, TROPONINT, CKTOTAL, CKMB, PROBNP    No results found. No orders to display       Assessment:    Active Problems:    Esophageal varices (HCC)    Hypertension    Type 2 diabetes mellitus without complication, without long-term current use of insulin (HCC)    Thrombocytopenia (HCC)    Cirrhosis of liver (HCC)    Portal hypertension (HCC)    Iron deficiency anemia    Cellulitis of left leg    Mild malnutrition (HCC)  Resolved Problems:    * No resolved hospital problems.  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/2016    Thrombocytopenia ([address] 08/22/2016    Type 2 diabetes mellitus without complication, without long-term current use of insulin ([address]     Mixed hyperlipidemia     Candidal balanitis 01/14/2016    Hypertension     Esophageal varices ([address] 02/04/2013       Plan:     · This patient requires inpatient admission because of cellulitis left lower leg  · Factors affecting the medical complexity of this patient include thrombocytopenia, type 2 diabetes  · Estimated length of stay is 2 days  · Left lower leg cellulitis  · IV Rocephin  · Continue IV fluids  · Trend labs  · PT OT  · Type 2 diabetes  · POCT before meals and at bedtime  · Medium dose sliding scale  · Hypoglycemia protocol  · Continue Glucotrol, Lantus, Glucophage  · DVT prophylaxis: SCD  · Peptic ulcer prophylaxis: Pepcid  · High risk medications: none  · Social Service and Case Management consults for DC planning  · Dietician consult initiated    CORE MEASURES  DVT prophylaxis: SCD  Decubitus ulcer present on admission: No  CODE STATUS: FULL CODE  Nutrition Status: good   Physical therapy: Yes   Old Charts reviewed: Yes  EKG Reviewed: No  Advance Directive Addressed: Yes    SUZANNE Polo - CNP, SUZANNE, NP-C  9/9/2021, 5:07 PM

## 2021-09-09 NOTE — PROGRESS NOTES
Comprehensive Nutrition Assessment    Type and Reason for Visit:  Initial    Nutrition Recommendations/Plan:  Encourage oral intakes     Nutrition Assessment:  Mild malnutrition r/t inadequate nutrient intakes, AEB mild muscular losses (may be more of a a chronic problem). Altered nutrition related labs r/t endocrine dysfunction, AEB A1C 9.3. Increased nutrient needs r/t acute injury or trauma, AEB cellulitis. Slow to respond to questions upon visit. Not appropriate for diabetes education at this time. Lives in group home environment per nursing, where staff may benefit from education more than residents. Blindness reported and may require meal set up. Will add Glucerna tid. Would recommend to check vit D status. Malnutrition Assessment:  Malnutrition Status:  Mild malnutrition    Context:  Acute Illness     Findings of the 6 clinical characteristics of malnutrition:  Energy Intake:  Mild decrease in energy intake (Comment) (per interview)  Weight Loss:  No significant weight loss     Body Fat Loss:  No significant body fat loss     Muscle Mass Loss:  1 - Mild muscle mass loss Temples (temporalis), Clavicles (pectoralis & deltoids), Hand (interosseous)  Fluid Accumulation:  1 - Mild Extremities   Strength:  Not Performed    Estimated Daily Nutrient Needs:  Energy (kcal):  0117-6122 (22-25); Weight Used for Energy Requirements:  Current     Protein (g):  110-119 (1.2-1.3); Weight Used for Protein Requirements:  Current        Fluid (ml/day):  2300; Method Used for Fluid Requirements:  1 ml/kcal      Nutrition Related Findings:  mild muscular losses in temples/clavicles      Wounds:   (cellulitis)       Current Nutrition Therapies:    ADULT DIET;  Regular; 4 carb choices (60 gm/meal)  Adult Oral Nutrition Supplement; Diabetic Oral Supplement    Anthropometric Measures:  · Height: 6' 5\" (195.6 cm)  · Current Body Weight: 202 lb 1.6 oz (91.7 kg)   · Admission Body Weight: 199 lb (90.3 kg)    · Usual Body Weight: 201 lb (91.2 kg)     · Ideal Body Weight: 208 lbs; % Ideal Body Weight 97.2 %   · BMI: 24  · Adjusted Body Weight:  ; No Adjustment   · BMI Categories: Normal Weight (BMI 18.5-24. 9)       Nutrition Diagnosis:   · Increased nutrient needs related to acute injury/trauma as evidenced by wounds    · Altered nutrition-related lab values related to endocrine dysfuntion as evidenced by lab values    · Mild malnutrition related to inadequate protein-energy intake as evidenced by mild muscle loss, localized or generalized fluid accumulation    Lab Results   Component Value Date     09/09/2021    K 3.6 (L) 09/09/2021     09/09/2021    CO2 21 09/09/2021    BUN 21 09/09/2021    CREATININE 1.19 09/09/2021    GLUCOSE 211 (H) 09/09/2021    CALCIUM 8.4 (L) 09/09/2021    PROT 5.9 (L) 09/09/2021    LABALBU 3.1 (L) 09/09/2021    BILITOT 0.64 09/09/2021    ALKPHOS 77 09/09/2021    AST 40 (H) 09/09/2021    ALT 22 09/09/2021    LABGLOM >60 09/09/2021    GFRAA >60 09/09/2021    GLOB NOT REPORTED 12/23/2014     Lab Results   Component Value Date    LABA1C 9.3 07/08/2021     Lab Results   Component Value Date     07/22/2016     No results found for: VITD25    Nutrition Interventions:   Food and/or Nutrient Delivery:  Continue Current Diet, Start Oral Nutrition Supplement  Nutrition Education/Counseling:  Education not appropriate   Coordination of Nutrition Care:  Continue to monitor while inpatient    Goals:  PO >75% meals and supplements.  Meal set up/assistance       Nutrition Monitoring and Evaluation:   Behavioral-Environmental Outcomes:  Knowledge or Skill   Food/Nutrient Intake Outcomes:  Food and Nutrient Intake, Supplement Intake  Physical Signs/Symptoms Outcomes:  Biochemical Data, Weight, Fluid Status or Edema     Discharge Planning:    No discharge needs at this time     Electronically signed by Ernesto Mendoza RD, LD on 9/9/21 at 9:12 AM EDT    Contact: [phone]

## 2021-09-09 NOTE — PLAN OF CARE
Problem: Pain:  Goal: Pain level will decrease  Description: Pain level will decrease  Outcome: Ongoing  Note: Pain assessed every 4 hours. Patient is able to communicate with staff the need for pain interventions. Patient currently complaining of slight pain in his leg. PRN pain medications available as needed. Will continue to monitor. Goal: Control of acute pain  Description: Control of acute pain  Outcome: Ongoing  Goal: Control of chronic pain  Description: Control of chronic pain  Outcome: Ongoing     Problem: Falls - Risk of:  Goal: Will remain free from falls  Description: Will remain free from falls  Outcome: Ongoing  Note: Fall assessment completed daily. Bed in lowest position. Call light within reach. Falling star posted to outside of door. Fall risk sticker in place on ID band. Side rails up 2/4. Bed alarm on for safety. Patient ambulates with a +1 assist. Will continue to monitor. Goal: Absence of physical injury  Description: Absence of physical injury  Outcome: Ongoing     Problem: Discharge Planning:  Goal: Discharged to appropriate level of care  Description: Discharged to appropriate level of care  Outcome: Ongoing  Note: Case management working with the patient. Problem: Body Temperature - Imbalanced:  Goal: Ability to maintain a body temperature in the normal range will improve  Description: Ability to maintain a body temperature in the normal range will improve  Outcome: Ongoing  Note: Patient has been afebrile thus far this shift. Will continue to monitor temperature per unit routine and PRN. Problem: Mobility - Impaired:  Goal: Mobility will improve to maximum level  Description: Mobility will improve to maximum level  Outcome: Ongoing  Note: PT/OT working the patient. Problem: Pain:  Goal: Pain level will decrease  Description: Pain level will decrease  Outcome: Ongoing  Note: Pain assessed every 4 hours.  Patient is able to communicate with staff the need for pain interventions. Patient currently complaining of slight pain in his leg. PRN pain medications available as needed. Will continue to monitor. Goal: Control of acute pain  Description: Control of acute pain  Outcome: Ongoing  Goal: Control of chronic pain  Description: Control of chronic pain  Outcome: Ongoing     Problem: Skin Integrity - Impaired:  Goal: Will show no infection signs and symptoms  Description: Will show no infection signs and symptoms  Outcome: Ongoing  Goal: Absence of new skin breakdown  Description: Absence of new skin breakdown  Outcome: Ongoing  Note: No new skin breakdown noted.

## 2021-09-09 NOTE — CONSULTS
Vancomycin Dosing by Pharmacy - Initial Note   TODAY'S DATE:  9/9/2021  Patient name, age:  Etienne Mcgovern, 57 y.o. Indication: SSTI  Additional antimicrobials:  N/A    Allergies:  Bee venom   Actual Weight:    Wt Readings from Last 1 Encounters:   09/09/21 202 lb 1.6 oz (91.7 kg)     Labs/Ancillary Data  Estimated Creatinine Clearance: 76 mL/min (A) (based on SCr of 1.27 mg/dL (H)). Recent Labs     09/08/21  2030   CREATININE 1.27*   BUN 25*   WBC 3.0*     No results found for: PROCAL    Intake/Output Summary (Last 24 hours) at 9/9/2021 0648  Last data filed at 9/9/2021 0645  Gross per 24 hour   Intake 852.62 ml   Output --   Net 852.62 ml     Temp: afebrile    Recent vancomycin administrations                     vancomycin (VANCOCIN) 1,250 mg in dextrose 5 % 250 mL IVPB (mg) 1,250 mg New Bag 09/08/21 2109                  Culture Date / Source  /  Results  9/8                    Blood        In process      PLAN   Initial loading dose of 25mg/kg (max of 3,000 mg) = 1250 mg  x 1 (given 9/8 @ 2112), then vancomycin 750 mg IV every 12 hours (starting 9/9 @ 0900)   Ensured BUN/sCr ordered at baseline and every 48 hours x at least 3 levels, then at least weekly. Vancomycin level ordered for 9/10 @ 0600      Vancomycin Target Concentration Parameters  Treatment  Population Target AUC/LOUIS Target Trough   Invasive MRSA Infection (bacteremia, pneumonia, meningitis, endocarditis, osteomyelitis)  Sepsis (undifferentiated) 400-600 N/A   Infection due to non-MRSA pathogen  Empiric treatment of non-invasive MRSA infection  (SSTI, UTI) <500 10-15 mg/L   CrCl < 29 mL/min  Rapidly fluctuating serum creatinine   NANY N/A < 15 mg/L     Renal replacement therapy is dosed by levels, per hospital protocol. Abbreviations  * Pauc: probability that AUC is >400 (efficacy); Pconc: probability that Ctrough is above 20 ?g/mL (toxicity); Tox: Probability of nephrotoxicity, based on Wyatt et al. Clin Infect Dis 2009.     Thank you for the consult. Pharmacy will continue to follow.     Azucena Cuellar, PharmD 9/9/2021 6:55 AM

## 2021-09-09 NOTE — PROGRESS NOTES
Occupational Therapy   Occupational Therapy Initial Assessment  Date: 2021   Patient Name: Cesilia Zamora  MRN: [id number]     : 1960    Date of Service: 2021    Discharge Recommendations:  Continue to assess pending progress       Assessment   Performance deficits / Impairments: Decreased functional mobility ; Decreased ADL status; Decreased strength;Decreased endurance;Decreased balance  Assessment: Patient admitted to hospital due to cellulitis of LLE. Patient presents with generalized weakness and deconditioning, requiring increased need for assist during ADL. OT to address to ensure safe return home. Treatment Diagnosis: Weakness  Prognosis: Good  Decision Making: Medium Complexity  OT Education: OT Role;Plan of Care;Transfer Training  REQUIRES OT FOLLOW UP: Yes  Safety Devices  Safety Devices in place: Yes  Type of devices: Left in bed;Bed alarm in place;Call light within reach; All fall risk precautions in place           Patient Diagnosis(es): The encounter diagnosis was Cellulitis of left leg.     has a past medical history of Cataracts, bilateral, Cirrhosis ([address] DM type 2 (diabetes mellitus, type 2) ([address] Esophageal varices without bleeding ([address] Essential hypertension, Hepatitis, Mental disability, Mixed hyperlipidemia, Portal hypertension ([address] and TB (tuberculosis). has a past surgical history that includes eye surgery; Nasal polyp surgery; Upper gastrointestinal endoscopy (2013); Upper gastrointestinal endoscopy (13); Upper gastrointestinal endoscopy (14); Upper gastrointestinal endoscopy (10-6-14); Upper gastrointestinal endoscopy (1/20/15); Colonoscopy (2016); Upper gastrointestinal endoscopy (2016); Upper gastrointestinal endoscopy (2016); Upper gastrointestinal endoscopy (2017); pr esophagogastroduodenoscopy transoral diagnostic (N/A, 3/14/2017); Cataract removal (); Cholecystectomy ();  Nose surgery; pr esophagogastroduodenoscopy transoral diagnostic (N/A, 11/29/2017); pr colon ca scrn not hi rsk ind (N/A, 11/29/2017); Upper gastrointestinal endoscopy (11/29/2017); Colonoscopy (11/29/2017); Upper gastrointestinal endoscopy (08/15/2018); pr esophagogastroduodenoscopy transoral diagnostic (N/A, 8/15/2018); Upper gastrointestinal endoscopy (03/05/2019); Upper gastrointestinal endoscopy (N/A, 3/5/2019); Upper gastrointestinal endoscopy (04/09/2019); Upper gastrointestinal endoscopy (N/A, 4/9/2019); Upper gastrointestinal endoscopy (N/A, 6/12/2019); Upper gastrointestinal endoscopy (N/A, 10/15/2019); Endoscopy, colon, diagnostic; Endoscopy, colon, diagnostic; Upper gastrointestinal endoscopy (N/A, 12/2/2019); Upper gastrointestinal endoscopy (N/A, 3/16/2020); Upper gastrointestinal endoscopy (07/27/2020); Upper gastrointestinal endoscopy (N/A, 7/27/2020); Upper gastrointestinal endoscopy (05/05/2021); and Upper gastrointestinal endoscopy (N/A, 5/5/2021). Treatment Diagnosis: Weakness      Restrictions  Restrictions/Precautions  Restrictions/Precautions: General Precautions, Fall Risk    Subjective   Subjective  Subjective: Patient states discomfort LLE, agreeable to OT evaluation    Social/Functional History  Social/Functional History  Type of Home: Facility  Home Layout: One level  Bathroom Shower/Tub: Tub/Shower unit  ADL Assistance: Independent  Homemaking Assistance: Needs assistance  Ambulation Assistance: Independent  Transfer Assistance: Independent  Additional Comments: Patient states that he lives with others in group home. States staff assist as needed.        Objective              Balance  Sitting Balance: Independent  Standing Balance: Contact guard assistance  Functional Mobility  Functional - Mobility Device: No device  Assist Level: Contact guard assistance  ADL  Feeding: Setup  Grooming: Contact guard assistance  UE Bathing: Stand by assistance  LE Bathing: Minimal assistance  UE Dressing: Stand by assistance  LE Dressing: Contact guard assistance  Toileting: Minimal assistance           Transfers  Sit to stand: Contact guard assistance  Stand to sit: Contact guard assistance                       LUE AROM (degrees)  LUE AROM : WFL  Left Hand AROM (degrees)  Left Hand AROM: WFL  RUE AROM (degrees)  RUE AROM : WFL  Right Hand AROM (degrees)  Right Hand AROM: WFL                      Plan   Plan  Times per week: 7  Times per day: Daily  Current Treatment Recommendations: Strengthening, Balance Training, Functional Mobility Training, Endurance Training, Safety Education & Training, Patient/Caregiver Education & Training, Equipment Evaluation, Education, & procurement      AM-PAC Score        AM-[facility] Inpatient Daily Activity Raw Score: 18 (09/09/21 1118)  AM-PAC Inpatient ADL T-Scale Score : 38.66 (09/09/21 1118)  ADL Inpatient CMS 0-100% Score: 46.65 (09/09/21 1118)  ADL Inpatient CMS G-Code Modifier : CK (09/09/21 1118)    Goals  Short term goals  Time Frame for Short term goals: 20 visits  Short term goal 1: Patient to complete self care routine c SUP to ensure safe return home. Short term goal 2: Patient to engage in 15 minutes of ther ex/ther act to improve strength and activity tolerance for self care. Short term goal 3: Patient to tolerate standing >7' while participating in functional task of choice to improve standing tolerane, balance and safety during ADL, mobility and transfers.        Therapy Time   Individual Concurrent Group Co-treatment   Time In 8773         Time Out 1008         Minutes Torsten Bello OTR/L

## 2021-09-10 VITALS
SYSTOLIC BLOOD PRESSURE: 126 MMHG | HEART RATE: 80 BPM | WEIGHT: 204 LBS | RESPIRATION RATE: 16 BRPM | OXYGEN SATURATION: 95 % | DIASTOLIC BLOOD PRESSURE: 76 MMHG | TEMPERATURE: 97.2 F | BODY MASS INDEX: 24.09 KG/M2 | HEIGHT: 77 IN

## 2021-09-10 LAB
ESTIMATED AVERAGE GLUCOSE: 200 MG/DL
GLUCOSE BLD-MCNC: 107 MG/DL (ref 74–100)
GLUCOSE BLD-MCNC: 124 MG/DL (ref 74–100)
GLUCOSE BLD-MCNC: 154 MG/DL (ref 74–100)
HBA1C MFR BLD: 8.6 % (ref 4–6)
HCT VFR BLD CALC: 25.2 % (ref 40.7–50.3)
HEMOGLOBIN: 7.7 G/DL (ref 13–17)
MCH RBC QN AUTO: 27.9 PG (ref 25.2–33.5)
MCHC RBC AUTO-ENTMCNC: 30.6 G/DL (ref 28.4–34.8)
MCV RBC AUTO: 91.3 FL (ref 82.6–102.9)
NRBC AUTOMATED: 0 PER 100 WBC
PDW BLD-RTO: 13.1 % (ref 11.8–14.4)
PLATELET # BLD: ABNORMAL K/UL (ref 138–453)
PLATELET, FLUORESCENCE: 41 K/UL (ref 138–453)
PLATELET, IMMATURE FRACTION: 4.4 % (ref 1.1–10.3)
PMV BLD AUTO: ABNORMAL FL (ref 8.1–13.5)
RBC # BLD: 2.76 M/UL (ref 4.21–5.77)
VANCOMYCIN TROUGH DATE LAST DOSE: ABNORMAL
VANCOMYCIN TROUGH DOSE AMOUNT: ABNORMAL
VANCOMYCIN TROUGH TIME LAST DOSE: ABNORMAL
VANCOMYCIN TROUGH: 5.5 UG/ML (ref 10–20)
WBC # BLD: 1.4 K/UL (ref 3.5–11.3)

## 2021-09-10 PROCEDURE — 85027 COMPLETE CBC AUTOMATED: CPT

## 2021-09-10 PROCEDURE — 97110 THERAPEUTIC EXERCISES: CPT

## 2021-09-10 PROCEDURE — 82947 ASSAY GLUCOSE BLOOD QUANT: CPT

## 2021-09-10 PROCEDURE — 36415 COLL VENOUS BLD VENIPUNCTURE: CPT

## 2021-09-10 PROCEDURE — 2580000003 HC RX 258: Performed by: NURSE PRACTITIONER

## 2021-09-10 PROCEDURE — 94761 N-INVAS EAR/PLS OXIMETRY MLT: CPT

## 2021-09-10 PROCEDURE — 6370000000 HC RX 637 (ALT 250 FOR IP): Performed by: NURSE PRACTITIONER

## 2021-09-10 PROCEDURE — 85055 RETICULATED PLATELET ASSAY: CPT

## 2021-09-10 PROCEDURE — 6360000002 HC RX W HCPCS: Performed by: NURSE PRACTITIONER

## 2021-09-10 PROCEDURE — 80202 ASSAY OF VANCOMYCIN: CPT

## 2021-09-10 PROCEDURE — 97535 SELF CARE MNGMENT TRAINING: CPT

## 2021-09-10 RX ORDER — INSULIN GLARGINE 100 [IU]/ML
35 INJECTION, SOLUTION SUBCUTANEOUS EVERY MORNING
Qty: 1 PEN | Refills: 2
Start: 2021-09-10 | End: 2021-10-27

## 2021-09-10 RX ORDER — CEPHALEXIN 500 MG/1
500 CAPSULE ORAL 3 TIMES DAILY
Qty: 30 CAPSULE | Refills: 0 | Status: SHIPPED | OUTPATIENT
Start: 2021-09-10 | End: 2021-10-01

## 2021-09-10 RX ADMIN — MULTIVITAMIN TABLET 1 TABLET: TABLET at 08:07

## 2021-09-10 RX ADMIN — CHLORHEXIDINE GLUCONATE 0.12% ORAL RINSE 15 ML: 1.2 LIQUID ORAL at 10:04

## 2021-09-10 RX ADMIN — RISPERIDONE 0.25 MG: 0.25 TABLET ORAL at 08:07

## 2021-09-10 RX ADMIN — PANTOPRAZOLE SODIUM 40 MG: 40 TABLET, DELAYED RELEASE ORAL at 08:07

## 2021-09-10 RX ADMIN — FERROUS SULFATE TAB 325 MG (65 MG ELEMENTAL FE) 324 MG: 325 (65 FE) TAB at 08:07

## 2021-09-10 RX ADMIN — LISINOPRIL 2.5 MG: 2.5 TABLET ORAL at 08:07

## 2021-09-10 RX ADMIN — GLIPIZIDE 10 MG: 5 TABLET ORAL at 08:07

## 2021-09-10 RX ADMIN — DOCUSATE SODIUM 100 MG: 100 CAPSULE ORAL at 08:07

## 2021-09-10 RX ADMIN — INSULIN GLARGINE 35 UNITS: 100 INJECTION, SOLUTION SUBCUTANEOUS at 08:07

## 2021-09-10 RX ADMIN — METFORMIN HYDROCHLORIDE 1000 MG: 500 TABLET, EXTENDED RELEASE ORAL at 08:07

## 2021-09-10 RX ADMIN — CEFTRIAXONE 1000 MG: 1 INJECTION, POWDER, FOR SOLUTION INTRAMUSCULAR; INTRAVENOUS at 08:00

## 2021-09-10 RX ADMIN — METOPROLOL TARTRATE 25 MG: 25 TABLET, FILM COATED ORAL at 08:07

## 2021-09-10 RX ADMIN — CETIRIZINE HYDROCHLORIDE 10 MG: 10 TABLET, FILM COATED ORAL at 08:07

## 2021-09-10 RX ADMIN — LAMOTRIGINE 100 MG: 100 TABLET ORAL at 08:07

## 2021-09-10 RX ADMIN — GLIPIZIDE 10 MG: 5 TABLET ORAL at 16:47

## 2021-09-10 RX ADMIN — NAPROXEN 250 MG: 250 TABLET ORAL at 00:03

## 2021-09-10 RX ADMIN — METFORMIN HYDROCHLORIDE 1000 MG: 500 TABLET, EXTENDED RELEASE ORAL at 16:47

## 2021-09-10 ASSESSMENT — PAIN SCALES - GENERAL: PAINLEVEL_OUTOF10: 0

## 2021-09-10 NOTE — PROGRESS NOTES
Pt resting in bed with eyes closed. Respirations even and unlabored. No distress noted. Bed alarm on. Call light in reach. Will continue to monitor.

## 2021-09-10 NOTE — PROGRESS NOTES
Occupational Therapy  Facility/Department: [facility] AT THE [facility] MED SURG  Daily Treatment Note  NAME: Sandro Dsouza  : 1960  MRN: [id number]    Date of Service: 9/10/2021    Discharge Recommendations:  Continue to assess pending progress       Assessment      OT Education: OT Role;Plan of Care;Transfer Training  Barriers to Learning: decreased cognition  Activity Tolerance  Activity Tolerance: Patient limited by fatigue  Safety Devices  Safety Devices in place: Yes  Type of devices: All fall risk precautions in place; Left in chair;Call light within reach; Chair alarm in place         Patient Diagnosis(es): The encounter diagnosis was Cellulitis of left leg.      has a past medical history of Cataracts, bilateral, [address] [address] DM type 2 (diabetes mellitus, type 2) ([facility] [address] Esophageal varices without bleeding ([facility] [address] Essential hypertension, Hepatitis, Mental disability, Mixed hyperlipidemia, Portal hypertension ([facility] [address] and TB (tuberculosis). has a past surgical history that includes eye surgery; Nasal polyp surgery; Upper gastrointestinal endoscopy (2013); Upper gastrointestinal endoscopy (13); Upper gastrointestinal endoscopy (14); Upper gastrointestinal endoscopy (10-6-14); Upper gastrointestinal endoscopy (1/20/15); Colonoscopy (2016); Upper gastrointestinal endoscopy (2016); Upper gastrointestinal endoscopy (2016); Upper gastrointestinal endoscopy (2017); pr esophagogastroduodenoscopy transoral diagnostic (N/A, 3/14/2017); Cataract removal (); Cholecystectomy (); Nose surgery; pr esophagogastroduodenoscopy transoral diagnostic (N/A, 2017); pr colon ca scrn not hi rsk ind (N/A, 2017); Upper gastrointestinal endoscopy (2017); Colonoscopy (2017); Upper gastrointestinal endoscopy (08/15/2018); pr esophagogastroduodenoscopy transoral diagnostic (N/A, 8/15/2018); Upper gastrointestinal endoscopy (2019);  Upper gastrointestinal endoscopy (N/A, Type of ROM/Therapeutic Exercise  Type of ROM/Therapeutic Exercise: Free weights  Comment: BUE therex to increase strength/endurance for ease of fxl tasks. 1# free weight x 10-15 reps x 3 planes while seated. Pt requried VC for encouragement and tech and increased time. Pt d/c'd [facility] through citing fatigue. Plan   Plan  Times per week: 7  Times per day: Daily  Current Treatment Recommendations: Strengthening, Balance Training, Functional Mobility Training, Endurance Training, Safety Education & Training, Patient/Caregiver Education & Training, Equipment Evaluation, Education, & procurement  G-Code     OutComes Score                                                  AM-PAC Score             Goals  Short term goals  Time Frame for Short term goals: 20 visits  Short term goal 1: Patient to complete self care routine c SUP to ensure safe return home. Short term goal 2: Patient to engage in 15 minutes of ther ex/ther act to improve strength and activity tolerance for self care. Short term goal 3: Patient to tolerate standing >7' while participating in functional task of choice to improve standing tolerane, balance and safety during ADL, mobility and transfers.        Therapy Time   Individual Concurrent Group Co-treatment   Time In 1100         Time Out 1123         Minutes 23                 OMAR Jimenez

## 2021-09-10 NOTE — PROGRESS NOTES
Physical Therapy  Facility/Department: [facility] AT THE [facility] MED SURG  Daily Treatment Note  NAME: Tc Parkinson  : 1960  MRN: [id number]    Date of Service: 9/10/2021    Discharge Recommendations:  Continue to assess pending progress        Assessment   Treatment Diagnosis: general weakness  Prognosis: Good  PT Education: Prince Duyen of Care  Patient Education: Educated pt on above and on importance of completing transfers/amb. Pt with poor to fair understanding. REQUIRES PT FOLLOW UP: Yes  Activity Tolerance  Activity Tolerance: Patient Tolerated treatment well;Patient limited by fatigue     Patient Diagnosis(es): The encounter diagnosis was Cellulitis of left leg.     has a past medical history of Cataracts, bilateral, Cirrhosis ([address] DM type 2 (diabetes mellitus, type 2) ([address] Esophageal varices without bleeding ([address] Essential hypertension, Hepatitis, Mental disability, Mixed hyperlipidemia, Portal hypertension ([address] and TB (tuberculosis). has a past surgical history that includes eye surgery; Nasal polyp surgery; Upper gastrointestinal endoscopy (2013); Upper gastrointestinal endoscopy (13); Upper gastrointestinal endoscopy (14); Upper gastrointestinal endoscopy (10-6-14); Upper gastrointestinal endoscopy (1/20/15); Colonoscopy (2016); Upper gastrointestinal endoscopy (2016); Upper gastrointestinal endoscopy (2016); Upper gastrointestinal endoscopy (2017); pr esophagogastroduodenoscopy transoral diagnostic (N/A, 3/14/2017); Cataract removal (); Cholecystectomy (); Nose surgery; pr esophagogastroduodenoscopy transoral diagnostic (N/A, 2017); pr colon ca scrn not hi rsk ind (N/A, 2017); Upper gastrointestinal endoscopy (2017); Colonoscopy (2017); Upper gastrointestinal endoscopy (08/15/2018); pr esophagogastroduodenoscopy transoral diagnostic (N/A, 8/15/2018); Upper gastrointestinal endoscopy (2019);  Upper gastrointestinal endoscopy (N/A, 3/5/2019); Upper gastrointestinal endoscopy (04/09/2019); Upper gastrointestinal endoscopy (N/A, 4/9/2019); Upper gastrointestinal endoscopy (N/A, 6/12/2019); Upper gastrointestinal endoscopy (N/A, 10/15/2019); Endoscopy, colon, diagnostic; Endoscopy, colon, diagnostic; Upper gastrointestinal endoscopy (N/A, 12/2/2019); Upper gastrointestinal endoscopy (N/A, 3/16/2020); Upper gastrointestinal endoscopy (07/27/2020); Upper gastrointestinal endoscopy (N/A, 7/27/2020); Upper gastrointestinal endoscopy (05/05/2021); and Upper gastrointestinal endoscopy (N/A, 5/5/2021). Restrictions  Restrictions/Precautions  Restrictions/Precautions: General Precautions, Fall Risk  Subjective   General  Chart Reviewed: Yes  Response To Previous Treatment: Patient with no complaints from previous session. Family / Caregiver Present: No  Referring Practitioner: Dr. Carly Dwyer: Pt denies any pain and stated that he wants to stay in bed to complete exercises since he may be leaving soon. Orientation  Orientation  Overall Orientation Status: Impaired  Orientation Level: Oriented to person  Cognition      Objective   Bed mobility  Supine to Sit: Unable to assess  Sit to Supine: Unable to assess  Comment: Pt refused d/t fatigue. Transfers  Sit to Stand: Unable to assess  Stand to sit: Unable to assess  Comment: Pt refused d/t fatigue. Ambulation  Ambulation?: No (Pt refused d/t fatigue and pending D/C.)        Exercises  Straight Leg Raise: 20x  Quad Sets: 20x  Heelslides: 20x  Hip Abduction: 20x  Knee Short Arc Quad: 20x  Ankle Pumps: 20x  Comments: Above exercises completed in sitting with AAROM as needed. Frequent cues for technique with fair understanding noted.       G-Code     OutComes Score    AM-PAC Score    Goals  Short term goals  Time Frame for Short term goals: 20 days  Short term goal 1: Pt will be educated on his POC  Short term goal 2: Pt will perform all transfers with

## 2021-09-10 NOTE — PROGRESS NOTES
Physical Therapy  Facility/Department: [facility] AT THE [facility] MED SURG  Daily Treatment Note  NAME: Blake Roldan  : 1960  MRN: [id number]    Date of Service: 9/10/2021    Discharge Recommendations:  Continue to assess pending progress        Assessment   Treatment Diagnosis: general weakness  Prognosis: Good  PT Education: Kt Courser of Care  Patient Education: Educated pt on above and on importance of completing transfers/amb. Pt with poor to fair understanding. REQUIRES PT FOLLOW UP: Yes  Activity Tolerance  Activity Tolerance: Patient Tolerated treatment well;Patient limited by fatigue     Patient Diagnosis(es): The encounter diagnosis was Cellulitis of left leg.     has a past medical history of Cataracts, bilateral, Cirrhosis ([facility] [address] DM type 2 (diabetes mellitus, type 2) ([facility] [address] Esophageal varices without bleeding ([facility] [address] Essential hypertension, Hepatitis, Mental disability, Mixed hyperlipidemia, Portal hypertension ([facility] [address] and TB (tuberculosis). has a past surgical history that includes eye surgery; Nasal polyp surgery; Upper gastrointestinal endoscopy (2013); Upper gastrointestinal endoscopy (13); Upper gastrointestinal endoscopy (14); Upper gastrointestinal endoscopy (10-6-14); Upper gastrointestinal endoscopy (1/20/15); Colonoscopy (2016); Upper gastrointestinal endoscopy (2016); Upper gastrointestinal endoscopy (2016); Upper gastrointestinal endoscopy (2017); pr esophagogastroduodenoscopy transoral diagnostic (N/A, 3/14/2017); Cataract removal (); Cholecystectomy (); Nose surgery; pr esophagogastroduodenoscopy transoral diagnostic (N/A, 2017); pr colon ca scrn not hi rsk ind (N/A, 2017); Upper gastrointestinal endoscopy (2017); Colonoscopy (2017); Upper gastrointestinal endoscopy (08/15/2018); pr esophagogastroduodenoscopy transoral diagnostic (N/A, 8/15/2018); Upper gastrointestinal endoscopy (2019);  Upper gastrointestinal transfers with supervision in order to increase independence  Short term goal 3: Pt will ambulate 100 feet with LRAD with SBA in order to return to PLOF  Short term goal 4: Pt will increase dynamic standing balance to Fair + in order to reduce fall risk    Plan    Plan  Times per week: 7x/wk  Times per day: Twice a day  Plan weeks: 2x/daily except weekends 1x/daily  Current Treatment Recommendations: Strengthening, Neuromuscular Re-education, Home Exercise Program, Manual Therapy - Soft Tissue Mobilization, Safety Education & Training, Balance Training, Endurance Training, Patient/Caregiver Education & Training, Functional Mobility Training, Manual Therapy - Joint Manipulation, Transfer Training, Gait Training, Stair training  Safety Devices  Type of devices: Bed alarm in place, All fall risk precautions in place, Call light within reach, Nurse notified, Patient at risk for falls, Left in bed     Therapy Time   Individual Concurrent Group Co-treatment   Time In 1012         Time Out 1037         Minutes [address] [place] [address]

## 2021-09-10 NOTE — PROGRESS NOTES
Progress Note    SUBJECTIVE:    Patient seen for f/u of left lower leg cellulitis. He resting in bed alert and oriented in no acute distress. Patient is up ambulating in room by self and tolerates well. He states his leg does feel better. He is afebrile. ROS:   Constitutional: negative  for fevers, and negative for chills. Respiratory: negative for shortness of breath, negative for cough, and negative for wheezing  Cardiovascular: negative for chest pain, and negative for palpitations  Gastrointestinal: negative for abdominal pain, negative for nausea,negative for vomiting, negative for diarrhea, and negative for constipation     All other systems were reviewed with the patient and are negative unless otherwise stated in HPI      OBJECTIVE:      Vitals:   Vitals:    09/10/21 0755   BP: 126/76   Pulse: 80   Resp: 16   Temp: 97.2 °F (36.2 °C)   SpO2: 95%     Weight: 204 lb (92.5 kg)   Height: 6' 5\" (195.6 cm)   -----------------------------------------------------------------  Exam:    GEN:  alert and oriented to person, place and time, in no acute distress, alert and with flat affect  EYES:  No gross abnormalities. , PERRL and EOMI  NECK: normal, supple, no lymphadenopathy,  no carotid bruits  PULM: clear to auscultation bilaterally- no wheezes, rales or rhonchi, normal air movement, no respiratory distress  COR:   regular rate & rhythm, no murmurs, no gallops, S1 normal and S2 normal  ABD:    soft, non-tender, non-distended, normal bowel sounds, no masses or organomegaly  EXT:    no cyanosis, clubbing or edema present    NEURO: follows commands, GUZMAN, no deficits  SKIN:   no rashes or significant lesions, chronic stasis changes to left lower extremity, no erythema or edema.    -----------------------------------------------------------------    Diagnostic Data:    · All available data reviewed  Lab Results   Component Value Date    WBC 1.4 (LL) 09/10/2021    HGB 7.7 (L) 09/10/2021    MCV 91.3 09/10/2021    PLT See Reflexed IPF Result 09/10/2021      Lab Results   Component Value Date    GLUCOSE 211 (H) 09/09/2021    BUN 21 09/09/2021    CREATININE 1.19 09/09/2021     09/09/2021    K 3.6 (L) 09/09/2021    CALCIUM 8.4 (L) 09/09/2021     09/09/2021    CO2 21 09/09/2021       No orders to display       ASSESSMENT / PLAN:  Left lower leg cellulitis  · Continue current therapy  · Continue IV Rocephin  · Continue PT OT  · Anemia due to cirrhosis of liver  · Patient is leukopenic, thrombocytopenia-currently sees hematology for this. Related to chronic liver disease. Patient will be set up to follow-up with them as an outpatient. · Type 2 diabetes  ? POCT before meals and at bedtime  ? Medium dose sliding scale  ? Hypoglycemia protocol  ?  Continue Glucotrol, Lantus, Glucophage  · DVT prophylaxis: SCD  · Peptic ulcer prophylaxis: Pepcid  · High risk medications: none  · Nutrition status: obesity, non-morbid: dietician consult initiated during hositalization  · Disposition:  Discharge plan is home today      Austin Alejo, APRN - CNP , APRN, NP-C

## 2021-09-10 NOTE — DISCHARGE SUMMARY
Discharge Summary    Cindy Caicedo  :  1960  MRN:  [id number]    Admit date:  2021      Discharge date: 9/10/2021     Admitting Physician:  Eugenia Huerta MD    Discharge Diagnoses: Active Problems:    Esophageal varices (HCC)    Hypertension    Type 2 diabetes mellitus without complication, without long-term current use of insulin (HCC)    Thrombocytopenia (HCC)    Cirrhosis of liver (HCC)    Portal hypertension (HCC)    Iron deficiency anemia    Cellulitis of left leg    Mild malnutrition (HCC)  Resolved Problems:    * No resolved hospital problems. *      Hospital Course:   Cindy Caicedo is a 57 y.o. male admitted with left lower leg cellulitis. He presented to the emergency room with complaints of left leg pain redness and swelling. Patient's caregiver was with him during the evaluation. Patient stated is been ongoing for a few days. He stated he was seen at [facility] and discharged after a few doses of Rocephin. He stated he had a significant work-up including a left venous Doppler and a CTA for PE which were both negative. Patient denied recent illness including fever chills. He denied chest pain or palpitations. He denied shortness of breath or cough. He denied nausea vomiting diarrhea. Patient was afebrile. Patient has a history of thrombocytopenia, esophageal varices, cirrhosis. Patient also has history of anemia as well as chronic kidney disease. Patient was found to have acute kidney injury and was placed on IV fluids as well as IV antibiotics. Patient's kidney function returned back to normal.  Patient tolerated IV Rocephin states his legs feel better. Patient does have leukopenia and thrombocytopenia. He does currently follow with hematology and is related to liver cirrhosis and varices. Patient had recent visit with them and was scheduled in 6 months. We scheduled him an appointment for  to follow-up sooner.   Patient will be discharged home today with Keflex orally. Consultants:  none    Procedures: none    Complications: none    Discharge Condition: fair    Exam:  GEN:  alert and oriented to person, place and time, in no acute distress, alert and with flat affect  EYES:  No gross abnormalities. , PERRL and EOMI  NECK: normal, supple, no lymphadenopathy,  no carotid bruits  PULM: clear to auscultation bilaterally- no wheezes, rales or rhonchi, normal air movement, no respiratory distress  COR:   regular rate & rhythm, no murmurs, no gallops, S1 normal and S2 normal  ABD:    soft, non-tender, non-distended, normal bowel sounds, no masses or organomegaly  EXT:    no cyanosis, clubbing or edema present    NEURO: follows commands, GUZMAN, no deficits  SKIN:   no rashes or significant lesions, chronic stasis changes to left lower extremity, no erythema or edema. Significant Diagnostic Studies:   Lab Results   Component Value Date    WBC 1.4 (LL) 09/10/2021    HGB 7.7 (L) 09/10/2021    PLT See Reflexed IPF Result 09/10/2021       Lab Results   Component Value Date    BUN 21 09/09/2021    CREATININE 1.19 09/09/2021     09/09/2021    K 3.6 (L) 09/09/2021    CALCIUM 8.4 (L) 09/09/2021     09/09/2021    CO2 21 09/09/2021    LABGLOM >60 09/09/2021       Lab Results   Component Value Date    LEUKOCYTESUR neg 01/07/2016    SPECGRAV 1.025 01/07/2016    GLUCOSEU >1,000 01/07/2016    KETUA 15 01/07/2016    PROTEINU neg 01/07/2016       No results found.     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    Th[facility] 2016    Type 2 diabetes mellitus without complication, without long-term current use of insulin (HCC)     Mixed hyperlipidemia     Candidal balanitis 2016    Hypertension     Esophageal varices ([facility] 2013        Discharge Medications:       Brenda    Home Medication Instructions MUJ:[id number]    Printed on:09/10/21 1212   Medication Information                      acetaminophen (MAPAP) 325 MG tablet  Take 2 tablets by mouth every 6 hours as needed for Pain             carbamide peroxide (DEBROX) 6.5 % otic solution  Place 4 drops into both ears 2 times daily Indications:  and Thursday in the evening             cephALEXin (KEFLEX) 500 MG capsule  Take 1 capsule by mouth 3 times daily for 10 days             chlorhexidine (PERIDEX) 0.12 % solution  USE 15 ML SWISH AND SPIT ONCE DAILY             clotrimazole-betamethasone (LOTRISONE) 1-0.05 % cream  Apply topically 2 times daily. PRN             dextran 70-hypromellose (GENTEAL TEARS) 0.1-0.3 % SOLN opthalmic solution  Place 1 drop into both eyes 3 times daily             Dimethicone (AVEENO DAILY MOISTURIZING) 1.25 % LOTN  APPLY TOPICALLY TO FEET ONCE WEEKLY             Disposable Gloves (LATEX GLOVES ONE SIZE) MISC  Use as directed.   Dispense 1 box             docusate sodium (COLACE) 100 MG capsule  Take 1 capsule by mouth 2 times daily             EPINEPHrine (EPIPEN) 0.3 MG/0.3ML SOAJ injection  Inject 1 pen intramuscularly as directed as needed for reaction to bee stings             ferrous gluconate 324 (37.5 Fe) MG TABS  Take 1 tablet by mouth daily             glipiZIDE (GLUCOTROL XL) 10 MG extended release tablet  Take 1 tablet by mouth 2 times daily             Hypromellose (GENTEAL MILD TO MODERATE OP)  Place 1 drop into both eyes 2 times daily             insulin glargine (BASAGLAR KWIKPEN) 100 UNIT/ML injection pen  Inject 35 Units into the skin every morning Changed to 35 units per dr Martha Butterfield             Insulin Pen Needle (BD ULTRA-FINE PEN NEEDLES) 29G X 12.7MM MISC  1 each by Does not apply route daily             lamoTRIgine (LAMICTAL) 100 MG tablet  Take 1 tablet by mouth daily             lisinopril (PRINIVIL;ZESTRIL) 2.5 MG tablet  Take 1 tablet by mouth daily             loratadine (CLARITIN) 10 MG tablet  Take 1 tablet by mouth daily             metFORMIN (GLUCOPHAGE-XR) 500 MG extended release tablet  GIVE 2 TABLETS (1000MG) BY MOUTH TWICE DAILY             metoprolol tartrate (LOPRESSOR) 25 MG tablet  Take 1 tablet by mouth daily             miconazole (MICOTIN) 2 % powder  Apply topically every morning Apply topically 2 times daily. Multiple Vitamin (DAILY-BEHZAD MULTIVITAMIN) TABS  Take 1 tablet by mouth daily             naproxen (NAPROSYN) 250 MG tablet  Take 250 mg by mouth as needed for Pain             NONFORMULARY  Sea salt nose spray 2 sprays each nostril daily             omeprazole (PRILOSEC) 20 MG delayed release capsule  Take 1 capsule by mouth Daily             ONE TOUCH ULTRASOFT LANCETS MISC  TEST BS DAILY AT 8AM AND 2 HOURS POST SUPPER WEEKLY ON SATURDAY . DX. E11.9             ONETOUCH ULTRA strip  USE AS DIRECTED 3 TIMES A DAY             risperiDONE (RISPERDAL) 0.25 MG tablet  Take 0.25 mg by mouth 2 times daily             simvastatin (ZOCOR) 20 MG tablet  Take 1 tablet by mouth daily             SM ANTACID ANTI--200-20 MG/5ML SUSP suspension  Take 30 mLs by mouth every 4 hours as needed for Indigestion (stomach acid or heartburn or gas)             sodium chloride (SALINE MIST) 0.65 % nasal spray  2 sprays by Nasal route daily             Throat Lozenges (HALLS COUGH DROPS MT)  Take 1 lozenge by mouth every 4 hours as needed. Patient Instructions:    Activity: activity as tolerated  Diet: diabetic diet  Wound Care: none needed  Other: None    Disposition:   Discharge to Home    Follow up:  Patient will be followed by Yari Preciado Raquel Bridges MD in 1-2 weeks; Dr. Ingrid Pena 9/23/2021    CORE MEASURES on Discharge (if applicable)  ACE/ARB in CHF: NA  Statin in MI: NA  ASA in MI: NA  Statin in CVA: NA  Antiplatelet in CVA: NA    Total time spent on discharge services: 40 minutes    Including the following activities:  Evaluation and Management of patient  Discussion with patient and/or surrogate about current care plan  Coordination with Case Management and/or   Coordination of care with Consultants (if applicable)   Coordination of care with Receiving Facility Physician (if applicable)  Completion of DME forms (if applicable)  Preparation of Discharge Summary  Preparation of Medication Reconciliation  Preparation of Discharge Prescriptions    Signed:  SUZANNE Baker - CNP, SUZANNE, NP-C  9/10/2021, 12:12 PM

## 2021-09-10 NOTE — PROGRESS NOTES
Writer to bedside to complete morning assessment. Upon entry to room, pt sitting up in bed, respirations even and unlabored while on room air. Dr Andrés Briceno and Ellie Ramsussen CNP at bedside. Vitals obtained and assessment completed, see flow sheet for details. Pt denies needs from writer at this time. Call light in reach. Will continue to monitor.

## 2021-09-12 NOTE — PROGRESS NOTES
Physician Progress Note      Hawk WONG #:                  [id number]  :                       1960  ADMIT DATE:       2021 7:34 PM  Eliza Rey DATE:        9/10/2021 5:15 PM  RESPONDING  PROVIDER #:        David Crum MD        QUERY TEXT:    Stage of Chronic Kidney Disease: Please provide further specificity, if known. Clinical indicators include: chronic kidney disease, bun, creatinine, probnp  Options provided:  -- Chronic kidney disease stage 1  -- Chronic kidney disease stage 2  -- Chronic kidney disease stage 3  -- Chronic kidney disease stage 3a  -- Chronic kidney disease stage 3b  -- Chronic kidney disease stage 4  -- Chronic kidney disease stage 5  -- Chronic kidney disease stage 5, requiring dialysis  -- End stage renal disease  -- Other - I will add my own diagnosis  -- Disagree - Not applicable / Not valid  -- Disagree - Clinically Unable to determine / Unknown        PROVIDER RESPONSE TEXT:    The patient has chronic kidney disease stage 2. QUERY TEXT:    Pt admitted with cellulitis left lower leg. Pt noted to have DM, hemoglobin   A1C 8.6. If possible, please document in progress notes and discharge summary the   relationship, if any, between cellulitis and DM. [Left lower leg  cellulitis associated with Diabetes[de-identified] Left lower leg   cellulitis associated with Diabetes.]]    The medical record reflects the following:  Risk Factors: DM, h/o  thrombocytopenia, esophageal varices, cirrhosis, CKD  Clinical Indicators: cellulitis;  21 Hemoglobin A1C 8.6;  glucose range   this admission: 107-218  Treatment: IV Rocephin, Lantus, Humalog    Raghu Dale RN, [address] [place] [address]   [phone]  .   Options provided:  -- Left lower leg cellulitis unrelated to Diabetes  -- Other - I will add my own diagnosis  -- Disagree - Not applicable / Not valid  -- Disagree - Clinically unable to determine / Unknown  -- Refer to Clinical Documentation Reviewer    PROVIDER RESPONSE TEXT:    Left lower leg cellulitis unrelated to Diabetes.     Query created by: Luke Lr on 9/10/2021 11:48 AM      Electronically signed by:  David Crum MD 9/12/2021 7:05 AM

## 2021-09-13 LAB
CULTURE: NORMAL
Lab: NORMAL
SPECIMEN DESCRIPTION: NORMAL

## 2021-09-20 PROBLEM — E10.620 NECROBIOSIS LIPOIDICA DIABETICORUM IN TYPE 1 DIABETES MELLITUS (HCC): Status: ACTIVE | Noted: 2021-09-20

## 2021-09-23 ENCOUNTER — OFFICE VISIT (OUTPATIENT)
Dept: ONCOLOGY | Age: 61
End: 2021-09-23
Payer: MEDICARE

## 2021-09-23 VITALS
HEART RATE: 80 BPM | DIASTOLIC BLOOD PRESSURE: 74 MMHG | BODY MASS INDEX: 24.43 KG/M2 | SYSTOLIC BLOOD PRESSURE: 127 MMHG | TEMPERATURE: 97 F | WEIGHT: 206 LBS

## 2021-09-23 DIAGNOSIS — D61.818 PANCYTOPENIA (HCC): Primary | ICD-10-CM

## 2021-09-23 DIAGNOSIS — D50.9 IRON DEFICIENCY ANEMIA, UNSPECIFIED IRON DEFICIENCY ANEMIA TYPE: ICD-10-CM

## 2021-09-23 DIAGNOSIS — E10.620: ICD-10-CM

## 2021-09-23 PROCEDURE — G8427 DOCREV CUR MEDS BY ELIG CLIN: HCPCS | Performed by: INTERNAL MEDICINE

## 2021-09-23 PROCEDURE — 1111F DSCHRG MED/CURRENT MED MERGE: CPT | Performed by: INTERNAL MEDICINE

## 2021-09-23 PROCEDURE — 99211 OFF/OP EST MAY X REQ PHY/QHP: CPT

## 2021-09-23 PROCEDURE — 3017F COLORECTAL CA SCREEN DOC REV: CPT | Performed by: INTERNAL MEDICINE

## 2021-09-23 PROCEDURE — 1036F TOBACCO NON-USER: CPT | Performed by: INTERNAL MEDICINE

## 2021-09-23 PROCEDURE — 99214 OFFICE O/P EST MOD 30 MIN: CPT | Performed by: INTERNAL MEDICINE

## 2021-09-23 PROCEDURE — G8420 CALC BMI NORM PARAMETERS: HCPCS | Performed by: INTERNAL MEDICINE

## 2021-09-23 PROCEDURE — 2022F DILAT RTA XM EVC RTNOPTHY: CPT | Performed by: INTERNAL MEDICINE

## 2021-09-23 PROCEDURE — 3052F HG A1C>EQUAL 8.0%<EQUAL 9.0%: CPT | Performed by: INTERNAL MEDICINE

## 2021-09-23 NOTE — PROGRESS NOTES
Chief Complaint   Patient presents with    Follow-up     follow up from hospital stay for cellulitis, chronic anemia, pt states having trouble sleeping. DIAGNOSIS:   1. Thrombocytopenia related to liver disease  2. Iron deficiency anemia, corrected with oral iron  CURRENT THERAPY:  Oral iron  Observation for thrombocytopenia  BRIEF CASE HISTORY:   Rubens More is a very pleasant 57 y.o. male who was by Dr. Amarilis Chua due to anemia and thrombocytopenia. Work-up suggested cirrhosis and portal hypertension with esophageal varices. Patient was also found to have iron deficiency and was started on oral iron. INTERIM HISTORY:  The patient comes in today for a follow up, he feels well and has no complaints  He denies any bleeding or bruising. He stays in the group home. Overall condition has been unchanged. Labs will be repeated soon. PAST MEDICAL HISTORY: has a past medical history of Cataracts, bilateral, Cirrhosis ([address] DM type 2 (diabetes mellitus, type 2) ([address] Esophageal varices without bleeding ([address] Essential hypertension, Hepatitis, Mental disability, Mixed hyperlipidemia, Portal hypertension ([address] and TB (tuberculosis). PAST SURGICAL HISTORY: has a past surgical history that includes eye surgery; Nasal polyp surgery; Upper gastrointestinal endoscopy (08-); Upper gastrointestinal endoscopy (11/4/13); Upper gastrointestinal endoscopy (6/23/14); Upper gastrointestinal endoscopy (10-6-14); Upper gastrointestinal endoscopy (1/20/15); Colonoscopy (03/21/2016); Upper gastrointestinal endoscopy (03/21/2016); Upper gastrointestinal endoscopy (09/19/2016); Upper gastrointestinal endoscopy (03/14/2017); pr esophagogastroduodenoscopy transoral diagnostic (N/A, 3/14/2017); Cataract removal (1977); Cholecystectomy (2007); Nose surgery; pr esophagogastroduodenoscopy transoral diagnostic (N/A, 11/29/2017); pr colon ca scrn not hi rsk ind (N/A, 11/29/2017);  Upper gastrointestinal endoscopy (11/29/2017); Colonoscopy (11/29/2017); Upper gastrointestinal endoscopy (08/15/2018); pr esophagogastroduodenoscopy transoral diagnostic (N/A, 8/15/2018); Upper gastrointestinal endoscopy (03/05/2019); Upper gastrointestinal endoscopy (N/A, 3/5/2019); Upper gastrointestinal endoscopy (04/09/2019); Upper gastrointestinal endoscopy (N/A, 4/9/2019); Upper gastrointestinal endoscopy (N/A, 6/12/2019); Upper gastrointestinal endoscopy (N/A, 10/15/2019); Endoscopy, colon, diagnostic; Endoscopy, colon, diagnostic; Upper gastrointestinal endoscopy (N/A, 12/2/2019); Upper gastrointestinal endoscopy (N/A, 3/16/2020); Upper gastrointestinal endoscopy (07/27/2020); Upper gastrointestinal endoscopy (N/A, 7/27/2020); Upper gastrointestinal endoscopy (05/05/2021); and Upper gastrointestinal endoscopy (N/A, 5/5/2021). CURRENT MEDICATIONS:  has a current medication list which includes the following prescription(s): loratadine, basaglar kwikpen, risperidone, ferrous gluconate, daily-rajeev multivitamin, glipizide, docusate sodium, metformin, omeprazole, chlorhexidine, sm antacid anti-gas, naproxen, simvastatin, metoprolol tartrate, lisinopril, epinephrine, sodium chloride, clotrimazole-betamethasone, carbamide peroxide, acetaminophen, dextran 70-hypromellose, lamotrigine, dimethicone, miconazole, NONFORMULARY, menthol, bd ultra-fine pen needles, onetouch ultra, one touch ultrasoft lancets, hypromellose, and latex gloves one size. ALLERGIES:  is allergic to bee venom. FAMILY HISTORY: Negative for any hematological or oncological conditions. SOCIAL HISTORY:  reports that he has never smoked. He has never used smokeless tobacco. He reports that he does not drink alcohol and does not use drugs. REVIEW OF SYSTEMS:  General: no fever or night sweats, Weight is stable.   ENT: No double or blurred vision, no tinnitus or hearing problem, no dysphagia or sore throat   Respiratory: No chest pain, no shortness of breath, no cough or hemoptysis. Cardiovascular: Denies chest pain, PND or orthopnea. No L E swelling or palpitations. Gastrointestinal:    No nausea or vomiting, abdominal pain, diarrhea or constipation. Genitourinary: Denies dysuria, hematuria, frequency, urgency or incontinence. Neurological: Denies headaches, decreased LOC, no sensory or motor focal deficits. Musculoskeletal:  No arthralgia no back pain or joint swelling. Skin: There are no rashes or bleeding. Itching in the groin area as discussed  Psychiatric:  No anxiety, no depression. Endocrine: no diabetes or thyroid disease. Hematologic: no bleeding , no adenopathy. PHYSICAL EXAM: Shows a well appearing 57y.o.-year-old male who is not in pain or distress. Vital Signs: Blood pressure 127/74, pulse 80, temperature 97 °F (36.1 °C), temperature source Temporal, weight 206 lb (93.4 kg). HEENT: Normocephalic and atraumatic. Pupils are equal, round, reactive to light and accommodation. Extraocular muscles are intact. Neck: Showed no JVD, no carotid bruit . Lungs: Clear to auscultation bilaterally. Heart: Regular without any murmur. Abdomen: Soft, nontender, distended, I could feel the liver edge of the liver at the costal margin extremities: Lower extremities show no edema, clubbing, or cyanosis. Breasts: Examination not done today.  Neuro exam: intact cranial nerves bilaterally no motor or sensory deficit, gait is normal. Lymphatic: no adenopathy appreciated in the supraclavicular, axillary, cervical or inguinal area  Examination of the groin area showed tinea cruris  Review of radiological studies:     Review of laboratory data:    Iron and TIBC (11/28/2020 9:21 AM EST)  Iron and TIBC (11/28/2020 9:21 AM EST)   Component Value Ref Range Performed At Pathologist Signature   Iron 104 50 - 212 ug/dL [facility]     Tibc-calc only do not order 412 250 - 425 ug/dL [facility]     Iron Saturation 25 20 - 50 % SATURATION [place] [facility]       Iron and TIBC (11/28/2020 9:21 AM EST)   Specimen   Serum     Iron and TIBC (11/28/2020 9:21 AM EST)   Performing Organization Address City/State/ZIP Code Phone Number   [email or web address]       [facility]   [address], [address]       Back to top of Lab Results       Ferritin (11/28/2020 9:21 AM EST)  Ferritin (11/28/2020 9:21 AM EST)   Component Value Ref Range Performed At Pathologist Signature   Ferritin 21 (L) 24 - 336 ng/mL [facility]       Ferritin (11/28/2020 9:21 AM EST)   Specimen   Serum     Ferritin (11/28/2020 9:21 AM EST)   Performing Organization Address City/State/ZIP Code Phone Number   [place]      [facility]   [address], [address]       Back to top of Lab Results       Comprehensive metabolic panel (97/31/5360 9:21 AM EST)  Comprehensive metabolic panel (39/98/4013 9:21 AM EST)   Component Value Ref Range Performed At Pathologist [facility]   Sodium 140 134 - 146 mmol/L [facility]     Potassium, Bld 3.9 3.5 - 5.0 mmol/L [facility]     Chloride 104 98 - 109 mmol/L [facility]     CO2 27 22 - 32 mmol/L [facility]     Anion gap 9 5 - 15 mmol/L [facility]     BUN 20 5 - 27 mg/dL [facility]     Creatinine 1.09Comment: METHOD TRACEABLE TO IDMS STANDARD 0.60 - 1.30 mg/dL [facility]     Glucose 190 (H) 65 - 99 mg/dL [facility]     Calcium 9.0 8.5 - 10.5 mg/dL [facility]     Total Protein 7.2 6.0 - 8.0 g/dL [facility]     Albumin 3.9 3.2 - 5.3 g/dL [facility]     Alkaline Phosphatase 96 39 - 130 U/L [facility]     AST 19 0 - 41 U/L [facility]     ALT 20 0 - 40 U/L [facility]     Total bilirubin 1.3 (H) 0.3 - 1.2 mg/dL [facility]     GFR MDRD Non Af Amer >60 >59 ml/min/1.73sq.m [facility]     GFR MDRD Af Amer >60 >59 ml/min/1.73sq.m [facility]       Comprehensive metabolic panel (86/54/7376 9:21 AM EST)   Specimen   Serum     Comprehensive metabolic panel (25/58/8364 9:21 AM EST)   Performing Organization Address City/State/ZIP Code Phone Number   [email or web address]       [facility]   [address], [place] [address]        Back to top of Lab Results       CBC auto differential (11/28/2020 9:21 AM EST)  CBC auto differential (11/28/2020 9:21 AM EST)   Component Value Ref Range Performed At Pathologist Signature   White Blood Cells 4.0 4.0 - 11.0 [address] [facility]     RBC count 3.77 (L) 4.10 - 5.70 X10E12/L [facility]     Hemoglobin 11.9 (L) 13.0 - 17.0 g/dL [facility]     Hematocrit 35.0 (L) 39 - 49 % [facility]     MCV 93 80 - 100 fL [facility]     MCH 31.6 27 - 34 pg [facility]     MCHC 34.0 32 - 36 g/dL [facility]     RDW 13.8 11.5 - 15.0 % [facility]     Platelets 68 (L) 571 - 7353 [facility]     MPV 9.5 7 - 12 fL [facility]     % neutrophils 64.5 % [facility]     % lymphocytes 20.6 % [facility]     % monocytes 12.7 % [facility]     % eosinophils 1.5 % [facility]     % Basophils 0.7 % [facility]     Neutrophils Absolute (A) 2.6 1.5 - 6.6 X10E9/L [facility]     Lymphocytes Absolute 0.8 (L) 1.0 - 3.5 [address] [address] [facility]     Monocytes Absolute 0.5 0 - 0.9 [address] [address] LAB     Eosinophils Absolute 0.1 0.0 - 0.4 [address] [address] LAB     Basophils Absolute 0.0 0.0 - 0.2 [address]955 [address] LAB       CBC auto differential (11/28/2020 9:21 AM EST)   Specimen           IMPRESSION:   1. Thrombocytopenia related to liver disease, platelets are stable  2. Patient is relatively symptomatic from his thrombocytopenia  3. Anemia, possibly related to liver disease. With concomitant iron deficiency  Plan:   The patient platelets and liver function are stable. We will continue current therapy without changes. Continue oral iron. We will see him back in 6 months for follow-up. 6 [facility]                            This note is created with the assistance of a speech recognition program.  While intending to generate a document that actually reflects the content of the visit, the document can still have some errors including those of syntax and sound a like substitutions which may escape proof reading. It such instances, actual meaning can be extrapolated by contextual diversion.

## 2022-01-21 DIAGNOSIS — D50.9 IRON DEFICIENCY ANEMIA, UNSPECIFIED IRON DEFICIENCY ANEMIA TYPE: ICD-10-CM

## 2022-01-21 DIAGNOSIS — K90.9 INTESTINAL MALABSORPTION, UNSPECIFIED TYPE: ICD-10-CM

## 2022-01-21 RX ORDER — FERROUS GLUCONATE 324(37.5)
324 TABLET ORAL DAILY
Qty: 30 TABLET | Refills: 5 | Status: SHIPPED | OUTPATIENT
Start: 2022-01-21 | End: 2022-07-14 | Stop reason: SDUPTHER

## 2022-03-30 ENCOUNTER — OFFICE VISIT (OUTPATIENT)
Dept: ONCOLOGY | Age: 62
End: 2022-03-30
Payer: MEDICARE

## 2022-03-30 VITALS
WEIGHT: 215 LBS | HEART RATE: 92 BPM | TEMPERATURE: 97.8 F | RESPIRATION RATE: 18 BRPM | SYSTOLIC BLOOD PRESSURE: 138 MMHG | BODY MASS INDEX: 25.5 KG/M2 | DIASTOLIC BLOOD PRESSURE: 66 MMHG

## 2022-03-30 DIAGNOSIS — D69.6 THROMBOCYTOPENIA (HCC): ICD-10-CM

## 2022-03-30 DIAGNOSIS — D50.9 IRON DEFICIENCY ANEMIA, UNSPECIFIED IRON DEFICIENCY ANEMIA TYPE: ICD-10-CM

## 2022-03-30 DIAGNOSIS — D50.9 IRON DEFICIENCY ANEMIA, UNSPECIFIED IRON DEFICIENCY ANEMIA TYPE: Primary | ICD-10-CM

## 2022-03-30 DIAGNOSIS — E10.620: ICD-10-CM

## 2022-03-30 DIAGNOSIS — D61.818 PANCYTOPENIA (HCC): ICD-10-CM

## 2022-03-30 PROCEDURE — 99214 OFFICE O/P EST MOD 30 MIN: CPT | Performed by: INTERNAL MEDICINE

## 2022-03-30 PROCEDURE — G8427 DOCREV CUR MEDS BY ELIG CLIN: HCPCS | Performed by: INTERNAL MEDICINE

## 2022-03-30 PROCEDURE — G8482 FLU IMMUNIZE ORDER/ADMIN: HCPCS | Performed by: INTERNAL MEDICINE

## 2022-03-30 PROCEDURE — 99211 OFF/OP EST MAY X REQ PHY/QHP: CPT

## 2022-03-30 PROCEDURE — 3017F COLORECTAL CA SCREEN DOC REV: CPT | Performed by: INTERNAL MEDICINE

## 2022-03-30 PROCEDURE — 1036F TOBACCO NON-USER: CPT | Performed by: INTERNAL MEDICINE

## 2022-03-30 PROCEDURE — G8419 CALC BMI OUT NRM PARAM NOF/U: HCPCS | Performed by: INTERNAL MEDICINE

## 2022-03-31 RX ORDER — CLOTRIMAZOLE AND BETAMETHASONE DIPROPIONATE 10; .64 MG/G; MG/G
CREAM TOPICAL
Refills: 0 | OUTPATIENT
Start: 2022-03-31

## 2022-04-03 NOTE — PROGRESS NOTES
Chief Complaint   Patient presents with    Follow-up     thrombocytopenia anemia        DIAGNOSIS:   1. Thrombocytopenia related to liver disease  2. Iron deficiency anemia, corrected with oral iron  CURRENT THERAPY:  Oral iron  Observation for thrombocytopenia  BRIEF CASE HISTORY:   Dion Dolan is a very pleasant 60 y.o. male who was by Dr. James Zapien due to anemia and thrombocytopenia. Work-up suggested cirrhosis and portal hypertension with esophageal varices. Patient was also found to have iron deficiency and was started on oral iron. INTERIM HISTORY:  The patient comes in today for a follow up, he feels well and has no complaints  He denies any bleeding or bruising. He stays in the group home. Overall condition has been unchanged. PAST MEDICAL HISTORY: has a past medical history of Cirrhosis ([address] DM type 2 (diabetes mellitus, type 2) ([facility] [address] Esophageal varices without bleeding ([facility] [address] Essential hypertension, Hepatitis, Mental disability, Mixed hyperlipidemia, Portal hypertension ([address] and TB (tuberculosis). PAST SURGICAL HISTORY: has a past surgical history that includes eye surgery; Nasal polyp surgery; Upper gastrointestinal endoscopy (08-); Upper gastrointestinal endoscopy (11/4/13); Upper gastrointestinal endoscopy (6/23/14); Upper gastrointestinal endoscopy (10-6-14); Upper gastrointestinal endoscopy (1/20/15); Colonoscopy (03/21/2016); Upper gastrointestinal endoscopy (03/21/2016); Upper gastrointestinal endoscopy (09/19/2016); Upper gastrointestinal endoscopy (03/14/2017); pr esophagogastroduodenoscopy transoral diagnostic (N/A, 3/14/2017); Cataract removal (1977); Cholecystectomy (2007); Nose surgery; pr esophagogastroduodenoscopy transoral diagnostic (N/A, 11/29/2017); pr colon ca scrn not hi rsk ind (N/A, 11/29/2017); Upper gastrointestinal endoscopy (11/29/2017); Colonoscopy (11/29/2017);  Upper gastrointestinal endoscopy (08/15/2018); pr esophagogastroduodenoscopy transoral diagnostic (N/A, 8/15/2018); Upper gastrointestinal endoscopy (03/05/2019); Upper gastrointestinal endoscopy (N/A, 3/5/2019); Upper gastrointestinal endoscopy (04/09/2019); Upper gastrointestinal endoscopy (N/A, 4/9/2019); Upper gastrointestinal endoscopy (N/A, 6/12/2019); Upper gastrointestinal endoscopy (N/A, 10/15/2019); Endoscopy, colon, diagnostic; Endoscopy, colon, diagnostic; Upper gastrointestinal endoscopy (N/A, 12/2/2019); Upper gastrointestinal endoscopy (N/A, 3/16/2020); Upper gastrointestinal endoscopy (07/27/2020); Upper gastrointestinal endoscopy (N/A, 7/27/2020); Upper gastrointestinal endoscopy (05/05/2021); and Upper gastrointestinal endoscopy (N/A, 5/5/2021). CURRENT MEDICATIONS:  has a current medication list which includes the following prescription(s): simvastatin, sodium chloride, loratadine, omeprazole, debrox, ferrous gluconate, lisinopril, metoprolol tartrate, glipizide, daily-rajeev multivitamin, docusate sodium, metformin, basaglar kwikpen, onetouch ultra, acetaminophen, aveeno daily moisturizing, risperidone, chlorhexidine, sm antacid anti-gas, bd ultra-fine pen needles, one touch ultrasoft lancets, dextran 70-hypromellose, lamotrigine, hypromellose, miconazole, latex gloves one size, NONFORMULARY, menthol, and epinephrine. ALLERGIES:  is allergic to bee venom. FAMILY HISTORY: Negative for any hematological or oncological conditions. SOCIAL HISTORY:  reports that he has never smoked. He has never used smokeless tobacco. He reports that he does not drink alcohol and does not use drugs. REVIEW OF SYSTEMS:  General: no fever or night sweats, Weight is stable. ENT: No double or blurred vision, no tinnitus or hearing problem, no dysphagia or sore throat   Respiratory: No chest pain, no shortness of breath, no cough or hemoptysis. Cardiovascular: Denies chest pain, PND or orthopnea. No L E swelling or palpitations.    Gastrointestinal:    No nausea or vomiting, abdominal pain, diarrhea or constipation. Genitourinary: Denies dysuria, hematuria, frequency, urgency or incontinence. Neurological: Denies headaches, decreased LOC, no sensory or motor focal deficits. Musculoskeletal:  No arthralgia no back pain or joint swelling. Skin: There are no rashes or bleeding. Itching in the groin area as discussed  Psychiatric:  No anxiety, no depression. Endocrine: no diabetes or thyroid disease. Hematologic: no bleeding , no adenopathy. PHYSICAL EXAM: Shows a well appearing 60y.o.-year-old male who is not in pain or distress. Vital Signs: Blood pressure 138/66, pulse 92, temperature 97.8 °F (36.6 °C), temperature source Temporal, resp. rate 18, weight 215 lb (97.5 kg). HEENT: Normocephalic and atraumatic. Pupils are equal, round, reactive to light and accommodation. Extraocular muscles are intact. Neck: Showed no JVD, no carotid bruit . Lungs: Clear to auscultation bilaterally. Heart: Regular without any murmur. Abdomen: Soft, nontender, distended, I could feel the liver edge of the liver at the costal margin extremities: Lower extremities show no edema, clubbing, or cyanosis. Breasts: Examination not done today.  Neuro exam: intact cranial nerves bilaterally no motor or sensory deficit, gait is normal. Lymphatic: no adenopathy appreciated in the supraclavicular, axillary, cervical or inguinal area  Examination of the groin area showed tinea cruris  Review of radiological studies:     Review of laboratory data:    Iron and TIBC (11/28/2020 9:21 AM EST)  Iron and TIBC (11/28/2020 9:21 AM EST)   Component Value Ref Range Performed At Pathologist Signature   Iron 104 50 - 212 ug/dL [facility]     [facility]-calc only do not order 412 250 - 425 ug/dL [facility] LAB     Iron Saturation 25 20 - 50 % [address] [facility]       Iron and TIBC (11/28/2020 9:21 AM EST)   Specimen   Serum     Iron and TIBC (11/28/2020 9:21 AM EST)   Performing Organization Address City/State/ZIP Code Phone Number   YULIANA [address]  [address] [place], [address] [address]       Back to top of Lab Results       Ferritin (11/28/2020 9:21 AM EST)  Ferritin (11/28/2020 9:21 AM EST)   Component Value Ref Range Performed At Pathologist Signature   Ferritin 21 (L) 24 - 336 ng/mL [facility]       Ferritin (11/28/2020 9:21 AM EST)   Specimen   Serum     Ferritin (11/28/2020 9:21 AM EST)   Performing Organization Address City/State/ZIP Code Phone Number   [place]      [facility]   [address] [facility], [address]       Back to top of Lab Results       Comprehensive metabolic panel (55/85/5334 9:21 AM EST)  Comprehensive metabolic panel (35/55/7393 9:21 AM EST)   Component Value Ref Range Performed At Pathologist Signature   Sodium 140 134 - 146 mmol/L [facility] LAB     Potassium, Bld 3.9 3.5 - 5.0 mmol/L [facility]     Chloride 104 98 - 109 mmol/L [facility]     CO2 27 22 - 32 mmol/L [facility]     Anion gap 9 5 - 15 mmol/L [facility]     BUN 20 5 - 27 mg/dL [facility] LAB     Creatinine 1.09Comment: METHOD TRACEABLE TO IDMS STANDARD 0.60 - 1.30 mg/dL [facility]     Glucose 190 (H) 65 - 99 mg/dL [facility]     Calcium 9.0 8.5 - 10.5 mg/dL [facility]     Total Protein 7.2 6.0 - 8.0 g/dL [facility]     Albumin 3.9 3.2 - 5.3 g/dL [facility]     Alkaline Phosphatase 96 39 - 130 U/L [facility]     AST 19 0 - 41 U/L [facility]     ALT 20 0 - 40 U/L [facility]     Total bilirubin 1.3 (H) 0.3 - 1.2 mg/dL [facility]     GFR MDRD Non Af Amer >60 >59 ml/min/1.73sq.m [facility] LAB     GFR MDRD Af Amer >60 >59 ml/min/1.73sq. m [facility]       Comprehensive metabolic panel (76/73/8055 9:21 AM EST)   Specimen   Serum     Comprehensive metabolic panel (67/44/4605 9:21 AM EST)   Performing Organization Address City/State/ZIP Code Phone Number   [facility]       [facility]   [address] [place], [place] [address]        Back to top of Lab Results       CBC auto differential (11/28/2020 9:21 AM EST)  CBC auto differential (11/28/2020 9:21 AM EST)   Component Value Ref Range Performed At Pathologist Signature   White Blood Cells 4.0 4.0 - 11.0 [address] LAB     RBC count 3.77 (L) 4.10 - 5.70 X10E12/L [facility]     Hemoglobin 11.9 (L) 13.0 - 17.0 g/dL [facility]     Hematocrit 35.0 (L) 39 - 49 % [facility]     MCV 93 80 - 100 fL [facility]     MCH 31.6 27 - 34 pg [facility]     MCHC 34.0 32 - 36 g/dL [facility]     RDW 13.8 11.5 - 15.0 % [facility]     Platelets 68 (L) 900 - 7353 [facility]     MPV 9.5 7 - 12 fL [facility]     % neutrophils 64.5 % [facility]     % lymphocytes 20.6 % [facility]     % monocytes 12.7 % [facility]     % eosinophils 1.5 % [facility]     % Basophils 0.7 % [facility]     Neutrophils Absolute (A) 2.6 1.5 - 6.6 X10E9/L [facility]     Lymphocytes Absolute 0.8 (L) 1.0 - 3.5 [address] [address] LAB     Monocytes Absolute 0.5 0 - 0.9 [address] [address] LAB     Eosinophils Absolute 0.1 0.0 - 0.4 [address] [address] LAB     Basophils Absolute 0.0 0.0 - 0.2 [facility] [facility]       CBC auto differential (11/28/2020 9:21 AM EST)   Specimen           IMPRESSION:   1.  Thrombocytopenia related to liver disease, platelets are stable  2. Patient is relatively symptomatic from his thrombocytopenia  3. Anemia, possibly related to liver disease. With concomitant iron deficiency  Plan:   The patient platelets and liver function are stable. We will continue current therapy without changes. Continue oral iron. We will see him back in 6 months for follow-up. 6 [facility]                            This note is created with the assistance of a speech recognition program.  While intending to generate a document that actually reflects the content of the visit, the document can still have some errors including those of syntax and sound a like substitutions which may escape proof reading. It such instances, actual meaning can be extrapolated by contextual diversion.

## 2022-07-14 ENCOUNTER — TELEPHONE (OUTPATIENT)
Dept: ONCOLOGY | Age: 62
End: 2022-07-14

## 2022-07-14 DIAGNOSIS — K90.9 INTESTINAL MALABSORPTION, UNSPECIFIED TYPE: ICD-10-CM

## 2022-07-14 DIAGNOSIS — D50.9 IRON DEFICIENCY ANEMIA, UNSPECIFIED IRON DEFICIENCY ANEMIA TYPE: ICD-10-CM

## 2022-07-14 RX ORDER — FERROUS GLUCONATE 324(37.5)
324 TABLET ORAL DAILY
Qty: 30 TABLET | Refills: 5 | Status: SHIPPED | OUTPATIENT
Start: 2022-07-14

## 2022-07-22 PROBLEM — E44.1 MILD MALNUTRITION (HCC): Status: RESOLVED | Noted: 2021-09-09 | Resolved: 2022-07-22

## 2022-09-27 PROBLEM — K52.1 DIARRHEA DUE TO DRUG: Status: ACTIVE | Noted: 2022-09-27

## 2022-10-12 ENCOUNTER — OFFICE VISIT (OUTPATIENT)
Dept: ONCOLOGY | Age: 62
End: 2022-10-12
Payer: MEDICARE

## 2022-10-12 VITALS
HEART RATE: 72 BPM | SYSTOLIC BLOOD PRESSURE: 121 MMHG | WEIGHT: 216.6 LBS | DIASTOLIC BLOOD PRESSURE: 50 MMHG | BODY MASS INDEX: 25.68 KG/M2 | RESPIRATION RATE: 16 BRPM | TEMPERATURE: 97.4 F

## 2022-10-12 DIAGNOSIS — D69.6 THROMBOCYTOPENIA (HCC): Chronic | ICD-10-CM

## 2022-10-12 DIAGNOSIS — D61.818 PANCYTOPENIA (HCC): Primary | ICD-10-CM

## 2022-10-12 DIAGNOSIS — D50.9 IRON DEFICIENCY ANEMIA, UNSPECIFIED IRON DEFICIENCY ANEMIA TYPE: ICD-10-CM

## 2022-10-12 PROCEDURE — 1036F TOBACCO NON-USER: CPT | Performed by: INTERNAL MEDICINE

## 2022-10-12 PROCEDURE — G8482 FLU IMMUNIZE ORDER/ADMIN: HCPCS | Performed by: INTERNAL MEDICINE

## 2022-10-12 PROCEDURE — G8419 CALC BMI OUT NRM PARAM NOF/U: HCPCS | Performed by: INTERNAL MEDICINE

## 2022-10-12 PROCEDURE — 99214 OFFICE O/P EST MOD 30 MIN: CPT | Performed by: INTERNAL MEDICINE

## 2022-10-12 PROCEDURE — 99211 OFF/OP EST MAY X REQ PHY/QHP: CPT | Performed by: INTERNAL MEDICINE

## 2022-10-12 PROCEDURE — 3017F COLORECTAL CA SCREEN DOC REV: CPT | Performed by: INTERNAL MEDICINE

## 2022-10-12 PROCEDURE — G8427 DOCREV CUR MEDS BY ELIG CLIN: HCPCS | Performed by: INTERNAL MEDICINE

## 2022-10-12 NOTE — PROGRESS NOTES
Chief Complaint   Patient presents with    Follow-up    Fatigue     Sleeps a lot     Other     Slips meals, wt unchanged        DIAGNOSIS:   Thrombocytopenia related to liver disease  Iron deficiency anemia, corrected with oral iron  CURRENT THERAPY:  Oral iron  Observation for thrombocytopenia  BRIEF CASE HISTORY:   Sacha Ruvalcaba is a very pleasant 60 y.o. male who was by Dr. Rodolfo Lopez due to anemia and thrombocytopenia. Work-up suggested cirrhosis and portal hypertension with esophageal varices. Patient was also found to have iron deficiency and was started on oral iron. INTERIM HISTORY:  The patient comes in today for a follow up, he feels well and has no complaints  He denies any bleeding or bruising. He stays in the group home. Overall condition has been unchanged. PAST MEDICAL HISTORY: has a past medical history of Cirrhosis ([address] DM type 2 (diabetes mellitus, type 2) ([facility] [address] Esophageal varices without bleeding ([address] Essential hypertension, Hepatitis, Mental disability, Mixed hyperlipidemia, Portal hypertension ([address] and TB (tuberculosis). PAST SURGICAL HISTORY: has a past surgical history that includes eye surgery; Nasal polyp surgery; Upper gastrointestinal endoscopy (08-); Upper gastrointestinal endoscopy (11/4/13); Upper gastrointestinal endoscopy (6/23/14); Upper gastrointestinal endoscopy (10-6-14); Upper gastrointestinal endoscopy (1/20/15); Colonoscopy (03/21/2016); Upper gastrointestinal endoscopy (03/21/2016); Upper gastrointestinal endoscopy (09/19/2016); Upper gastrointestinal endoscopy (03/14/2017); pr esophagogastroduodenoscopy transoral diagnostic (N/A, 3/14/2017); Cataract removal (1977); Cholecystectomy (2007); Nose surgery; pr esophagogastroduodenoscopy transoral diagnostic (N/A, 11/29/2017); pr colon ca scrn not hi rsk ind (N/A, 11/29/2017); Upper gastrointestinal endoscopy (11/29/2017); Colonoscopy (11/29/2017);  Upper gastrointestinal endoscopy (08/15/2018); pr esophagogastroduodenoscopy transoral diagnostic (N/A, 8/15/2018); Upper gastrointestinal endoscopy (03/05/2019); Upper gastrointestinal endoscopy (N/A, 3/5/2019); Upper gastrointestinal endoscopy (04/09/2019); Upper gastrointestinal endoscopy (N/A, 4/9/2019); Upper gastrointestinal endoscopy (N/A, 6/12/2019); Upper gastrointestinal endoscopy (N/A, 10/15/2019); Endoscopy, colon, diagnostic; Endoscopy, colon, diagnostic; Upper gastrointestinal endoscopy (N/A, 12/2/2019); Upper gastrointestinal endoscopy (N/A, 3/16/2020); Upper gastrointestinal endoscopy (07/27/2020); Upper gastrointestinal endoscopy (N/A, 7/27/2020); Upper gastrointestinal endoscopy (05/05/2021); and Upper gastrointestinal endoscopy (N/A, 5/5/2021). CURRENT MEDICATIONS:  has a current medication list which includes the following prescription(s): omeprazole, insulin lispro (1 unit dial), sm antacid advanced, basaglar kwikpen, glimepiride, metoprolol tartrate, loratadine, lisinopril, simvastatin, ferrous gluconate, bd ultra-fine pen needles, bd ultra-fine pen needles, chlorhexidine, sodium chloride, debrox, daily-rajeev multivitamin, onetouch ultra, acetaminophen, aveeno daily moisturizing, risperidone, one touch ultrasoft lancets, dextran 70-hypromellose, lamotrigine, hypromellose, miconazole, latex gloves one size, NONFORMULARY, menthol, and epinephrine. ALLERGIES:  is allergic to bee venom. FAMILY HISTORY: Negative for any hematological or oncological conditions. SOCIAL HISTORY:  reports that he has never smoked. He has never used smokeless tobacco. He reports that he does not drink alcohol and does not use drugs. REVIEW OF SYSTEMS:  General: no fever or night sweats, Weight is stable. ENT: No double or blurred vision, no tinnitus or hearing problem, no dysphagia or sore throat   Respiratory: No chest pain, no shortness of breath, no cough or hemoptysis. Cardiovascular: Denies chest pain, PND or orthopnea.  No L E swelling or palpitations. Gastrointestinal:    No nausea or vomiting, abdominal pain, diarrhea or constipation. Genitourinary: Denies dysuria, hematuria, frequency, urgency or incontinence. Neurological: Denies headaches, decreased LOC, no sensory or motor focal deficits. Musculoskeletal:  No arthralgia no back pain or joint swelling. Skin: There are no rashes or bleeding. Itching in the groin area as discussed  Psychiatric:  No anxiety, no depression. Endocrine: no diabetes or thyroid disease. Hematologic: no bleeding , no adenopathy. PHYSICAL EXAM: Shows a well appearing 60y.o.-year-old male who is not in pain or distress. Vital Signs: Blood pressure (!) 121/50, pulse 72, temperature 97.4 °F (36.3 °C), temperature source Temporal, resp. rate 16, weight 216 lb 9.6 oz (98.2 kg). HEENT: Normocephalic and atraumatic. Pupils are equal, round, reactive to light and accommodation. Extraocular muscles are intact. Neck: Showed no JVD, no carotid bruit . Lungs: Clear to auscultation bilaterally. Heart: Regular without any murmur. Abdomen: Soft, nontender, distended, I could feel the liver edge of the liver at the costal margin extremities: Lower extremities show no edema, clubbing, or cyanosis. Breasts: Examination not done today.  Neuro exam: intact cranial nerves bilaterally no motor or sensory deficit, gait is normal. Lymphatic: no adenopathy appreciated in the supraclavicular, axillary, cervical or inguinal area  Examination of the groin area showed tinea cruris  Review of radiological studies:     Review of laboratory data:    Iron and TIBC (11/28/2020 9:21 AM EST)  Iron and TIBC (11/28/2020 9:21 AM EST)   Component Value Ref Range Performed At Pathologist Signature   Iron 104 50 - 212 ug/dL [facility] LAB     Tibc-calc only do not order 412 250 - 425 ug/dL [facility]     Iron Saturation 25 20 - 50 % 2026 [facility]       Iron and TIBC (11/28/2020 9:21 AM EST)   Specimen   Serum     Iron and TIBC (11/28/2020 9:21 AM EST)   Performing Organization Address City/State/ZIP Code Phone Number   [facility] [place] [facility]   [address] [facility] [address] [address] [facility]        Back to top of Lab Results       Ferritin (11/28/2020 9:21 AM EST)  Ferritin (11/28/2020 9:21 AM EST)   Component Value Ref Range Performed At Pathologist Signature   Ferritin 21 (L) 24 - 336 ng/mL [facility]       Ferritin (11/28/2020 9:21 AM EST)   Specimen   Serum     Ferritin (11/28/2020 9:21 AM EST)   Performing Organization Address City/State/ZIP Code Phone Number   [facility] [place] [facility]   [address] [facility], [address] [facility]        Back to top of Lab Results       Comprehensive metabolic panel (12/18/0871 9:21 AM EST)  Comprehensive metabolic panel (15/21/5378 9:21 AM EST)   Component Value Ref Range Performed At Pathologist [facility]   Sodium 140 134 - 146 mmol/L [facility]     Potassium, Bld 3.9 3.5 - 5.0 mmol/L [facility]     Chloride 104 98 - 109 mmol/L [facility]     CO2 27 22 - 32 mmol/L [facility]     Anion gap 9 5 - 15 mmol/L [facility]     BUN 20 5 - 27 mg/dL [facility]     Creatinine 1.09Comment: METHOD TRACEABLE TO IDMS STANDARD 0.60 - 1.30 mg/dL [facility]     Glucose 190 (H) 65 - 99 mg/dL [facility]     Calcium 9.0 8.5 - 10.5 mg/dL [facility]     Total Protein 7.2 6.0 - 8.0 g/dL [facility]     Albumin 3.9 3.2 - 5.3 g/dL [facility]     Alkaline Phosphatase 96 39 - 130 U/L [facility]     AST 19 0 - 41 U/L [facility]     ALT 20 0 - 40 U/L [facility]     Total bilirubin 1.3 (H) 0.3 - 1.2 mg/dL [facility]     GFR MDRD Non Af Amer >60 >59 ml/min/1.73sq.m [facility]     GFR MDRD Af Amer >60 >59 ml/min/1.73sq.m [facility]       Comprehensive metabolic panel (52/59/9157 9:21 AM EST)   Specimen   Serum     Comprehensive metabolic panel (32/09/7706 9:21 AM EST)   Performing Organization Address City/State/ZIP Code Phone Number   [facility] [place] [facility]   [address] [place], [place] [address]        Back to top of Lab Results       CBC auto differential (11/28/2020 9:21 AM EST)  CBC auto differential (11/28/2020 9:21 AM EST)   Component Value Ref Range Performed At Pathologist Signature   White Blood Cells 4.0 4.0 - 11.0 [address] [facility]     RBC count 3.77 (L) 4.10 - 5.70 X10E12/L [facility] LAB     Hemoglobin 11.9 (L) 13.0 - 17.0 g/dL [facility] LAB     Hematocrit 35.0 (L) 39 - 49 % [facility] LAB     MCV 93 80 - 100 fL [facility] LAB     MCH 31.6 27 - 34 pg [facility] LAB     MCHC 34.0 32 - 36 g/dL [facility] LAB     RDW 13.8 11.5 - 15.0 % [facility] LAB     Platelets 68 (L) 960 - 7353 [facility]     MPV 9.5 7 - 12 fL [facility] LAB     % neutrophils 64.5 % [facility]     % lymphocytes 20.6 % [facility]     % monocytes 12.7 % [facility] LAB     % eosinophils 1.5 % [facility]     % Basophils 0.7 % [facility]     Neutrophils Absolute (A) 2.6 1.5 - 6.6 X10E9/L [facility] LAB     Lymphocytes Absolute 0.8 (L) 1.0 - 3.5 X10E9/L [facility] LAB     Monocytes Absolute 0.5 0 - 0.9 [address] [address] [place] [address] LAB     Eosinophils Absolute 0.1 0.0 - 0.4 [address] [address] LAB     Basophils Absolute 0.0 0.0 - 0.2 [address] LAB       CBC auto differential (11/28/2020 9:21 AM EST)   Specimen IMPRESSION:   Pancytopenia related to liver disease, labs are stable  Patient is relatively symptomatic from his thrombocytopenia  Anemia, possibly related to liver disease. With concomitant iron deficiency  Plan:   The patient platelets and liver function are stable. We will continue current therapy without changes. Continue oral iron. We will see him back in 6 months for follow-up. [address]                            This note is created with the assistance of a speech recognition program.  While intending to generate a document that actually reflects the content of the visit, the document can still have some errors including those of syntax and sound a like substitutions which may escape proof reading. It such instances, actual meaning can be extrapolated by contextual diversion.

## 2022-11-09 ENCOUNTER — HOSPITAL ENCOUNTER (OUTPATIENT)
Dept: ULTRASOUND IMAGING | Age: 62
Discharge: HOME | End: 2022-11-11
Payer: MEDICARE

## 2022-11-09 DIAGNOSIS — K74.60 CIRRHOSIS OF LIVER WITHOUT ASCITES, UNSPECIFIED HEPATIC CIRRHOSIS TYPE (HCC): ICD-10-CM

## 2022-11-09 PROCEDURE — 76705 ECHO EXAM OF ABDOMEN: CPT

## 2023-01-24 DIAGNOSIS — K90.9 INTESTINAL MALABSORPTION, UNSPECIFIED TYPE: ICD-10-CM

## 2023-01-24 DIAGNOSIS — D50.9 IRON DEFICIENCY ANEMIA, UNSPECIFIED IRON DEFICIENCY ANEMIA TYPE: ICD-10-CM

## 2023-01-24 RX ORDER — DOXYCYCLINE HYCLATE 50 MG/1
CAPSULE, GELATIN COATED ORAL
Qty: 30 TABLET | Refills: 4 | Status: SHIPPED | OUTPATIENT
Start: 2023-01-24

## 2023-04-04 ENCOUNTER — HOSPITAL ENCOUNTER (OUTPATIENT)
Age: 63
Discharge: HOME | End: 2023-04-04
Payer: MEDICARE

## 2023-04-04 ENCOUNTER — OFFICE VISIT (OUTPATIENT)
Dept: GASTROENTEROLOGY | Age: 63
End: 2023-04-04
Payer: MEDICARE

## 2023-04-04 VITALS
WEIGHT: 224.2 LBS | TEMPERATURE: 98 F | BODY MASS INDEX: 26.47 KG/M2 | HEIGHT: 77 IN | HEART RATE: 65 BPM | RESPIRATION RATE: 20 BRPM | DIASTOLIC BLOOD PRESSURE: 68 MMHG | SYSTOLIC BLOOD PRESSURE: 113 MMHG

## 2023-04-04 DIAGNOSIS — I85.00 ESOPHAGEAL VARICES WITHOUT BLEEDING, UNSPECIFIED ESOPHAGEAL VARICES TYPE (HCC): ICD-10-CM

## 2023-04-04 DIAGNOSIS — D69.6 THROMBOCYTOPENIA (HCC): Primary | ICD-10-CM

## 2023-04-04 DIAGNOSIS — D69.6 THROMBOCYTOPENIA (HCC): ICD-10-CM

## 2023-04-04 DIAGNOSIS — K76.6 PORTAL HYPERTENSION (HCC): ICD-10-CM

## 2023-04-04 DIAGNOSIS — Z86.19 HISTORY OF HEPATITIS B: ICD-10-CM

## 2023-04-04 DIAGNOSIS — Z01.818 PRE-OP TESTING: Primary | ICD-10-CM

## 2023-04-04 PROBLEM — G40.89 OTHER SEIZURES (HCC): Status: ACTIVE | Noted: 2023-04-04

## 2023-04-04 PROBLEM — R56.9 UNSPECIFIED CONVULSIONS (HCC): Status: ACTIVE | Noted: 2023-04-04

## 2023-04-04 LAB
ABSOLUTE EOS #: 0.11 K/UL (ref 0–0.44)
ABSOLUTE IMMATURE GRANULOCYTE: 0 K/UL (ref 0–0.3)
ABSOLUTE LYMPH #: 0.81 K/UL (ref 1.1–3.7)
ABSOLUTE MONO #: 0.48 K/UL (ref 0.1–1.2)
ALBUMIN SERPL-MCNC: 4 G/DL (ref 3.5–5.2)
ALBUMIN/GLOBULIN RATIO: 1.2 (ref 1–2.5)
ALP SERPL-CCNC: 118 U/L (ref 40–129)
ALT SERPL-CCNC: 18 U/L (ref 5–41)
ANION GAP SERPL CALCULATED.3IONS-SCNC: 10 MMOL/L (ref 9–17)
AST SERPL-CCNC: 25 U/L
BASOPHILS # BLD: 1 % (ref 0–2)
BASOPHILS ABSOLUTE: 0.04 K/UL (ref 0–0.2)
BILIRUB DIRECT SERPL-MCNC: <0.1 MG/DL
BILIRUB INDIRECT SERPL-MCNC: ABNORMAL MG/DL (ref 0–1)
BILIRUB SERPL-MCNC: 0.5 MG/DL (ref 0.3–1.2)
BUN SERPL-MCNC: 18 MG/DL (ref 8–23)
CALCIUM SERPL-MCNC: 9 MG/DL (ref 8.6–10.4)
CHLORIDE SERPL-SCNC: 108 MMOL/L (ref 98–107)
CO2 SERPL-SCNC: 22 MMOL/L (ref 20–31)
CREAT SERPL-MCNC: 1.19 MG/DL (ref 0.7–1.2)
EOSINOPHILS RELATIVE PERCENT: 3 % (ref 1–4)
GFR SERPL CREATININE-BSD FRML MDRD: >60 ML/MIN/1.73M2
GLUCOSE SERPL-MCNC: 174 MG/DL (ref 70–99)
HCT VFR BLD AUTO: 36.1 % (ref 40.7–50.3)
HGB BLD-MCNC: 11.9 G/DL (ref 13–17)
IMMATURE GRANULOCYTES: 0 %
LYMPHOCYTES # BLD: 22 % (ref 24–43)
MCH RBC QN AUTO: 30.5 PG (ref 25.2–33.5)
MCHC RBC AUTO-ENTMCNC: 33 G/DL (ref 28.4–34.8)
MCV RBC AUTO: 92.6 FL (ref 82.6–102.9)
MONOCYTES # BLD: 13 % (ref 3–12)
MORPHOLOGY: ABNORMAL
NRBC AUTOMATED: 0 PER 100 WBC
PDW BLD-RTO: 14.1 % (ref 11.8–14.4)
PLATELET # BLD AUTO: ABNORMAL K/UL (ref 138–453)
PLATELET, FLUORESCENCE: 61 K/UL (ref 138–453)
PLATELET, IMMATURE FRACTION: 4.2 % (ref 1.1–10.3)
POTASSIUM SERPL-SCNC: 4.4 MMOL/L (ref 3.7–5.3)
PROT SERPL-MCNC: 7.3 G/DL (ref 6.4–8.3)
RBC # BLD: 3.9 M/UL (ref 4.21–5.77)
SEG NEUTROPHILS: 61 % (ref 36–65)
SEGMENTED NEUTROPHILS ABSOLUTE COUNT: 2.26 K/UL (ref 1.5–8.1)
SODIUM SERPL-SCNC: 140 MMOL/L (ref 135–144)
WBC # BLD AUTO: 3.7 K/UL (ref 3.5–11.3)

## 2023-04-04 PROCEDURE — 99215 OFFICE O/P EST HI 40 MIN: CPT | Performed by: INTERNAL MEDICINE

## 2023-04-04 PROCEDURE — 85055 RETICULATED PLATELET ASSAY: CPT

## 2023-04-04 PROCEDURE — 3078F DIAST BP <80 MM HG: CPT | Performed by: INTERNAL MEDICINE

## 2023-04-04 PROCEDURE — 1036F TOBACCO NON-USER: CPT | Performed by: INTERNAL MEDICINE

## 2023-04-04 PROCEDURE — 80053 COMPREHEN METABOLIC PANEL: CPT

## 2023-04-04 PROCEDURE — G8419 CALC BMI OUT NRM PARAM NOF/U: HCPCS | Performed by: INTERNAL MEDICINE

## 2023-04-04 PROCEDURE — 99202 OFFICE O/P NEW SF 15 MIN: CPT | Performed by: INTERNAL MEDICINE

## 2023-04-04 PROCEDURE — 36415 COLL VENOUS BLD VENIPUNCTURE: CPT

## 2023-04-04 PROCEDURE — 3074F SYST BP LT 130 MM HG: CPT | Performed by: INTERNAL MEDICINE

## 2023-04-04 PROCEDURE — 85025 COMPLETE CBC W/AUTO DIFF WBC: CPT

## 2023-04-04 PROCEDURE — 82248 BILIRUBIN DIRECT: CPT

## 2023-04-04 PROCEDURE — G8427 DOCREV CUR MEDS BY ELIG CLIN: HCPCS | Performed by: INTERNAL MEDICINE

## 2023-04-04 PROCEDURE — 3017F COLORECTAL CA SCREEN DOC REV: CPT | Performed by: INTERNAL MEDICINE

## 2023-04-04 NOTE — PATIENT INSTRUCTIONS
SURVEY:    You may be receiving a survey from [employer] regarding your visit today. Please complete the survey to enable us to provide the highest quality of care to you and your family. If you cannot score us a very good on any question, please call the office to discuss how we could have made your experience a very good one. Thank you.

## 2023-04-04 NOTE — H&P (VIEW-ONLY)
[facility]    Chief Complaint   Patient presents with    New Patient     Esophageal varices-without bleeding. Denies any nausea, vomiting, diarrhea or constipation. HPI  [facility]. Tiesha Covarrubias is a  58year old man with a past medical history of cirrhosis, diabetes mellitus II, esophageal varices, hepatitis A and B, mental disabilitywho presents for variceal screening. His last variceal screening was in 2018 by Dr. Sara Pandya. EGD 07/2020  Duodenum:     Descending: normal    Bulb: abnormal: Solitary superficial erosion (see photo)     Stomach:    Antrum: normal, biopsied for CLOtest    Body: normal    Fundus: normal     Esophagus: abnormal: distal esophageal scarring from prior EVL, several columns of grade 2 esophageal varices     The scope was removed and the patient tolerated the procedure well. EGD 03/16/2020  Duodenum:     Descending: normal    Bulb: normal     Stomach:    Antrum: normal    Body: abnormal: Portal hypertensive gastropathy    Fundus: abnormal: Portal hypertensive gastropathy     Esophagus: Scarring secondary to prior banding, several columns of grade 2 varices     The scope was removed and the patient tolerated the procedure well.      Past Medical History:   Diagnosis Date    Cirrhosis ([address]     DM type 2 (diabetes mellitus, type 2) ([address]     Esophageal varices without bleeding ([address]     Essential hypertension     Hepatitis     A and B    Mental disability     Mixed hyperlipidemia     Portal hypertension ([address]     TB (tuberculosis)          Past Surgical History:   Procedure Laterality Date    CATARACT REMOVAL  1977    CHOLECYSTECTOMY  2007    COLONOSCOPY  03/21/2016    -polyps,diverticulosis,hemorrhoids    COLONOSCOPY  11/29/2017    -hemorrhoids    ENDOSCOPY, COLON, DIAGNOSTIC      EGD numerous times    ENDOSCOPY, COLON, DIAGNOSTIC      most recent 11-05-12    EYE SURGERY      bilateral cataracts    NASAL POLYP SURGERY      NOSE

## 2023-04-04 NOTE — PROGRESS NOTES
[facility]    Chief Complaint   Patient presents with    New Patient     Esophageal varices-without bleeding. Denies any nausea, vomiting, diarrhea or constipation. HPI  [facility]. Sharon Frances is a  58year old man with a past medical history of cirrhosis, diabetes mellitus II, esophageal varices, hepatitis A and B, mental disabilitywho presents for variceal screening. His last variceal screening was in 2018 by Dr. Mega Friedman. EGD 07/2020  Duodenum:     Descending: normal    Bulb: abnormal: Solitary superficial erosion (see photo)     Stomach:    Antrum: normal, biopsied for CLOtest    Body: normal    Fundus: normal     Esophagus: abnormal: distal esophageal scarring from prior EVL, several columns of grade 2 esophageal varices     The scope was removed and the patient tolerated the procedure well. EGD 03/16/2020  Duodenum:     Descending: normal    Bulb: normal     Stomach:    Antrum: normal    Body: abnormal: Portal hypertensive gastropathy    Fundus: abnormal: Portal hypertensive gastropathy     Esophagus: Scarring secondary to prior banding, several columns of grade 2 varices     The scope was removed and the patient tolerated the procedure well.      Past Medical History:   Diagnosis Date    Cirrhosis ([address]     DM type 2 (diabetes mellitus, type 2) ([address]     Esophageal varices without bleeding ([address]     Essential hypertension     Hepatitis     A and B    Mental disability     Mixed hyperlipidemia     Portal hypertension ([address]     TB (tuberculosis)          Past Surgical History:   Procedure Laterality Date    CATARACT REMOVAL  1977    CHOLECYSTECTOMY  2007    COLONOSCOPY  03/21/2016    -polyps,diverticulosis,hemorrhoids    COLONOSCOPY  11/29/2017    -hemorrhoids    ENDOSCOPY, COLON, DIAGNOSTIC      EGD numerous times    ENDOSCOPY, COLON, DIAGNOSTIC      most recent 11-05-12    EYE SURGERY      bilateral cataracts    NASAL POLYP SURGERY      NOSE

## 2023-04-05 ENCOUNTER — HOSPITAL ENCOUNTER (OUTPATIENT)
Age: 63
Setting detail: OUTPATIENT SURGERY
Discharge: HOME | End: 2023-04-05
Attending: INTERNAL MEDICINE | Admitting: INTERNAL MEDICINE
Payer: MEDICARE

## 2023-04-05 ENCOUNTER — ANESTHESIA (OUTPATIENT)
Dept: OPERATING ROOM | Age: 63
End: 2023-04-05
Payer: MEDICARE

## 2023-04-05 ENCOUNTER — ANESTHESIA EVENT (OUTPATIENT)
Dept: OPERATING ROOM | Age: 63
End: 2023-04-05
Payer: MEDICARE

## 2023-04-05 ENCOUNTER — TELEPHONE (OUTPATIENT)
Dept: GASTROENTEROLOGY | Age: 63
End: 2023-04-05

## 2023-04-05 ENCOUNTER — HOSPITAL ENCOUNTER (OUTPATIENT)
Age: 63
Discharge: HOME | End: 2023-04-05
Payer: MEDICARE

## 2023-04-05 VITALS
SYSTOLIC BLOOD PRESSURE: 126 MMHG | TEMPERATURE: 97.4 F | DIASTOLIC BLOOD PRESSURE: 68 MMHG | OXYGEN SATURATION: 95 % | RESPIRATION RATE: 17 BRPM | WEIGHT: 216 LBS | BODY MASS INDEX: 25.61 KG/M2 | HEART RATE: 68 BPM

## 2023-04-05 DIAGNOSIS — Z01.818 PRE-OP TESTING: ICD-10-CM

## 2023-04-05 LAB — GLUCOSE BLD-MCNC: 75 MG/DL (ref 74–100)

## 2023-04-05 PROCEDURE — 3609012300 HC EGD BAND LIGATION ESOPHGEAL/GASTRIC VARICES: Performed by: INTERNAL MEDICINE

## 2023-04-05 PROCEDURE — 2500000003 HC RX 250 WO HCPCS: Performed by: NURSE ANESTHETIST, CERTIFIED REGISTERED

## 2023-04-05 PROCEDURE — 82947 ASSAY GLUCOSE BLOOD QUANT: CPT

## 2023-04-05 PROCEDURE — 3700000001 HC ADD 15 MINUTES (ANESTHESIA): Performed by: INTERNAL MEDICINE

## 2023-04-05 PROCEDURE — 93005 ELECTROCARDIOGRAM TRACING: CPT

## 2023-04-05 PROCEDURE — 7100000011 HC PHASE II RECOVERY - ADDTL 15 MIN: Performed by: INTERNAL MEDICINE

## 2023-04-05 PROCEDURE — 2720000010 HC SURG SUPPLY STERILE: Performed by: INTERNAL MEDICINE

## 2023-04-05 PROCEDURE — 2580000003 HC RX 258: Performed by: INTERNAL MEDICINE

## 2023-04-05 PROCEDURE — 3700000000 HC ANESTHESIA ATTENDED CARE: Performed by: INTERNAL MEDICINE

## 2023-04-05 PROCEDURE — 2709999900 HC NON-CHARGEABLE SUPPLY: Performed by: INTERNAL MEDICINE

## 2023-04-05 PROCEDURE — 43244 EGD VARICES LIGATION: CPT | Performed by: INTERNAL MEDICINE

## 2023-04-05 PROCEDURE — 6360000002 HC RX W HCPCS: Performed by: NURSE ANESTHETIST, CERTIFIED REGISTERED

## 2023-04-05 PROCEDURE — 7100000010 HC PHASE II RECOVERY - FIRST 15 MIN: Performed by: INTERNAL MEDICINE

## 2023-04-05 RX ORDER — SODIUM CHLORIDE, SODIUM LACTATE, POTASSIUM CHLORIDE, CALCIUM CHLORIDE 600; 310; 30; 20 MG/100ML; MG/100ML; MG/100ML; MG/100ML
INJECTION, SOLUTION INTRAVENOUS CONTINUOUS
Status: DISCONTINUED | OUTPATIENT
Start: 2023-04-05 | End: 2023-04-05 | Stop reason: HOSPADM

## 2023-04-05 RX ORDER — PROPOFOL 10 MG/ML
INJECTION, EMULSION INTRAVENOUS PRN
Status: DISCONTINUED | OUTPATIENT
Start: 2023-04-05 | End: 2023-04-05 | Stop reason: SDUPTHER

## 2023-04-05 RX ORDER — LIDOCAINE HYDROCHLORIDE 20 MG/ML
INJECTION, SOLUTION EPIDURAL; INFILTRATION; INTRACAUDAL; PERINEURAL PRN
Status: DISCONTINUED | OUTPATIENT
Start: 2023-04-05 | End: 2023-04-05 | Stop reason: SDUPTHER

## 2023-04-05 RX ORDER — PROPOFOL 10 MG/ML
INJECTION, EMULSION INTRAVENOUS CONTINUOUS PRN
Status: DISCONTINUED | OUTPATIENT
Start: 2023-04-05 | End: 2023-04-05 | Stop reason: SDUPTHER

## 2023-04-05 RX ADMIN — PROPOFOL 10 MG: 10 INJECTION, EMULSION INTRAVENOUS at 14:24

## 2023-04-05 RX ADMIN — PROPOFOL 150 MCG/KG/MIN: 10 INJECTION, EMULSION INTRAVENOUS at 14:21

## 2023-04-05 RX ADMIN — PHENYLEPHRINE HYDROCHLORIDE 100 MCG: 10 INJECTION INTRAVENOUS at 14:40

## 2023-04-05 RX ADMIN — PROPOFOL 25 MG: 10 INJECTION, EMULSION INTRAVENOUS at 14:21

## 2023-04-05 RX ADMIN — LIDOCAINE HYDROCHLORIDE 120 MG: 20 INJECTION, SOLUTION EPIDURAL; INFILTRATION; INTRACAUDAL; PERINEURAL at 14:21

## 2023-04-05 RX ADMIN — SODIUM CHLORIDE, POTASSIUM CHLORIDE, SODIUM LACTATE AND CALCIUM CHLORIDE: 600; 310; 30; 20 INJECTION, SOLUTION INTRAVENOUS at 12:49

## 2023-04-05 RX ADMIN — PHENYLEPHRINE HYDROCHLORIDE 100 MCG: 10 INJECTION INTRAVENOUS at 14:38

## 2023-04-05 RX ADMIN — PROPOFOL 80 MG: 10 INJECTION, EMULSION INTRAVENOUS at 14:20

## 2023-04-05 RX ADMIN — LIDOCAINE HYDROCHLORIDE 80 MG: 20 INJECTION, SOLUTION EPIDURAL; INFILTRATION; INTRACAUDAL; PERINEURAL at 14:19

## 2023-04-05 NOTE — TELEPHONE ENCOUNTER
I called [facility] yesterday for informed consent for an EGD to be done 4/5/23. I spoke with [facility] representative and verbal/phone consent obtained.

## 2023-04-05 NOTE — PROGRESS NOTES
Patient states he needs to urinate. Ambulates to bathroom with 2 assist. Voids qs. Assisted patient to dress. Returns to bay 4. Instructions reviewed with caregiver.

## 2023-04-05 NOTE — INTERVAL H&P NOTE
Update History & Physical    The patient's History and Physical of April 4, 2023 was reviewed with the patient and I examined the patient. There was no change. The surgical site was confirmed by the patient and me. Plan: The risks, benefits, expected outcome, and alternative to the recommended procedure have been discussed with the patient. Patient understands and wants to proceed with the procedure.      Electronically signed by Kelsey Landrum MD on 4/5/2023 at 12:40 PM

## 2023-04-05 NOTE — TELEPHONE ENCOUNTER
----- Message from Catrachita Harmon MD sent at 4/5/2023  2:59 PM EDT -----  Regarding: EGD for variceal surveillance  Repeat EGD in 4-6 weeks with CBC 1 day prior to EGD. If platelets are less than 60, procedure cannot be performed.      Thanks  Dr. Clementina Ascencio

## 2023-04-05 NOTE — PROGRESS NOTES

## 2023-04-05 NOTE — DISCHARGE INSTRUCTIONS
SAME DAY SURGERY DISCHARGE INSTRUCTIONS    1. Do not drive or operate hazardous machinery for 24 hours. 2.  Do not make important personal or business decisions for 24 hours. 3.  Do not drink alcoholic beverages for 24 hours. 4.  Do not smoke tobacco products for 24 hours. 5.  Eat light foods (Jell-O, soups, etc....) and drink plenty of fluids (water, Sprite, etc...) up to 8 glasses per day, as you can tolerate. 6.  Limit your activities for 24 hours. Do not engage in heavy work until your surgeon gives you permission. 7.  Call your surgeon for any questions regarding your surgery. 8.  Patient should not be left alone for 12-24 hours following surgical procedure. ENDOSCOPY DISCHARGE INSTRUCTIONS:    You may have a mild sore throat; this should get better over the next day or two. Sipping warm liquids, a salt-water gargle or throat lozenges may be used. You may have some belching or a feeling of fullness in your abdomen. This is from air that was put into your stomach during the procedure. This should pass in a few hours. May resume your regular diet. You will receive a letter or phone call with your test results in 2 weeks. If you have not received a letter or a phone call in 2 weeks please call the office for your results. CALL THE DOCTOR IF YOU HAVE:    Chest pain or trouble breathing. A hoarse voice or trouble swallowing    Bleeding, vomiting or spitting up of blood that is more than a few streaks or red or black stools    A fever above 101F or if you have chills    Pain that is worse or different than any pain you had before the procedure    Nausea or vomiting that lasts for more than 2 hours. If symptoms are to severe call 911 or go to the nearest Emergency Room.

## 2023-04-05 NOTE — ANESTHESIA POSTPROCEDURE EVALUATION
Department of Anesthesiology  Postprocedure Note    Patient: Eliceo Marrero  MRN: [id number]  YOB: 1960  Date of evaluation: 4/5/2023      Procedure Summary     Date: 04/05/23 Room / Location: [address] [place] [address]    Anesthesia Start: 4715 Anesthesia Stop: 0900    Procedure: EGD BAND LIGATION Diagnosis:       Esophageal varices without bleeding, unspecified esophageal varices type ([address]      (THROMBOCYTOPENIA    ESOPHAGEAL VARICES)    Surgeons: Lima Dupree MD Responsible Provider: Tennis Sever, APRN - CRNA    Anesthesia Type: general ASA Status: 3          Anesthesia Type: No value filed.     Luisa Phase I: Luisa Score: 10    Luisa Phase II: Luisa Score: 9      Anesthesia Post Evaluation    Patient location during evaluation: PACU  Patient participation: complete - patient cannot participate (patient is MRDD, guardian at bedside)  Level of consciousness: sleepy but conscious  Pain score: 0  Airway patency: patent  Nausea & Vomiting: no nausea and no vomiting  Complications: no  Cardiovascular status: blood pressure returned to baseline  Respiratory status: acceptable and room air  Hydration status: stable

## 2023-04-05 NOTE — OP NOTE
[facility]    EGD    PROCEDURE DATE: 04/05/23    REFERRING PHYSICIAN: No ref. provider found     PRIMARY CARE PROVIDER: Sunshine Ramirez MD    ATTENDING PHYSICIAN: Virgil Good MD     HISTORY: Mr. Samantha Corea is a 58 y.o. male who presents to the  Endoscopy unit for upper endoscopy. The patient's clinical history is remarkable for diabetes mellitus II, cirrhosis. He is currently medically stable and appropriate for the planned procedure. PREOPERATIVE DIAGNOSIS: Cirrhosis and varices. PROCEDURES:   1) Transoral Upper Endoscopy     POSTOPERATIVE DIAGNOSIS: Varices     MEDICATIONS: MAC per anesthesia     EBL: None    INSTRUMENT: Olympus GIF-H190 Flexible Gastroscope. PREPARATION: The nature and character of the procedure as well as risks, benefits, and alternatives were discussed with the patient and informed consent was obtained. Complications were said to include, but were not limited to: medication allergy, medication reaction, cardiovascular and respiratory problems, bleeding, perforation, infection, and/or missed diagnosis. Following arrival in the endoscopy room, the patient was placed in the left lateral decubitus position and final time-out accomplished in the presence of the nursing staff. Baseline vital signs were obtained and reviewed, and IV sedation was subsequently initiated. FINDINGS:   Esophagus: The esophagus was inspected to the Z-line. The endoscopic exam showed 4 columns of large varices. Stomach: The stomach was inspected in both forward and retroflex fashion and was appropriately distensible. The cardia, fundus, incisura, antrum and pylorus were identified via direct visualization. The endoscopic exam showed mucosa edema and erythema in the antrum. No ulcers on incisura or hiatal hernia. Duodenum: The proximal small bowel was inspected through the bulb, sweep, and second portion of the duodenum.  The endoscopic exam showed mucosal edema with no ulcers

## 2023-04-05 NOTE — ANESTHESIA PRE PROCEDURE
ACID,INDIGESTION OR HEARTBURN OR GAS *CALL PHARMACY FOR REFILLS* 10/3/22   Polina Dunaway MD   insulin glargine [facility]) 100 UNIT/ML injection pen Inject 60 Units into the skin every morning  Patient taking differently: Inject 35 Units into the skin 2 times daily 9/28/22   Polina Dunaway MD   EPINEPHrine [facility]) 0.3 MG/0.3ML SOAJ injection Inject 0.3 mLs into the muscle once for 1 dose As directed for bee sting 9/16/22 4/4/23  Polina Dunaway MD   metoprolol tartrate (LOPRESSOR) 25 MG tablet Take 1 tablet by mouth daily 9/12/22   Polina Dunaway MD   Insulin Pen Needle (BD ULTRA-FINE PEN NEEDLES) 29G X 12.7MM MISC 1 each by Does not apply route daily 5/20/22   Polina Dunaway MD   Insulin Pen Needle (BD ULTRA-FINE PEN NEEDLES) 29G X 12.7MM MISC 1 each by Does not apply route daily 5/19/22   Polina Dunaway MD   chlorhexidine (PERIDEX) 0.12 % solution Take 15 mLs by mouth daily USE 15 ML SWISH AND SPIT ONCE DAILY *CALL PHARMACY FOR REFILLS* 5/13/22   Polina Dunaway MD   sodium chloride (SALINE MIST) 0.65 % nasal spray 2 sprays by Nasal route daily 3/7/22   Polina Dunaway MD   carbamide peroxide [place] [facility]) 6.5 % otic solution Place 4 drops into both ears Twice a Week 2/10/22   Polina Dunaway MD   Dimethicone (AVEENO DAILY MOISTURIZING) 1.3 % LOTN lotion Apply topically to feet weekly 10/1/21   Polina Dunaway MD   risperiDONE (RISPERDAL) 0.25 MG tablet Take 1 tablet by mouth 2 times daily 8/26/21   Historical Provider, MD   dextran 70-hypromellose (TEARS NATURALE) 0.1-0.3 % SOLN opthalmic solution Place 1 drop into both eyes 4 times daily    Historical Provider, MD   lamoTRIgine (LAMICTAL) 100 MG tablet Take 1 tablet by mouth daily 8/1/18   Polina Dunaway MD   Hypromellose (GENTEAL MILD TO MODERATE OP) Place 1 drop into both eyes 2 times daily    Historical Provider, MD   miconazole (MICOTIN) 2 % powder Apply topically every morning And after shower    Historical

## 2023-04-06 LAB
EKG ATRIAL RATE: 63 BPM
EKG P AXIS: 59 DEGREES
EKG P-R INTERVAL: 150 MS
EKG Q-T INTERVAL: 458 MS
EKG QRS DURATION: 104 MS
EKG QTC CALCULATION (BAZETT): 468 MS
EKG R AXIS: 52 DEGREES
EKG T AXIS: 49 DEGREES
EKG VENTRICULAR RATE: 63 BPM

## 2023-04-06 PROCEDURE — 93010 ELECTROCARDIOGRAM REPORT: CPT | Performed by: FAMILY MEDICINE

## 2023-04-25 DIAGNOSIS — D50.9 IRON DEFICIENCY ANEMIA, UNSPECIFIED IRON DEFICIENCY ANEMIA TYPE: ICD-10-CM

## 2023-04-25 DIAGNOSIS — D69.6 THROMBOCYTOPENIA (HCC): Chronic | ICD-10-CM

## 2023-04-25 DIAGNOSIS — D61.818 PANCYTOPENIA (HCC): ICD-10-CM

## 2023-04-26 ENCOUNTER — OFFICE VISIT (OUTPATIENT)
Dept: ONCOLOGY | Age: 63
End: 2023-04-26
Payer: MEDICARE

## 2023-04-26 VITALS
WEIGHT: 220 LBS | RESPIRATION RATE: 18 BRPM | HEIGHT: 77 IN | SYSTOLIC BLOOD PRESSURE: 111 MMHG | DIASTOLIC BLOOD PRESSURE: 60 MMHG | HEART RATE: 67 BPM | BODY MASS INDEX: 25.98 KG/M2 | TEMPERATURE: 96.9 F

## 2023-04-26 DIAGNOSIS — D69.6 THROMBOCYTOPENIA (HCC): Primary | ICD-10-CM

## 2023-04-26 DIAGNOSIS — D50.9 IRON DEFICIENCY ANEMIA, UNSPECIFIED IRON DEFICIENCY ANEMIA TYPE: Primary | ICD-10-CM

## 2023-04-26 DIAGNOSIS — D50.9 IRON DEFICIENCY ANEMIA, UNSPECIFIED IRON DEFICIENCY ANEMIA TYPE: ICD-10-CM

## 2023-04-26 DIAGNOSIS — D69.6 THROMBOCYTOPENIA (HCC): ICD-10-CM

## 2023-04-26 PROCEDURE — 3074F SYST BP LT 130 MM HG: CPT | Performed by: INTERNAL MEDICINE

## 2023-04-26 PROCEDURE — 1036F TOBACCO NON-USER: CPT | Performed by: INTERNAL MEDICINE

## 2023-04-26 PROCEDURE — 3017F COLORECTAL CA SCREEN DOC REV: CPT | Performed by: INTERNAL MEDICINE

## 2023-04-26 PROCEDURE — 3078F DIAST BP <80 MM HG: CPT | Performed by: INTERNAL MEDICINE

## 2023-04-26 PROCEDURE — G8419 CALC BMI OUT NRM PARAM NOF/U: HCPCS | Performed by: INTERNAL MEDICINE

## 2023-04-26 PROCEDURE — G8428 CUR MEDS NOT DOCUMENT: HCPCS | Performed by: INTERNAL MEDICINE

## 2023-04-26 PROCEDURE — 99214 OFFICE O/P EST MOD 30 MIN: CPT | Performed by: INTERNAL MEDICINE

## 2023-04-26 RX ORDER — INSULIN ASPART 100 [IU]/ML
100 INJECTION, SOLUTION INTRAVENOUS; SUBCUTANEOUS
COMMUNITY

## 2023-04-26 NOTE — PROGRESS NOTES
Given LAB     Eosinophils Absolute 0.1 0.0 - 0.4 [address] [address] [facility] LAB     Basophils Absolute 0.0 0.0 - 0.2 [address] [address] LAB       CBC auto differential (11/28/2020 9:21 AM EST)   Specimen     Lab Results   Component Value Date    WBC 3.7 04/04/2023    HGB 11.9 (L) 04/04/2023    HCT 36.1 (L) 04/04/2023    MCV 92.6 04/04/2023    PLT See Reflexed IPF Result 04/04/2023       Chemistry        Component Value Date/Time     04/04/2023 1420    K 4.4 04/04/2023 1420     (H) 04/04/2023 1420    CO2 22 04/04/2023 1420    BUN 18 04/04/2023 1420    CREATININE 1.19 04/04/2023 1420        Component Value Date/Time    CALCIUM 9.0 04/04/2023 1420    ALKPHOS 118 04/04/2023 1420    AST 25 04/04/2023 1420    ALT 18 04/04/2023 1420    BILITOT 0.5 04/04/2023 1420              IMPRESSION:   Pancytopenia related to liver disease, labs are stable  Patient is relatively symptomatic from his thrombocytopenia  Anemia, possibly related to liver disease. With concomitant iron deficiency  Plan:   The patient platelets and liver function are stable. We will continue current therapy without changes. Continue oral iron. We will see him back in 6 months for follow-up. [facility]                            This note is created with the assistance of a speech recognition program.  While intending to generate a document that actually reflects the content of the visit, the document can still have some errors including those of syntax and sound a like substitutions which may escape proof reading. It such instances, actual meaning can be extrapolated by contextual diversion.

## 2023-05-02 ENCOUNTER — HOSPITAL ENCOUNTER (OUTPATIENT)
Dept: CT IMAGING | Age: 63
Discharge: HOME | End: 2023-05-04
Payer: MEDICARE

## 2023-05-02 DIAGNOSIS — I85.00 ESOPHAGEAL VARICES WITHOUT BLEEDING, UNSPECIFIED ESOPHAGEAL VARICES TYPE (HCC): ICD-10-CM

## 2023-05-02 DIAGNOSIS — D69.6 THROMBOCYTOPENIA (HCC): ICD-10-CM

## 2023-05-02 PROCEDURE — 74178 CT ABD&PLV WO CNTR FLWD CNTR: CPT

## 2023-05-02 PROCEDURE — 6360000004 HC RX CONTRAST MEDICATION: Performed by: INTERNAL MEDICINE

## 2023-05-02 RX ADMIN — IOPAMIDOL 75 ML: 755 INJECTION, SOLUTION INTRAVENOUS at 13:12

## 2023-05-15 ENCOUNTER — TELEPHONE (OUTPATIENT)
Dept: GASTROENTEROLOGY | Age: 63
End: 2023-05-15

## 2023-05-15 ENCOUNTER — ANESTHESIA EVENT (OUTPATIENT)
Dept: OPERATING ROOM | Age: 63
End: 2023-05-15
Payer: MEDICARE

## 2023-05-15 NOTE — TELEPHONE ENCOUNTER
Spoke with patient's legal guardian Conchis Manan regarding obtaining consent for procedure on 05/16/2023. Per [facility] verbal consent given for EGD procedure tomorrow.

## 2023-05-16 ENCOUNTER — ANESTHESIA (OUTPATIENT)
Dept: OPERATING ROOM | Age: 63
End: 2023-05-16
Payer: MEDICARE

## 2023-05-16 ENCOUNTER — HOSPITAL ENCOUNTER (OUTPATIENT)
Age: 63
Setting detail: OUTPATIENT SURGERY
Discharge: HOME | End: 2023-05-16
Attending: INTERNAL MEDICINE | Admitting: INTERNAL MEDICINE
Payer: MEDICARE

## 2023-05-16 ENCOUNTER — TELEPHONE (OUTPATIENT)
Dept: GASTROENTEROLOGY | Age: 63
End: 2023-05-16

## 2023-05-16 VITALS
HEIGHT: 78 IN | SYSTOLIC BLOOD PRESSURE: 127 MMHG | BODY MASS INDEX: 25.11 KG/M2 | HEART RATE: 70 BPM | TEMPERATURE: 97.1 F | OXYGEN SATURATION: 96 % | WEIGHT: 217 LBS | RESPIRATION RATE: 18 BRPM | DIASTOLIC BLOOD PRESSURE: 62 MMHG

## 2023-05-16 LAB — GLUCOSE BLD-MCNC: 208 MG/DL (ref 74–100)

## 2023-05-16 PROCEDURE — 43244 EGD VARICES LIGATION: CPT | Performed by: INTERNAL MEDICINE

## 2023-05-16 PROCEDURE — 2580000003 HC RX 258

## 2023-05-16 PROCEDURE — 2580000003 HC RX 258: Performed by: NURSE ANESTHETIST, CERTIFIED REGISTERED

## 2023-05-16 PROCEDURE — 7100000011 HC PHASE II RECOVERY - ADDTL 15 MIN: Performed by: INTERNAL MEDICINE

## 2023-05-16 PROCEDURE — 3700000000 HC ANESTHESIA ATTENDED CARE: Performed by: INTERNAL MEDICINE

## 2023-05-16 PROCEDURE — 2709999900 HC NON-CHARGEABLE SUPPLY: Performed by: INTERNAL MEDICINE

## 2023-05-16 PROCEDURE — 3700000001 HC ADD 15 MINUTES (ANESTHESIA): Performed by: INTERNAL MEDICINE

## 2023-05-16 PROCEDURE — 7100000010 HC PHASE II RECOVERY - FIRST 15 MIN: Performed by: INTERNAL MEDICINE

## 2023-05-16 PROCEDURE — 2720000010 HC SURG SUPPLY STERILE: Performed by: INTERNAL MEDICINE

## 2023-05-16 PROCEDURE — 6360000002 HC RX W HCPCS: Performed by: NURSE ANESTHETIST, CERTIFIED REGISTERED

## 2023-05-16 PROCEDURE — 3609012300 HC EGD BAND LIGATION ESOPHGEAL/GASTRIC VARICES: Performed by: INTERNAL MEDICINE

## 2023-05-16 PROCEDURE — 2500000003 HC RX 250 WO HCPCS: Performed by: NURSE ANESTHETIST, CERTIFIED REGISTERED

## 2023-05-16 PROCEDURE — 82947 ASSAY GLUCOSE BLOOD QUANT: CPT

## 2023-05-16 RX ORDER — SODIUM CHLORIDE, SODIUM LACTATE, POTASSIUM CHLORIDE, CALCIUM CHLORIDE 600; 310; 30; 20 MG/100ML; MG/100ML; MG/100ML; MG/100ML
INJECTION, SOLUTION INTRAVENOUS CONTINUOUS
Status: DISCONTINUED | OUTPATIENT
Start: 2023-05-16 | End: 2023-05-16 | Stop reason: HOSPADM

## 2023-05-16 RX ORDER — LIDOCAINE HYDROCHLORIDE 20 MG/ML
INJECTION, SOLUTION EPIDURAL; INFILTRATION; INTRACAUDAL; PERINEURAL PRN
Status: DISCONTINUED | OUTPATIENT
Start: 2023-05-16 | End: 2023-05-16 | Stop reason: SDUPTHER

## 2023-05-16 RX ORDER — PROPOFOL 10 MG/ML
INJECTION, EMULSION INTRAVENOUS CONTINUOUS PRN
Status: DISCONTINUED | OUTPATIENT
Start: 2023-05-16 | End: 2023-05-16 | Stop reason: SDUPTHER

## 2023-05-16 RX ORDER — SODIUM CHLORIDE, SODIUM LACTATE, POTASSIUM CHLORIDE, CALCIUM CHLORIDE 600; 310; 30; 20 MG/100ML; MG/100ML; MG/100ML; MG/100ML
INJECTION, SOLUTION INTRAVENOUS CONTINUOUS PRN
Status: DISCONTINUED | OUTPATIENT
Start: 2023-05-16 | End: 2023-05-16 | Stop reason: SDUPTHER

## 2023-05-16 RX ADMIN — SODIUM CHLORIDE, POTASSIUM CHLORIDE, SODIUM LACTATE AND CALCIUM CHLORIDE: 600; 310; 30; 20 INJECTION, SOLUTION INTRAVENOUS at 10:24

## 2023-05-16 RX ADMIN — PROPOFOL 50 MG: 10 INJECTION, EMULSION INTRAVENOUS at 10:29

## 2023-05-16 RX ADMIN — SODIUM CHLORIDE, POTASSIUM CHLORIDE, SODIUM LACTATE AND CALCIUM CHLORIDE: 600; 310; 30; 20 INJECTION, SOLUTION INTRAVENOUS at 09:41

## 2023-05-16 RX ADMIN — PROPOFOL 200 MCG/KG/MIN: 10 INJECTION, EMULSION INTRAVENOUS at 10:28

## 2023-05-16 RX ADMIN — LIDOCAINE HYDROCHLORIDE 5 ML: 20 INJECTION, SOLUTION EPIDURAL; INFILTRATION; INTRACAUDAL; PERINEURAL at 10:28

## 2023-05-16 RX ADMIN — LIDOCAINE HYDROCHLORIDE 5 ML: 20 INJECTION, SOLUTION EPIDURAL; INFILTRATION; INTRACAUDAL; PERINEURAL at 10:29

## 2023-05-16 NOTE — ANESTHESIA PRE PROCEDURE
02:20 PM       CMP:   Lab Results   Component Value Date/Time     04/04/2023 02:20 PM    K 4.4 04/04/2023 02:20 PM     04/04/2023 02:20 PM    CO2 22 04/04/2023 02:20 PM    BUN 18 04/04/2023 02:20 PM    CREATININE 1.19 04/04/2023 02:20 PM    GFRAA >60 09/09/2021 06:00 AM    LABGLOM >60 04/04/2023 02:20 PM    GLUCOSE 174 04/04/2023 02:20 PM    GLUCOSE 88 02/08/2012 08:55 AM    PROT 7.3 04/04/2023 02:20 PM    CALCIUM 9.0 04/04/2023 02:20 PM    BILITOT 0.5 04/04/2023 02:20 PM    ALKPHOS 118 04/04/2023 02:20 PM    AST 25 04/04/2023 02:20 PM    ALT 18 04/04/2023 02:20 PM       POC Tests:   No results for input(s): POCGLU, POCNA, POCK, POCCL, POCBUN, POCHEMO, POCHCT in the last 72 hours. Coags:   Lab Results   Component Value Date/Time    PROTIME 16.0 09/08/2021 08:30 PM    INR 1.3 09/08/2021 08:30 PM    APTT 39.9 09/08/2021 08:30 PM       HCG (If Applicable): No results found for: PREGTESTUR, PREGSERUM, HCG, HCGQUANT     ABGs: No results found for: PHART, PO2ART, VCJ0PVU, IGJ7CFG, BEART, W4BDRVGP     Type & Screen (If Applicable):  No results found for: LABABO, [address]    Anesthesia Evaluation  Patient summary reviewed and Nursing notes reviewed no history of anesthetic complications:   Airway: Mallampati: II  TM distance: >3 FB   Neck ROM: full  Comment: Beard noted  Mouth opening: > = 3 FB Dental:      Comment: Poor dentition    Pulmonary:Negative Pulmonary ROS and normal exam        (-) not a current smoker                          ROS comment: TB?  caregive uncertain   Cardiovascular:    (+) hypertension:, murmur, hyperlipidemia    Valvular problems/murmurs: murmur.     ECG reviewed                        Neuro/Psych:   (+) seizures (per caregiver- long time since last seizure): well controlled,              ROS comment: Mental disability GI/Hepatic/Renal:   (+) hepatitis: A and B, liver disease: portal hypertension, esophageal varices,           Endo/Other:    (+) DiabetesType I DM, using insulin,

## 2023-05-16 NOTE — ANESTHESIA POSTPROCEDURE EVALUATION
Department of Anesthesiology  Postprocedure Note    Patient: Sangeetha Manning  MRN: [id number]  YOB: 1960  Date of evaluation: 5/16/2023      Procedure Summary     Date: 05/16/23 Room / Location: [address] [place] [address]    Anesthesia Start: 1024 Anesthesia Stop: 1046    Procedure: EGD BAND LIGATION Diagnosis:       Esophageal varices without bleeding, unspecified esophageal varices type ([address]      (ESOPHAGEAL VARICIES)    Surgeons: Edita White MD Responsible Provider: SUZANNE Hoffmann CRNA    Anesthesia Type: general, TIVA ASA Status: 3          Anesthesia Type: No value filed.     Luisa Phase I: Luisa Score: 10    Luisa Phase II: Luisa Score: 9      Anesthesia Post Evaluation    Patient location during evaluation: bedside  Patient participation: complete - patient participated  Level of consciousness: awake and alert  Airway patency: patent  Nausea & Vomiting: no nausea and no vomiting  Complications: no  Cardiovascular status: hemodynamically stable  Respiratory status: acceptable  Hydration status: stable  Multimodal analgesia pain management approach

## 2023-05-16 NOTE — OP NOTE
[facility]    [facility]    PROCEDURE DATE: 05/16/23    REFERRING PHYSICIAN: No ref. provider found     PRIMARY CARE PROVIDER: Solomon Rosas MD    ATTENDING PHYSICIAN: Ivory Sethi MD     HISTORY: Mr. Sangeetha aMnning is a 58 y.o. male who presents to the  Endoscopy unit for upper endoscopy. The patient's clinical history is remarkable for type II diabetes mellitus. He is currently medically stable and appropriate for the planned procedure. PREOPERATIVE DIAGNOSIS:  Variceal banding    PROCEDURES:   1) Transoral Upper Endoscopy     POSTOPERATIVE DIAGNOSIS:      MEDICATIONS: MAC per anesthesia     EBL: None     INSTRUMENT: Olympus GIF-H190  flexible Gastroscope. PREPARATION: The nature and character of the procedure as well as risks, benefits, and alternatives were discussed with the patient and informed consent was obtained. Complications were said to include, but were not limited to: medication allergy, medication reaction, cardiovascular and respiratory problems, bleeding, perforation, infection, and/or missed diagnosis. Following arrival in the endoscopy room, the patient was placed in the left lateral decubitus position and final time-out accomplished in the presence of the nursing staff. Baseline vital signs were obtained and reviewed, and IV sedation was subsequently initiated. FINDINGS:   Esophagus: The esophagus was inspected to the Z-line. The endoscopic exam showed 4 large variceal columns - somewhat small er than columns from prior banding. Stomach: The stomach was inspected in both forward and retroflex fashion and was appropriately distensible. The cardia, fundus, incisura, antrum and pylorus were identified via direct visualization. The endoscopic exam showed no ulcers, mild erythema in antrum and no hiatal hernia. No ulcers on incisura on retroflex. Duodenum: The proximal small bowel was inspected through the bulb, sweep, and second portion of the duodenum.  The endoscopic

## 2023-05-16 NOTE — PROGRESS NOTES
Patient states ready to be discharged at this time. Discharge instructions given to pt and caregiver. Both verbalize understanding,deny any questions and/or concerns. Pt transfer off unit in wheelchair w/ all belongings. Discharge Criteria    Inpatients must meet Criteria 1 through 7. All other patients are either YES or N/A. If a NO is chosen then Anesthesia or Surgeon must be notified. 1.  Minimum 30 minutes after last dose of sedative medication. Yes      2. Systolic BP between 90 - 369. Diastolic BP between 60 - 90. Yes      3. Pulse between 60 - 120    Yes      4. Respirations between 8 - 25. Yes      5. SpO2 92% - 100%. Yes      6. Able to cough and swallow or return to baseline function. Yes      7. Alert and oriented or return to baseline mental status. Yes      8. Demonstrates controlled, coordinated movements, ambulates with steady gait, or return to baseline activity function. Yes      9. Minimal or no pain or nausea, or at a level tolerable and acceptable to patient. Yes      10. Takes and retains oral fluids as allowed. Yes      11. Procedural / perioperative site stable. Minimal or no bleeding. Yes          12. If GI endoscopy procedure, minimal or no abdominal distention or passing flatus. yES      13. Written discharge instructions and emergency telephone number provided. Yes      14. Accompanied by a responsible adult.     Yes

## 2023-05-16 NOTE — H&P
scarring from EVL,several columns grade 2 esophageal varices    UPPER GASTROINTESTINAL ENDOSCOPY N/A 07/27/2020    EGD BIOPSY performed by Siena Cornejo MD at [address] [place] [address]  05/05/2021    MHT/    UPPER GASTROINTESTINAL ENDOSCOPY N/A 05/05/2021    EGD BIOPSY performed by Lysle Meigs, MD at [address] [place] [address] Rd 04/05/2023    EGD BAND LIGATION performed by Mendy Call MD at [address] Facility-Administered Medications   Medication Dose Route Frequency Provider Last Rate Last Admin    lactated ringers IV soln infusion   IntraVENous Continuous Huan Pool, APRN - CRNA 100 mL/hr at 05/16/23 0941 New Bag at 05/16/23 0941     Allergies   Allergen Reactions    Bee Venom Swelling     History reviewed. No pertinent family history. Social History     Socioeconomic History    Marital status: Single     Spouse name: Not on file    Number of children: Not on file    Years of education: Not on file    Highest education level: Not on file   Occupational History    Not on file   Tobacco Use    Smoking status: Never    Smokeless tobacco: Never   Vaping Use    Vaping Use: Former   Substance and Sexual Activity    Alcohol use: No    Drug use: No    Sexual activity: Not on file   Other Topics Concern    Not on file   Social History Narrative    Not on file     Social Determinants of Health     Financial Resource Strain: Low Risk     Difficulty of Paying Living Expenses: Not hard at all   Food Insecurity: No Food Insecurity    Worried About Running Out of Food in the Last Year: Never true    [address] in the Last Year: Never true   Transportation Needs: No Transportation Needs    Lack of Transportation (Medical): No    Lack of Transportation (Non-Medical):  No   Physical Activity: Inactive    Days of Exercise per Week: 0 days    Minutes of Exercise per Session: 0 min   Stress: No Stress Concern Present    Feeling of Stress : Not at all   Social

## 2023-05-16 NOTE — DISCHARGE INSTRUCTIONS
IMPRESSION:  Esophageal varices     RECOMMENDATIONS:   1) Repeat banding in 8 weeks     SAME DAY SURGERY DISCHARGE INSTRUCTIONS    1. Do not drive or operate hazardous machinery for 24 hours. 2.  Do not make important personal or business decisions for 24 hours. 3.  Do not drink alcoholic beverages for 24 hours. 4.  Do not smoke tobacco products for 24 hours. 5.  Eat light foods (Jell-O, soups, etc....) and drink plenty of fluids (water, Sprite, etc...) up to 8 glasses per day, as you can tolerate. 6.  Limit your activities for 24 hours. Do not engage in heavy work until your surgeon gives you permission. 7.  Call your surgeon for any questions regarding your surgery. 8.  Patient should not be left alone for 12-24 hours following surgical procedure. ENDOSCOPY DISCHARGE INSTRUCTIONS:    You may have a mild sore throat; this should get better over the next day or two. Sipping warm liquids, a salt-water gargle or throat lozenges may be used. You may have some belching or a feeling of fullness in your abdomen. This is from air that was put into your stomach during the procedure. This should pass in a few hours. May resume your regular diet. You will receive a letter or phone call with your test results in 2 weeks. If you have not received a letter or a phone call in 2 weeks please call the office for your results. CALL THE DOCTOR IF YOU HAVE:    Chest pain or trouble breathing. A hoarse voice or trouble swallowing    Bleeding, vomiting or spitting up of blood that is more than a few streaks or red or black stools    A fever above 101F or if you have chills    Pain that is worse or different than any pain you had before the procedure    Nausea or vomiting that lasts for more than 2 hours. If symptoms are to severe call 911 or go to the nearest Emergency Room.

## 2023-06-13 PROBLEM — K52.1 DIARRHEA DUE TO DRUG: Status: RESOLVED | Noted: 2022-09-27 | Resolved: 2023-06-13

## 2023-06-27 DIAGNOSIS — D50.9 IRON DEFICIENCY ANEMIA, UNSPECIFIED IRON DEFICIENCY ANEMIA TYPE: ICD-10-CM

## 2023-06-27 DIAGNOSIS — K90.9 INTESTINAL MALABSORPTION, UNSPECIFIED TYPE: ICD-10-CM

## 2023-06-27 RX ORDER — DOXYCYCLINE HYCLATE 50 MG/1
CAPSULE, GELATIN COATED ORAL
Qty: 30 TABLET | Refills: 3 | OUTPATIENT
Start: 2023-06-27

## 2023-08-15 NOTE — PROGRESS NOTES
Patient's caregiver Selin Martines, states they received their endoscopy prep instructions from the physician's office. Caregiver states they understand medications (Lamictal) to be taken with sip of water only the a.m. of procedure. Caregiver will provide MAR day of procedure and will notify guardian to authorize consent day of procedure. Sacha Blankenship understanding.

## 2023-08-21 NOTE — TELEPHONE ENCOUNTER
Scheduled for 8/29/23. Could not do any sooner due to caregiver unavailability. Yes-Patient/Caregiver accepts free interpretation services...

## 2023-08-28 ENCOUNTER — ANESTHESIA EVENT (OUTPATIENT)
Dept: OPERATING ROOM | Age: 63
End: 2023-08-28
Payer: MEDICARE

## 2023-08-29 ENCOUNTER — TELEPHONE (OUTPATIENT)
Dept: GASTROENTEROLOGY | Age: 63
End: 2023-08-29

## 2023-08-29 ENCOUNTER — ANESTHESIA (OUTPATIENT)
Dept: OPERATING ROOM | Age: 63
End: 2023-08-29
Payer: MEDICARE

## 2023-08-29 ENCOUNTER — HOSPITAL ENCOUNTER (OUTPATIENT)
Age: 63
Setting detail: OUTPATIENT SURGERY
Discharge: HOME | End: 2023-08-29
Attending: INTERNAL MEDICINE | Admitting: INTERNAL MEDICINE
Payer: MEDICARE

## 2023-08-29 VITALS
RESPIRATION RATE: 16 BRPM | SYSTOLIC BLOOD PRESSURE: 122 MMHG | HEIGHT: 78 IN | BODY MASS INDEX: 25.22 KG/M2 | TEMPERATURE: 97.4 F | DIASTOLIC BLOOD PRESSURE: 63 MMHG | WEIGHT: 218 LBS | HEART RATE: 60 BPM | OXYGEN SATURATION: 96 %

## 2023-08-29 LAB — GLUCOSE BLD-MCNC: 69 MG/DL (ref 74–100)

## 2023-08-29 PROCEDURE — 2580000003 HC RX 258: Performed by: NURSE ANESTHETIST, CERTIFIED REGISTERED

## 2023-08-29 PROCEDURE — 7100000010 HC PHASE II RECOVERY - FIRST 15 MIN: Performed by: INTERNAL MEDICINE

## 2023-08-29 PROCEDURE — 3700000001 HC ADD 15 MINUTES (ANESTHESIA): Performed by: INTERNAL MEDICINE

## 2023-08-29 PROCEDURE — 2709999900 HC NON-CHARGEABLE SUPPLY: Performed by: INTERNAL MEDICINE

## 2023-08-29 PROCEDURE — 2500000003 HC RX 250 WO HCPCS: Performed by: NURSE ANESTHETIST, CERTIFIED REGISTERED

## 2023-08-29 PROCEDURE — 43244 EGD VARICES LIGATION: CPT | Performed by: INTERNAL MEDICINE

## 2023-08-29 PROCEDURE — 7100000011 HC PHASE II RECOVERY - ADDTL 15 MIN: Performed by: INTERNAL MEDICINE

## 2023-08-29 PROCEDURE — 3609012300 HC EGD BAND LIGATION ESOPHGEAL/GASTRIC VARICES: Performed by: INTERNAL MEDICINE

## 2023-08-29 PROCEDURE — 82947 ASSAY GLUCOSE BLOOD QUANT: CPT

## 2023-08-29 PROCEDURE — 6360000002 HC RX W HCPCS: Performed by: NURSE ANESTHETIST, CERTIFIED REGISTERED

## 2023-08-29 PROCEDURE — 2720000010 HC SURG SUPPLY STERILE: Performed by: INTERNAL MEDICINE

## 2023-08-29 PROCEDURE — 3700000000 HC ANESTHESIA ATTENDED CARE: Performed by: INTERNAL MEDICINE

## 2023-08-29 RX ORDER — SODIUM CHLORIDE, SODIUM LACTATE, POTASSIUM CHLORIDE, CALCIUM CHLORIDE 600; 310; 30; 20 MG/100ML; MG/100ML; MG/100ML; MG/100ML
INJECTION, SOLUTION INTRAVENOUS ONCE
Status: COMPLETED | OUTPATIENT
Start: 2023-08-29 | End: 2023-08-29

## 2023-08-29 RX ORDER — LIDOCAINE HYDROCHLORIDE 20 MG/ML
INJECTION, SOLUTION EPIDURAL; INFILTRATION; INTRACAUDAL; PERINEURAL PRN
Status: DISCONTINUED | OUTPATIENT
Start: 2023-08-29 | End: 2023-08-29 | Stop reason: SDUPTHER

## 2023-08-29 RX ORDER — SODIUM CHLORIDE 9 MG/ML
INJECTION, SOLUTION INTRAVENOUS PRN
Status: CANCELLED | OUTPATIENT
Start: 2023-08-29

## 2023-08-29 RX ORDER — SODIUM CHLORIDE, SODIUM LACTATE, POTASSIUM CHLORIDE, CALCIUM CHLORIDE 600; 310; 30; 20 MG/100ML; MG/100ML; MG/100ML; MG/100ML
INJECTION, SOLUTION INTRAVENOUS CONTINUOUS PRN
Status: DISCONTINUED | OUTPATIENT
Start: 2023-08-29 | End: 2023-08-29 | Stop reason: SDUPTHER

## 2023-08-29 RX ORDER — SODIUM CHLORIDE 0.9 % (FLUSH) 0.9 %
5-40 SYRINGE (ML) INJECTION PRN
Status: CANCELLED | OUTPATIENT
Start: 2023-08-29

## 2023-08-29 RX ORDER — SODIUM CHLORIDE 0.9 % (FLUSH) 0.9 %
5-40 SYRINGE (ML) INJECTION EVERY 12 HOURS SCHEDULED
Status: CANCELLED | OUTPATIENT
Start: 2023-08-29

## 2023-08-29 RX ORDER — PROPOFOL 10 MG/ML
INJECTION, EMULSION INTRAVENOUS PRN
Status: DISCONTINUED | OUTPATIENT
Start: 2023-08-29 | End: 2023-08-29 | Stop reason: SDUPTHER

## 2023-08-29 RX ADMIN — PROPOFOL 20 MG: 10 INJECTION, EMULSION INTRAVENOUS at 08:33

## 2023-08-29 RX ADMIN — PROPOFOL 20 MG: 10 INJECTION, EMULSION INTRAVENOUS at 08:29

## 2023-08-29 RX ADMIN — PROPOFOL 60 MG: 10 INJECTION, EMULSION INTRAVENOUS at 08:23

## 2023-08-29 RX ADMIN — PROPOFOL 20 MG: 10 INJECTION, EMULSION INTRAVENOUS at 08:36

## 2023-08-29 RX ADMIN — PROPOFOL 20 MG: 10 INJECTION, EMULSION INTRAVENOUS at 08:26

## 2023-08-29 RX ADMIN — LIDOCAINE HYDROCHLORIDE 200 MG: 20 INJECTION, SOLUTION EPIDURAL; INFILTRATION; INTRACAUDAL; PERINEURAL at 08:23

## 2023-08-29 RX ADMIN — SODIUM CHLORIDE, POTASSIUM CHLORIDE, SODIUM LACTATE AND CALCIUM CHLORIDE: 600; 310; 30; 20 INJECTION, SOLUTION INTRAVENOUS at 08:20

## 2023-08-29 RX ADMIN — SODIUM CHLORIDE, POTASSIUM CHLORIDE, SODIUM LACTATE AND CALCIUM CHLORIDE: 600; 310; 30; 20 INJECTION, SOLUTION INTRAVENOUS at 07:43

## 2023-08-29 ASSESSMENT — PAIN SCALES - GENERAL
PAINLEVEL_OUTOF10: 0
PAINLEVEL_OUTOF10: 0

## 2023-08-29 NOTE — TELEPHONE ENCOUNTER
----- Message from Srinath Fernandez MD sent at 8/29/2023  9:06 AM EDT -----  Regarding: Repeat banding in 3  months  Repeat banding in 3  months  Thanks  Dr. Lynne Cho

## 2023-08-29 NOTE — PROGRESS NOTES
Discharge Criteria    Inpatients must meet Criteria 1 through 7. All other patients are either YES or N/A. If a NO is chosen then Anesthesia or Surgeon must be notified. 1.  Minimum 30 minutes after last dose of sedative medication. Yes      2. Systolic BP between 90 - 237. Diastolic BP between 60 - 90. Yes      3. Pulse between 60 - 120    Yes      4. Respirations between 8 - 25. Yes      5. SpO2 92% - 100%. Yes      6. Able to cough and swallow or return to baseline function. Yes      7. Alert and oriented or return to baseline mental status. Yes      8. Demonstrates controlled, coordinated movements, ambulates with steady gait, or return to baseline activity function. Yes      9. Minimal or no pain or nausea, or at a level tolerable and acceptable to patient. Yes      10. Takes and retains oral fluids as allowed. Yes      11. Procedural / perioperative site stable. Minimal or no bleeding. Yes          12. If GI endoscopy procedure, minimal or no abdominal distention or passing flatus. N/A      13. Written discharge instructions and emergency telephone number provided. Yes      14. Accompanied by a responsible adult.     Yes

## 2023-08-29 NOTE — OP NOTE
[facility]    EGD    PROCEDURE DATE: 08/29/23    REFERRING PHYSICIAN: No ref. provider found     PRIMARY CARE PROVIDER: Michelle Felipe MD    ATTENDING PHYSICIAN: Lynne Cabrera MD     HISTORY: Mr. Darshan Strickland is a 58 y.o. male who presents to the  Endoscopy unit for upper endoscopy. The patient's clinical history is remarkable for cirrhosis. He is currently medically stable and appropriate for the planned procedure. PREOPERATIVE DIAGNOSIS: Varices     PROCEDURES:   1) Transoral Upper Endoscopy. POSTOPERATIVE DIAGNOSIS: Varices     MEDICATIONS: MAC per anesthesia     EBL: None    INSTRUMENT: Olympus GIF-H190 Flexible Gastroscope. PREPARATION: The nature and character of the procedure as well as risks, benefits, and alternatives were discussed with the patient and informed consent was obtained. Complications were said to include, but were not limited to: medication allergy, medication reaction, cardiovascular and respiratory problems, bleeding, perforation, infection, and/or missed diagnosis. Following arrival in the endoscopy room, the patient was placed in the left lateral decubitus position and final time-out accomplished in the presence of the nursing staff. Baseline vital signs were obtained and reviewed, and IV sedation was subsequently initiated. FINDINGS:   Esophagus: The esophagus was inspected to the Z-line. The endoscopic exam showed 2 columns of large gastric varices. Stomach: The stomach was inspected in both forward and retroflex fashion and was appropriately distensible. The cardia, fundus, incisura, antrum and pylorus were identified via direct visualization. The endoscopic exam showed mild erythema with no ulcers. No ulcers on incisura on retroflex. No gastric varices noted. Duodenum: The proximal small bowel was inspected through the bulb, sweep, and second portion of the duodenum.  The endoscopic exam showed no ulcers or lesions from duodenal bulb to sweep

## 2023-08-29 NOTE — H&P
History and Physical    Patient's Name/Date of Birth: Jono Brower / 1960 (35 y.o.)    MRN: [id number]     Date: August 29, 2023       CHIEF COMPLAINT:     History of esophageal varices  Adam Gonzalez is a  58year old man with a past medical history of cirrhosis, diabetes mellitus II, esophageal varices, hepatitis A and B, mental disabilitywho presents for variceal screening. His last variceal screening was in 2018 by Dr. Sami Still. He had variceal banding on 04/05/2023, 05/16/2023 and presents for surveillance.  O bleeding reported       Past Medical History:   Diagnosis Date    Candidal balanitis 1/14/2016    Cirrhosis ([address])     DM type 2 (diabetes mellitus, type 2) ([address])     Esophageal varices without bleeding ([address])     Essential hypertension     Hepatitis     A and B    Mental disability     Mixed hyperlipidemia     Portal hypertension ([address])     TB (tuberculosis)     Unspecified convulsions 4/4/2023     Past Surgical History:   Procedure Laterality Date    CATARACT REMOVAL  1977    CHOLECYSTECTOMY  2007    COLONOSCOPY  03/21/2016    -polyps,diverticulosis,hemorrhoids    COLONOSCOPY  11/29/2017    -hemorrhoids    ENDOSCOPY, COLON, DIAGNOSTIC      EGD numerous times    ENDOSCOPY, COLON, DIAGNOSTIC      most recent 11-05-12    ESOPHAGOGASTRODUODENOSCOPY  02/16/2023    -esophageal varices-banding    ESOPHAGOGASTRODUODENOSCOPY W/ BANDING N/A 04/05/2023    Tricia-esophageal varices(banding)portal hypertensive gastropathy    EYE SURGERY      bilateral cataracts    NASAL POLYP SURGERY      NOSE SURGERY      [place] COLON CA SCRN NOT HI [address] IND N/A 11/29/2017    COLONOSCOPY performed by Juliann Chowdhury MD at [address] [place] [address] ESOPHAGOGASTRODUODENOSCOPY TRANSORAL DIAGNOSTIC N/A 03/14/2017    EGD ESOPHAGOGASTRODUODENOSCOPY performed by Juliann Chowdhury MD at [address] [place] [address] ESOPHAGOGASTRODUODENOSCOPY TRANSORAL DIAGNOSTIC N/A 11/29/2017    EGD ESOPHAGOGASTRODUODENOSCOPY performed by Juliann Chowdhury MD

## 2023-08-29 NOTE — ANESTHESIA POSTPROCEDURE EVALUATION
Department of Anesthesiology  Postprocedure Note    Patient: Juan Ramon Dixon  MRN: [id number]  YOB: 1960  Date of evaluation: 8/29/2023      Procedure Summary     Date: 08/29/23 Room / Location: [address] [place] [address]    Anesthesia Start: Yuli Sam Anesthesia Stop: 7973    Procedure: EGD BAND LIGATION Diagnosis:       Esophageal varices without bleeding, unspecified esophageal varices type ([address])      (Esophageal varices without bleeding, unspecified esophageal varices type ([address]) [I85.00])    Surgeons: Inder Ricci MD Responsible Provider: SUZANNE Frost - CRNA    Anesthesia Type: general ASA Status: 3          Anesthesia Type: No value filed.     Luisa Phase I: Lusia Score: 10    Luisa Phase II: Luisa Score: 9      Anesthesia Post Evaluation    Patient location during evaluation: PACU  Patient participation: complete - patient participated  Level of consciousness: awake and alert  Airway patency: patent  Nausea & Vomiting: no nausea and no vomiting  Complications: no  Cardiovascular status: blood pressure returned to baseline and hemodynamically stable  Respiratory status: acceptable and room air  Hydration status: euvolemic  Pain management: adequate

## 2023-08-29 NOTE — ANESTHESIA PRE PROCEDURE
Department of Anesthesiology  Preprocedure Note       Name:  Cam Thomas   Age:  58 y.o.  :  1960                                          MRN:  [id number]         Date:  2023      Surgeon: Micha Underwood):  Srinath Fernandez MD    Procedure: Procedure(s):  EGD ESOPHAGOGASTRODUODENOSCOPY    Medications prior to admission:   Prior to Admission medications    Medication Sig Start Date End Date Taking? Authorizing Provider   lisinopril (PRINIVIL;ZESTRIL) 2.5 MG tablet Take 1 tablet by mouth daily 23   Candido Signs, MD   simvastatin (ZOCOR) 20 MG tablet Take 1 tablet by mouth nightly 23   Candido Signs, MD   Multiple Vitamin (DAILY-BEHZAD MULTIVITAMIN) TABS Take 1 tablet by mouth daily 23   Canddio Signs, MD   docusate sodium (COLACE) 100 MG capsule Take 1 capsule by mouth 2 times daily 23   Candido Signs, MD   ferrous gluconate (FERGON) 324 (38 Fe) MG tablet Take 1 tablet by mouth daily (with breakfast) 23   Candido Signs, MD   carbamide peroxide (EAR DROPS) 6.5 % otic solution Place 4 drops into both ears Twice a Week 23   Candido Signs, MD   EPINEPHrine [facility]) 0.3 MG/0.3ML SOAJ injection Inject 0.3 mLs into the muscle once for 1 dose As directed for bee sting 23  Candido Signs, MD   metoprolol tartrate (LOPRESSOR) 25 MG tablet Take 1 tablet by mouth daily 23   Candido Signs, MD   Insulin Pen Needle (BD ULTRA-FINE PEN NEEDLES) 29G X 12.7MM MISC 1 each by Does not apply route daily 23   Candido Signs, MD   Insulin Pen Needle (BD ULTRA-FINE PEN NEEDLES) 29G X 12.7MM MISC 1 each by Does not apply route daily 23   Candido Signs, MD   insulin aspart (NOVOLOG FLEXPEN) 100 UNIT/ML injection pen Inject 8 Units into the skin 3 times daily (before meals) Sliding scale. Gets 8 units TID and extra if necessary.     Historical Provider, MD   ONE TOUCH ULTRASOFT LANCETS MISC 1 each by Does not apply route 3 times daily 3/7/23   Sapna Brand

## 2023-09-09 NOTE — TELEPHONE ENCOUNTER
----- Message from Elkin Enciso RN sent at 3/10/2019  9:17 PM EDT -----  YSABEL 3, uncontrolled DM 17

## 2023-09-13 ENCOUNTER — HOSPITAL ENCOUNTER (OUTPATIENT)
Age: 63
Discharge: HOME | End: 2023-09-13
Payer: MEDICARE

## 2023-09-13 LAB
ALBUMIN SERPL-MCNC: 4 G/DL (ref 3.5–5.2)
ANION GAP SERPL CALCULATED.3IONS-SCNC: 8 MMOL/L (ref 9–17)
BUN SERPL-MCNC: 15 MG/DL (ref 8–23)
BUN/CREAT SERPL: 13 (ref 9–20)
CALCIUM SERPL-MCNC: 9 MG/DL (ref 8.6–10.4)
CHLORIDE SERPL-SCNC: 107 MMOL/L (ref 98–107)
CO2 SERPL-SCNC: 26 MMOL/L (ref 20–31)
CREAT SERPL-MCNC: 1.2 MG/DL (ref 0.7–1.2)
GFR SERPL CREATININE-BSD FRML MDRD: >60 ML/MIN/1.73M2
GLUCOSE SERPL-MCNC: 114 MG/DL (ref 70–99)
PHOSPHATE SERPL-MCNC: 3.5 MG/DL (ref 2.5–4.5)
POTASSIUM SERPL-SCNC: 4.3 MMOL/L (ref 3.7–5.3)
SODIUM SERPL-SCNC: 141 MMOL/L (ref 135–144)

## 2023-09-13 PROCEDURE — 82043 UR ALBUMIN QUANTITATIVE: CPT

## 2023-09-13 PROCEDURE — 82306 VITAMIN D 25 HYDROXY: CPT

## 2023-09-13 PROCEDURE — 84681 ASSAY OF C-PEPTIDE: CPT

## 2023-09-13 PROCEDURE — 36415 COLL VENOUS BLD VENIPUNCTURE: CPT

## 2023-09-13 PROCEDURE — 80069 RENAL FUNCTION PANEL: CPT

## 2023-09-13 PROCEDURE — 80061 LIPID PANEL: CPT

## 2023-09-13 PROCEDURE — 82570 ASSAY OF URINE CREATININE: CPT

## 2023-09-14 LAB
25(OH)D3 SERPL-MCNC: 23.3 NG/ML
CHOLEST SERPL-MCNC: 102 MG/DL
CHOLESTEROL/HDL RATIO: 2.2
CREAT UR-MCNC: 106 MG/DL (ref 39–259)
HDLC SERPL-MCNC: 46 MG/DL
LDLC SERPL CALC-MCNC: 31 MG/DL (ref 0–130)
MICROALBUMIN UR-MCNC: <12 MG/L (ref 0–20)
MICROALBUMIN/CREAT UR-RTO: NORMAL MCG/MG CREAT (ref 0–17)
TRIGL SERPL-MCNC: 124 MG/DL

## 2023-09-16 LAB — C PEPTIDE SERPL-MCNC: 1.2 NG/ML (ref 1.1–4.4)

## 2023-10-27 NOTE — PROGRESS NOTES
Writer spoke with Millicent, caregiver at group home. Instructions faxed to facility for upcoming appointment, encouraged to call back with any questions. Millicent verbalized understanding.

## 2023-11-07 ENCOUNTER — TELEPHONE (OUTPATIENT)
Dept: GASTROENTEROLOGY | Age: 63
End: 2023-11-07

## 2023-11-07 ENCOUNTER — ANESTHESIA EVENT (OUTPATIENT)
Dept: OPERATING ROOM | Age: 63
End: 2023-11-07
Payer: MEDICARE

## 2023-11-07 DIAGNOSIS — D69.6 THROMBOCYTOPENIA (HCC): Primary | ICD-10-CM

## 2023-11-07 NOTE — TELEPHONE ENCOUNTER
Spoke to staff at [facility] to let them know that patient needs to come into hospital to have labs done prior to scope. If platelets are low patient will be unable to have scope done and he will get transfusion. Staff aware, will have patient here at appropriate time.

## 2023-11-07 NOTE — TELEPHONE ENCOUNTER
----- Message from Angel Luis Valencia MD sent at 11/7/2023  2:47 PM EST -----  Regarding: Pls call and notify patient regarding plt count  Pls call and notify patient regarding platelet count needed at 8 am   Order is in.   Thanks  Dr. Mo Banda

## 2023-11-07 NOTE — PROGRESS NOTES
Discussed patient with Dr. Jonathan Méndez. Most recent platelet count 51 on 10/09/23. Dr. Jonathan Méndez is going to order platelet level morning of surgery along with transfusion if level <50. Patient and caregivers to be updated on POC per preop/scheduling staff.

## 2023-11-08 ENCOUNTER — HOSPITAL ENCOUNTER (OUTPATIENT)
Age: 63
Setting detail: OUTPATIENT SURGERY
Discharge: HOME | End: 2023-11-08
Attending: INTERNAL MEDICINE | Admitting: INTERNAL MEDICINE
Payer: MEDICARE

## 2023-11-08 ENCOUNTER — TELEPHONE (OUTPATIENT)
Dept: GASTROENTEROLOGY | Age: 63
End: 2023-11-08

## 2023-11-08 ENCOUNTER — HOSPITAL ENCOUNTER (OUTPATIENT)
Age: 63
Discharge: HOME | End: 2023-11-08
Payer: MEDICARE

## 2023-11-08 ENCOUNTER — ANESTHESIA (OUTPATIENT)
Dept: OPERATING ROOM | Age: 63
End: 2023-11-08
Payer: MEDICARE

## 2023-11-08 VITALS
DIASTOLIC BLOOD PRESSURE: 71 MMHG | OXYGEN SATURATION: 96 % | RESPIRATION RATE: 16 BRPM | HEIGHT: 72 IN | WEIGHT: 207 LBS | SYSTOLIC BLOOD PRESSURE: 150 MMHG | HEART RATE: 62 BPM | BODY MASS INDEX: 28.04 KG/M2 | TEMPERATURE: 97.1 F

## 2023-11-08 DIAGNOSIS — D69.6 THROMBOCYTOPENIA (HCC): ICD-10-CM

## 2023-11-08 DIAGNOSIS — E11.9 TYPE 2 DIABETES MELLITUS WITHOUT COMPLICATION, WITHOUT LONG-TERM CURRENT USE OF INSULIN (HCC): ICD-10-CM

## 2023-11-08 DIAGNOSIS — Z12.5 SCREENING FOR PROSTATE CANCER: ICD-10-CM

## 2023-11-08 DIAGNOSIS — E78.2 MIXED HYPERLIPIDEMIA: ICD-10-CM

## 2023-11-08 DIAGNOSIS — D50.9 IRON DEFICIENCY ANEMIA, UNSPECIFIED IRON DEFICIENCY ANEMIA TYPE: ICD-10-CM

## 2023-11-08 LAB
ABO + RH BLD: NORMAL
ALBUMIN SERPL-MCNC: 4 G/DL (ref 3.5–5.2)
ALBUMIN/GLOB SERPL: 1.2 {RATIO} (ref 1–2.5)
ALP SERPL-CCNC: 108 U/L (ref 40–129)
ALT SERPL-CCNC: 18 U/L (ref 5–41)
ANION GAP SERPL CALCULATED.3IONS-SCNC: 8 MMOL/L (ref 9–17)
ARM BAND NUMBER: NORMAL
AST SERPL-CCNC: 22 U/L
BASOPHILS # BLD: 0.04 K/UL (ref 0–0.2)
BASOPHILS NFR BLD: 1 % (ref 0–2)
BILIRUB SERPL-MCNC: 0.9 MG/DL (ref 0.3–1.2)
BLOOD BANK SAMPLE EXPIRATION: NORMAL
BLOOD GROUP ANTIBODIES SERPL: NEGATIVE
BUN SERPL-MCNC: 14 MG/DL (ref 8–23)
BUN/CREAT SERPL: 12 (ref 9–20)
CALCIUM SERPL-MCNC: 9 MG/DL (ref 8.6–10.4)
CHLORIDE SERPL-SCNC: 109 MMOL/L (ref 98–107)
CHOLEST SERPL-MCNC: 110 MG/DL
CHOLESTEROL/HDL RATIO: 2.4
CO2 SERPL-SCNC: 25 MMOL/L (ref 20–31)
CREAT SERPL-MCNC: 1.2 MG/DL (ref 0.7–1.2)
EOSINOPHIL # BLD: 0.07 K/UL (ref 0–0.44)
EOSINOPHILS RELATIVE PERCENT: 2 % (ref 1–4)
ERYTHROCYTE [DISTWIDTH] IN BLOOD BY AUTOMATED COUNT: 12.8 % (ref 11.8–14.4)
EST. AVERAGE GLUCOSE BLD GHB EST-MCNC: 154 MG/DL
FERRITIN SERPL-MCNC: 71 NG/ML (ref 30–400)
GFR SERPL CREATININE-BSD FRML MDRD: >60 ML/MIN/1.73M2
GLUCOSE BLD-MCNC: 116 MG/DL (ref 74–100)
GLUCOSE P FAST SERPL-MCNC: 122 MG/DL (ref 70–99)
HBA1C MFR BLD: 7 % (ref 4–6)
HCT VFR BLD AUTO: 38.1 % (ref 40.7–50.3)
HDLC SERPL-MCNC: 45 MG/DL
HGB BLD-MCNC: 13.1 G/DL (ref 13–17)
IMM GRANULOCYTES # BLD AUTO: 0 K/UL (ref 0–0.3)
IMM GRANULOCYTES NFR BLD: 0 %
IRON SATN MFR SERPL: 26 % (ref 20–55)
IRON SERPL-MCNC: 74 UG/DL (ref 59–158)
LDLC SERPL CALC-MCNC: 50 MG/DL (ref 0–130)
LYMPHOCYTES NFR BLD: 0.72 K/UL (ref 1.1–3.7)
LYMPHOCYTES RELATIVE PERCENT: 20 % (ref 24–43)
MCH RBC QN AUTO: 32.9 PG (ref 25.2–33.5)
MCHC RBC AUTO-ENTMCNC: 34.4 G/DL (ref 28.4–34.8)
MCV RBC AUTO: 95.7 FL (ref 82.6–102.9)
MONOCYTES NFR BLD: 0.4 K/UL (ref 0.1–1.2)
MONOCYTES NFR BLD: 11 % (ref 3–12)
MORPHOLOGY: ABNORMAL
NEUTROPHILS NFR BLD: 66 % (ref 36–65)
NEUTS SEG NFR BLD: 2.37 K/UL (ref 1.5–8.1)
NRBC BLD-RTO: 0 PER 100 WBC
PLATELET # BLD AUTO: ABNORMAL K/UL (ref 138–453)
PLATELET, FLUORESCENCE: 59 K/UL (ref 138–453)
PLATELETS.RETICULATED NFR BLD AUTO: 3.7 % (ref 1.1–10.3)
POTASSIUM SERPL-SCNC: 4.6 MMOL/L (ref 3.7–5.3)
PROT SERPL-MCNC: 7.4 G/DL (ref 6.4–8.3)
RBC # BLD AUTO: 3.98 M/UL (ref 4.21–5.77)
SODIUM SERPL-SCNC: 142 MMOL/L (ref 135–144)
TIBC SERPL-MCNC: 289 UG/DL (ref 250–450)
TRIGL SERPL-MCNC: 75 MG/DL
UNSATURATED IRON BINDING CAPACITY: 215 UG/DL (ref 112–347)
WBC OTHER # BLD: 3.6 K/UL (ref 3.5–11.3)

## 2023-11-08 PROCEDURE — 3700000001 HC ADD 15 MINUTES (ANESTHESIA): Performed by: INTERNAL MEDICINE

## 2023-11-08 PROCEDURE — 6360000002 HC RX W HCPCS: Performed by: NURSE ANESTHETIST, CERTIFIED REGISTERED

## 2023-11-08 PROCEDURE — 2709999900 HC NON-CHARGEABLE SUPPLY: Performed by: INTERNAL MEDICINE

## 2023-11-08 PROCEDURE — 83036 HEMOGLOBIN GLYCOSYLATED A1C: CPT

## 2023-11-08 PROCEDURE — 7100000011 HC PHASE II RECOVERY - ADDTL 15 MIN: Performed by: INTERNAL MEDICINE

## 2023-11-08 PROCEDURE — 3609012300 HC EGD BAND LIGATION ESOPHGEAL/GASTRIC VARICES: Performed by: INTERNAL MEDICINE

## 2023-11-08 PROCEDURE — 80053 COMPREHEN METABOLIC PANEL: CPT

## 2023-11-08 PROCEDURE — 82947 ASSAY GLUCOSE BLOOD QUANT: CPT

## 2023-11-08 PROCEDURE — 83550 IRON BINDING TEST: CPT

## 2023-11-08 PROCEDURE — 86900 BLOOD TYPING SEROLOGIC ABO: CPT

## 2023-11-08 PROCEDURE — 7100000010 HC PHASE II RECOVERY - FIRST 15 MIN: Performed by: INTERNAL MEDICINE

## 2023-11-08 PROCEDURE — 86850 RBC ANTIBODY SCREEN: CPT

## 2023-11-08 PROCEDURE — 82728 ASSAY OF FERRITIN: CPT

## 2023-11-08 PROCEDURE — 36415 COLL VENOUS BLD VENIPUNCTURE: CPT

## 2023-11-08 PROCEDURE — 2580000003 HC RX 258: Performed by: NURSE ANESTHETIST, CERTIFIED REGISTERED

## 2023-11-08 PROCEDURE — 86901 BLOOD TYPING SEROLOGIC RH(D): CPT

## 2023-11-08 PROCEDURE — 3700000000 HC ANESTHESIA ATTENDED CARE: Performed by: INTERNAL MEDICINE

## 2023-11-08 PROCEDURE — 85025 COMPLETE CBC W/AUTO DIFF WBC: CPT

## 2023-11-08 PROCEDURE — 83540 ASSAY OF IRON: CPT

## 2023-11-08 PROCEDURE — 80061 LIPID PANEL: CPT

## 2023-11-08 PROCEDURE — 2720000010 HC SURG SUPPLY STERILE: Performed by: INTERNAL MEDICINE

## 2023-11-08 PROCEDURE — 2500000003 HC RX 250 WO HCPCS: Performed by: NURSE ANESTHETIST, CERTIFIED REGISTERED

## 2023-11-08 PROCEDURE — 43244 EGD VARICES LIGATION: CPT | Performed by: INTERNAL MEDICINE

## 2023-11-08 RX ORDER — LIDOCAINE HYDROCHLORIDE 20 MG/ML
INJECTION, SOLUTION EPIDURAL; INFILTRATION; INTRACAUDAL; PERINEURAL PRN
Status: DISCONTINUED | OUTPATIENT
Start: 2023-11-08 | End: 2023-11-08 | Stop reason: SDUPTHER

## 2023-11-08 RX ORDER — PROPOFOL 10 MG/ML
INJECTION, EMULSION INTRAVENOUS PRN
Status: DISCONTINUED | OUTPATIENT
Start: 2023-11-08 | End: 2023-11-08 | Stop reason: SDUPTHER

## 2023-11-08 RX ORDER — SODIUM CHLORIDE, SODIUM LACTATE, POTASSIUM CHLORIDE, CALCIUM CHLORIDE 600; 310; 30; 20 MG/100ML; MG/100ML; MG/100ML; MG/100ML
INJECTION, SOLUTION INTRAVENOUS CONTINUOUS
Status: DISCONTINUED | OUTPATIENT
Start: 2023-11-08 | End: 2023-11-08 | Stop reason: HOSPADM

## 2023-11-08 RX ADMIN — PROPOFOL 20 MG: 10 INJECTION, EMULSION INTRAVENOUS at 13:40

## 2023-11-08 RX ADMIN — PROPOFOL 20 MG: 10 INJECTION, EMULSION INTRAVENOUS at 13:49

## 2023-11-08 RX ADMIN — PROPOFOL 20 MG: 10 INJECTION, EMULSION INTRAVENOUS at 13:46

## 2023-11-08 RX ADMIN — LIDOCAINE HYDROCHLORIDE 5 ML: 20 INJECTION, SOLUTION EPIDURAL; INFILTRATION; INTRACAUDAL; PERINEURAL at 13:35

## 2023-11-08 RX ADMIN — LIDOCAINE HYDROCHLORIDE 3 ML: 20 INJECTION, SOLUTION EPIDURAL; INFILTRATION; INTRACAUDAL; PERINEURAL at 13:39

## 2023-11-08 RX ADMIN — SODIUM CHLORIDE, POTASSIUM CHLORIDE, SODIUM LACTATE AND CALCIUM CHLORIDE: 600; 310; 30; 20 INJECTION, SOLUTION INTRAVENOUS at 12:38

## 2023-11-08 RX ADMIN — PROPOFOL 30 MG: 10 INJECTION, EMULSION INTRAVENOUS at 13:38

## 2023-11-08 RX ADMIN — PROPOFOL 20 MG: 10 INJECTION, EMULSION INTRAVENOUS at 13:48

## 2023-11-08 RX ADMIN — PROPOFOL 20 MG: 10 INJECTION, EMULSION INTRAVENOUS at 13:42

## 2023-11-08 RX ADMIN — PROPOFOL 30 MG: 10 INJECTION, EMULSION INTRAVENOUS at 13:43

## 2023-11-08 RX ADMIN — PROPOFOL 20 MG: 10 INJECTION, EMULSION INTRAVENOUS at 13:44

## 2023-11-08 RX ADMIN — PROPOFOL 20 MG: 10 INJECTION, EMULSION INTRAVENOUS at 13:37

## 2023-11-08 RX ADMIN — PROPOFOL 30 MG: 10 INJECTION, EMULSION INTRAVENOUS at 13:36

## 2023-11-08 ASSESSMENT — PAIN SCALES - GENERAL: PAINLEVEL_OUTOF10: 0

## 2023-11-08 NOTE — ANESTHESIA POSTPROCEDURE EVALUATION
Department of Anesthesiology  Postprocedure Note    Patient: Claudene Linea  MRN: [id number]  [address]: 1960  Date of evaluation: 11/8/2023      Procedure Summary     Date: 11/08/23 Room / Location: [address] [place] [address]    Anesthesia Start: 5632 Anesthesia Stop: 6745    Procedure: EGD ESOPHAGOGASTRODUODENOSCOPY, with banding Diagnosis:       Esophageal varices without bleeding, unspecified esophageal varices type ([address])      (Esophageal varices without bleeding, unspecified esophageal varices type ([address]) [I85.00])    Surgeons: Regina Townsend MD Responsible Provider: SUZANNE Voss CRNA    Anesthesia Type: Not recorded ASA Status: 3          Anesthesia Type: No value filed.     Luisa Phase I: Luisa Score: 10    Luisa Phase II: Luisa Score: 10      Anesthesia Post Evaluation    Patient location during evaluation: PACU  Patient participation: complete - patient participated  Level of consciousness: awake and alert  Pain score: 0  Airway patency: patent  Nausea & Vomiting: no nausea and no vomiting  Complications: no  Cardiovascular status: blood pressure returned to baseline  Respiratory status: acceptable and room air  Hydration status: stable  Comments: Sitter present, no c/o from patient  Pain management: adequate and satisfactory to patient

## 2023-11-08 NOTE — TELEPHONE ENCOUNTER
Made multiple attempts to contact [facility] where patient resides. Patient has not arrived for labwork. Group home staff was aware yesterday that patient needs to come to [facility] lab at 8 am to have platelet's checked to see if patient needs transfused prior to EGD. Will continue to try to contact.

## 2023-11-08 NOTE — PROGRESS NOTES
Patient verbalizes readiness for discharge. Discharge instructions given to patient and caregiver, Yonathan, answered all questions, and verbalized understanding of discharge instructions, copy of AVS given to [facility], caregiver. Discharge Criteria    Inpatients must meet Criteria 1 through 7. All other patients are either YES or N/A. If a NO is chosen then Anesthesia or Surgeon must be notified. 1.  Minimum 30 minutes after last dose of sedative medication. Yes      2. Systolic BP between 90 - 364. Diastolic BP between 60 - 90. Yes      3. Pulse between 60 - 120    Yes      4. Respirations between 8 - 25. Yes      5. SpO2 92% - 100%. Yes      6. Able to cough and swallow or return to baseline function. Yes      7. Alert and oriented or return to baseline mental status. Yes      8. Demonstrates controlled, coordinated movements, ambulates with steady gait, or return to baseline activity function. Yes      9. Minimal or no pain or nausea, or at a level tolerable and acceptable to patient. Yes      10. Takes and retains oral fluids as allowed. Yes      11. Procedural / perioperative site stable. Minimal or no bleeding. Yes          12. If GI endoscopy procedure, minimal or no abdominal distention or passing flatus. Yes      13. Written discharge instructions and emergency telephone number provided. Yes      14. Accompanied by a responsible adult.     Yes

## 2023-11-08 NOTE — OP NOTE
[facility]    EGD    PROCEDURE DATE: 11/08/23    REFERRING PHYSICIAN: No ref. provider found     PRIMARY CARE PROVIDER: Yassine Gil MD    ATTENDING PHYSICIAN: Charley Amin MD     HISTORY: Mr. Radha Guzmán is a 58 y.o. male who presents to the  Endoscopy unit for upper endoscopy. The patient's clinical history is remarkable for cirrhosis. He is currently medically stable and appropriate for the planned procedure. PREOPERATIVE DIAGNOSIS:      PROCEDURES:   1) Transoral Upper Endoscopy. POSTOPERATIVE DIAGNOSIS: EGD with banding     MEDICATIONS:   MAC per anesthesia      EBL: None    INSTRUMENT: Olympus GIF-H190  flexible Gastroscope. PREPARATION: The nature and character of the procedure as well as risks, benefits, and alternatives were discussed with the patient and informed consent was obtained. Complications were said to include, but were not limited to: medication allergy, medication reaction, cardiovascular and respiratory problems, bleeding, perforation, infection, and/or missed diagnosis. Following arrival in the endoscopy room, the patient was placed in the left lateral decubitus position and final time-out accomplished in the presence of the nursing staff. Baseline vital signs were obtained and reviewed, and IV sedation was subsequently initiated. FINDINGS:   Esophagus: The esophagus was inspected to the Z-line. The endoscopic exam showed 2 large distal varices - a red xenia was noted on one varix no evidence of active bleeding. GEJ was regular at 46cm from the incisor. Stomach: The stomach was inspected in both forward and retroflex fashion and was appropriately distensible. The cardia, fundus, incisura, antrum and pylorus were identified via direct visualization. The endoscopic exam showed no ulcers or lesion in then antrum or on incisura with retroflex. No hiatal hernia. Cobblestone appearance of gastric mucosa.     Duodenum: The proximal small bowel was inspected

## 2023-11-08 NOTE — H&P
History and Physical    Patient's Name/Date of Birth: Robert Aw / 1960 (58 y.o.)    MRN: [id number]     Date: November 8, 2023       CHIEF COMPLAINT:  Variceal surveillance    CHIEF COMPLAINT:    History of esophageal varices    Alda Mitchell is a  58year old man with a past medical history of cirrhosis, diabetes mellitus II, esophageal varices, hepatitis A and B, mental disabilitywho presents for variceal screening. His last variceal screening was in 2018 by Dr. Mo Friedman. He had variceal banding on 04/05/2023, 05/16/2023, 08/29/2023 and presents for surveillance. No additional interval bleeding has been reported.      Past Medical History:   Diagnosis Date    Candidal balanitis 1/14/2016    Cirrhosis ([address])     DM type 2 (diabetes mellitus, type 2) ([address])     Esophageal varices without bleeding ([address])     Essential hypertension     Hepatitis     A and B    Mental disability     Mixed hyperlipidemia     Portal hypertension ([address])     TB (tuberculosis)     Unspecified convulsions 4/4/2023     Past Surgical History:   Procedure Laterality Date    CATARACT REMOVAL  1977    CHOLECYSTECTOMY  2007    COLONOSCOPY  03/21/2016    -polyps,diverticulosis,hemorrhoids    COLONOSCOPY  11/29/2017    -hemorrhoids    ENDOSCOPY, COLON, DIAGNOSTIC      EGD numerous times    ENDOSCOPY, COLON, DIAGNOSTIC      most recent 11-05-12    ESOPHAGOGASTRODUODENOSCOPY  02/16/2023    -esophageal varices-banding    ESOPHAGOGASTRODUODENOSCOPY W/ BANDING N/A 04/05/2023    Tricia-esophageal varices(banding)portal hypertensive gastropathy    EYE SURGERY      bilateral cataracts    NASAL POLYP SURGERY      NOSE SURGERY      [place] COLON CA SCRN NOT HI [address] IND N/A 11/29/2017    COLONOSCOPY performed by Diane Lyons MD at [facility] ESOPHAGOGASTRODUODENOSCOPY TRANSORAL DIAGNOSTIC N/A 03/14/2017    EGD ESOPHAGOGASTRODUODENOSCOPY performed by iDane Lyons MD at [facility] ESOPHAGOGASTRODUODENOSCOPY TRANSORAL

## 2023-11-09 ENCOUNTER — TELEPHONE (OUTPATIENT)
Dept: GASTROENTEROLOGY | Age: 63
End: 2023-11-09

## 2023-11-22 ENCOUNTER — OFFICE VISIT (OUTPATIENT)
Dept: ONCOLOGY | Age: 63
End: 2023-11-22
Payer: MEDICARE

## 2023-11-22 VITALS
TEMPERATURE: 97.8 F | BODY MASS INDEX: 25.92 KG/M2 | DIASTOLIC BLOOD PRESSURE: 70 MMHG | HEIGHT: 78 IN | HEART RATE: 65 BPM | RESPIRATION RATE: 18 BRPM | WEIGHT: 224 LBS | SYSTOLIC BLOOD PRESSURE: 132 MMHG

## 2023-11-22 DIAGNOSIS — D61.818 PANCYTOPENIA (HCC): ICD-10-CM

## 2023-11-22 DIAGNOSIS — D50.9 IRON DEFICIENCY ANEMIA, UNSPECIFIED IRON DEFICIENCY ANEMIA TYPE: ICD-10-CM

## 2023-11-22 DIAGNOSIS — D50.9 IRON DEFICIENCY ANEMIA, UNSPECIFIED IRON DEFICIENCY ANEMIA TYPE: Primary | ICD-10-CM

## 2023-11-22 DIAGNOSIS — D69.6 THROMBOCYTOPENIA (HCC): Primary | ICD-10-CM

## 2023-11-22 PROCEDURE — G8482 FLU IMMUNIZE ORDER/ADMIN: HCPCS | Performed by: INTERNAL MEDICINE

## 2023-11-22 PROCEDURE — 3017F COLORECTAL CA SCREEN DOC REV: CPT | Performed by: INTERNAL MEDICINE

## 2023-11-22 PROCEDURE — G8419 CALC BMI OUT NRM PARAM NOF/U: HCPCS | Performed by: INTERNAL MEDICINE

## 2023-11-22 PROCEDURE — 99214 OFFICE O/P EST MOD 30 MIN: CPT | Performed by: INTERNAL MEDICINE

## 2023-11-22 PROCEDURE — 3078F DIAST BP <80 MM HG: CPT | Performed by: INTERNAL MEDICINE

## 2023-11-22 PROCEDURE — 3075F SYST BP GE 130 - 139MM HG: CPT | Performed by: INTERNAL MEDICINE

## 2023-11-22 PROCEDURE — 1036F TOBACCO NON-USER: CPT | Performed by: INTERNAL MEDICINE

## 2023-11-22 PROCEDURE — G8427 DOCREV CUR MEDS BY ELIG CLIN: HCPCS | Performed by: INTERNAL MEDICINE

## 2023-11-22 PROCEDURE — 99211 OFF/OP EST MAY X REQ PHY/QHP: CPT | Performed by: INTERNAL MEDICINE

## 2023-11-22 RX ORDER — CLOTRIMAZOLE AND BETAMETHASONE DIPROPIONATE 10; .5 MG/ML; MG/ML
1 LOTION TOPICAL 2 TIMES DAILY
COMMUNITY

## 2023-11-22 NOTE — PROGRESS NOTES
showed tinea cruris  Review of radiological studies:     Review of laboratory data:    Iron and TIBC (11/28/2020 9:21 AM EST)  Iron and TIBC (11/28/2020 9:21 AM EST)   Component Value Ref Range Performed At Pathologist Signature   Iron 104 50 - 212 ug/dL [facility]     [facility]-calc only do not order 412 250 - 425 ug/dL [facility]     Iron Saturation 25 20 - 50 % [facility]       Iron and TIBC (11/28/2020 9:21 AM EST)   Specimen   Serum     Iron and TIBC (11/28/2020 9:21 AM EST)   Performing Organization Address City/State/ZIP Code Phone Number   [address] [facility]   [facility]        Back to top of Lab Results       Ferritin (11/28/2020 9:21 AM EST)  Ferritin (11/28/2020 9:21 AM EST)   Component Value Ref Range Performed At Pathologist Signature   Ferritin 21 (L) 24 - 336 ng/mL [place] [facility]       Ferritin (11/28/2020 9:21 AM EST)   Specimen   Serum     Ferritin (11/28/2020 9:21 AM EST)   Performing Organization Address City/State/ZIP Code Phone Number   [facility] [place] [facility]   [address] [facility], [place] [phone]        Back to top of Lab Results       Comprehensive metabolic panel (94/25/8671 9:21 AM EST)  Comprehensive metabolic panel (19/27/3661 9:21 AM EST)   Component Value Ref Range Performed At Pathologist Signature   Sodium 140 134 - 146 mmol/L [facility]     Potassium, Bld 3.9 3.5 - 5.0 mmol/L [facility]     Chloride 104 98 - 109 mmol/L [facility]     CO2 27 22 - 32 mmol/L [facility]     Anion gap 9 5 - 15 mmol/L [facility]     BUN 20 5 - 27 mg/dL [facility]     Creatinine 1.09Comment: METHOD TRACEABLE TO IDMS STANDARD 0.60 - 1.30 mg/dL [facility]     Glucose 190 (H) 65 - 99 mg/dL [facility]

## 2023-11-22 NOTE — PATIENT INSTRUCTIONS
[id number] [facility]  [address] [facility] [address] [facility] [address] [facility] [address]  Phone: [phone]  Fax: [phone]      Attending Physician Attestation    I performed a history and physical examination of the patient and discussed management with the mid level provider. I reviewed the mid level provider's note and agree with the documented findings and plan of care. Any areas of disagreement are noted on the chart. I was personally present for the key portions of any procedures. I have documented in the chart those procedures where I was not present during the key portions. I have reviewed the emergency nurses triage note. I agree with the chief complaint, past medical history, past surgical history, allergies, medications, social and family history as documented unless otherwise noted below. Documentation of the HPI, Physical Exam and Medical Decision Making performed by mid level providers is based on my personal performance of the HPI, PE and MDM. For Physician Assistant/ Nurse Practitioner cases/documentation I have personally evaluated this patient and have completed at least one if not all key elements of the E/M (history, physical exam, and MDM). Additional findings are as noted. CHIEF COMPLAINT       Chief Complaint   Patient presents with    Chest Pain     Sudden onset when climbing the stairs at 1800 today. HISTORY OF PRESENT ILLNESS    Azra Miles is a [de-identified] y.o. female who presents complaining of chest pain. It started when she ascended the stairs. She was at this facility about 1 month ago for a similar episode. She cannot describe the pain. She was admitted and had serial EKGs and enzymes and her ultimate stress test which was her  reports that this is exactly the way that she presented last time. PAST MEDICAL HISTORY    has a past medical history of Hyperlipidemia and Hypertension.     SURGICAL HISTORY      has a past surgical history that includes Hysterectomy; RV 6 months with labs before RV  Continue PO iron Abdomen surgery; LIGATION OF SAPHENOUS VEIN; and bladder suspension. CURRENT MEDICATIONS       Previous Medications    ACEBUTOLOL (SECTRAL) 400 MG CAPSULE    Take 400 mg by mouth daily    ASPIRIN 81 MG CHEWABLE TABLET    Take 81 mg by mouth daily    CHOLECALCIFEROL (VITAMIN D3) 50 MCG (2000 UT) CAPS    Take by mouth    ESTRADIOL (ESTRACE) 0.5 MG TABLET    Take 0.25 mg by mouth daily    LOVASTATIN (MEVACOR) 20 MG TABLET    Take 20 mg by mouth daily    MISC NATURAL PRODUCTS (GLUCOSAMINE-CHONDROITIN PLUS PO)    Take by mouth    OMEGA-3 FATTY ACIDS (FISH OIL PO)    Take by mouth    PANTOPRAZOLE (PROTONIX) 40 MG TABLET    Take 40 mg by mouth daily       ALLERGIES     is allergic to morphine; doxycycline; azithromycin; and ciprofloxacin. FAMILY HISTORY     She indicated that her mother is . She indicated that her father is . She indicated that two of her three brothers are alive. family history includes Alzheimer's Disease (age of onset: 67) in her father; Heart Attack (age of onset: 62) in her brother; Heart Disease in her brother and brother; Heart Disease (age of onset: 78) in her mother. SOCIAL HISTORY      reports that she has quit smoking. She has never used smokeless tobacco. She reports previous alcohol use. She reports that she does not use drugs. PHYSICAL EXAM     INITIAL VITALS:  height is 5' 1.5\" (1.562 m) and weight is 59 kg (130 lb). Her oral temperature is 98.1 °F (36.7 °C). Her blood pressure is 198/102 (abnormal) and her pulse is 62. Her respiration is 16 and oxygen saturation is 100%. Patient is very anxious and tremulous on exam table. Heart is regular. Lungs are clear. Abdomen is soft and benign. No peripheral edema.       DIAGNOSTIC RESULTS     EKG: All EKG's are interpreted by the Emergency Department Physician who either signs or Co-signs this chart in the absence of a cardiologist.    EKG is interpreted by myself showing a normal sinus rhythm with left axis deviation and LVH. No acute ST segment changes. Rate and intervals are otherwise normal.    RADIOLOGY:   Non-plain film images such as CT, Ultrasound and MRI are read by the radiologist. Plain radiographic images are visualized and the radiologist interpretations are reviewed as follows:     Not performed yet    LABS:  No results found for this visit on 01/08/20. EMERGENCY DEPARTMENT COURSE:   Vitals:    Vitals:    01/08/20 1821   BP: (!) 198/102   Pulse: 62   Resp: 16   Temp: 98.1 °F (36.7 °C)   TempSrc: Oral   SpO2: 100%   Weight: 59 kg (130 lb)   Height: 5' 1.5\" (1.562 m)     -------------------------  BP: (!) 198/102, Temp: 98.1 °F (36.7 °C), Pulse: 62, Resp: 16      PERTINENT ATTENDING PHYSICIAN COMMENTS:    Serial EKGs and enzymes will be obtained. Patient to me seems very anxious and will be treated with some Ativan here. I have endorsed this patient to Dr. Laurie Underwood at 1900 hrs. Please see his continue documentation. (Please note that portions of this note were completed with a voice recognition program.  Efforts were made to edit the dictations but occasionally words are mis-transcribed.)    Mckay MD, F.A.C.E.P.   Attending Emergency Medicine Physician        Anaya Mckinley MD  01/08/20 1920

## 2023-12-13 ENCOUNTER — TELEPHONE (OUTPATIENT)
Dept: GASTROENTEROLOGY | Age: 63
End: 2023-12-13

## 2024-01-23 ENCOUNTER — TELEPHONE (OUTPATIENT)
Dept: GASTROENTEROLOGY | Age: 64
End: 2024-01-23

## 2024-03-26 ENCOUNTER — HOSPITAL ENCOUNTER (OUTPATIENT)
Age: 64
Discharge: HOME | End: 2024-03-26
Payer: MEDICARE

## 2024-03-26 ENCOUNTER — TELEPHONE (OUTPATIENT)
Dept: GASTROENTEROLOGY | Age: 64
End: 2024-03-26

## 2024-03-26 ENCOUNTER — OFFICE VISIT (OUTPATIENT)
Dept: GASTROENTEROLOGY | Age: 64
End: 2024-03-26
Payer: MEDICARE

## 2024-03-26 VITALS
RESPIRATION RATE: 18 BRPM | OXYGEN SATURATION: 97 % | BODY MASS INDEX: 27.02 KG/M2 | HEIGHT: 78 IN | SYSTOLIC BLOOD PRESSURE: 122 MMHG | HEART RATE: 73 BPM | DIASTOLIC BLOOD PRESSURE: 70 MMHG | WEIGHT: 233.5 LBS

## 2024-03-26 DIAGNOSIS — D69.6 THROMBOCYTOPENIA (HCC): ICD-10-CM

## 2024-03-26 DIAGNOSIS — K59.00 CONSTIPATION, UNSPECIFIED CONSTIPATION TYPE: ICD-10-CM

## 2024-03-26 DIAGNOSIS — Z12.11 COLON CANCER SCREENING: ICD-10-CM

## 2024-03-26 DIAGNOSIS — K76.6 PORTAL HYPERTENSION (HCC): ICD-10-CM

## 2024-03-26 DIAGNOSIS — K74.60 HEPATIC CIRRHOSIS, UNSPECIFIED HEPATIC CIRRHOSIS TYPE, UNSPECIFIED WHETHER ASCITES PRESENT (HCC): ICD-10-CM

## 2024-03-26 DIAGNOSIS — Z80.0 FAMILY HISTORY OF COLON CANCER: ICD-10-CM

## 2024-03-26 DIAGNOSIS — K74.60 HEPATIC CIRRHOSIS, UNSPECIFIED HEPATIC CIRRHOSIS TYPE, UNSPECIFIED WHETHER ASCITES PRESENT (HCC): Primary | ICD-10-CM

## 2024-03-26 DIAGNOSIS — I85.00 ESOPHAGEAL VARICES WITHOUT BLEEDING, UNSPECIFIED ESOPHAGEAL VARICES TYPE (HCC): ICD-10-CM

## 2024-03-26 DIAGNOSIS — Z86.19 HISTORY OF HEPATITIS B: ICD-10-CM

## 2024-03-26 LAB
ALBUMIN SERPL-MCNC: 3.8 G/DL (ref 3.5–5.2)
ALBUMIN/GLOB SERPL: 1.1 {RATIO} (ref 1–2.5)
ALP SERPL-CCNC: 106 U/L (ref 40–129)
ALT SERPL-CCNC: 17 U/L (ref 5–41)
ANION GAP SERPL CALCULATED.3IONS-SCNC: 10 MMOL/L (ref 9–17)
AST SERPL-CCNC: 23 U/L
BASOPHILS # BLD: 0 K/UL (ref 0–0.2)
BASOPHILS NFR BLD: 0 % (ref 0–2)
BILIRUB DIRECT SERPL-MCNC: 0.2 MG/DL
BILIRUB INDIRECT SERPL-MCNC: 0.6 MG/DL (ref 0–1)
BILIRUB SERPL-MCNC: 0.8 MG/DL (ref 0.3–1.2)
BUN SERPL-MCNC: 16 MG/DL (ref 8–23)
CALCIUM SERPL-MCNC: 9.1 MG/DL (ref 8.6–10.4)
CHLORIDE SERPL-SCNC: 106 MMOL/L (ref 98–107)
CO2 SERPL-SCNC: 23 MMOL/L (ref 20–31)
CREAT SERPL-MCNC: 1.2 MG/DL (ref 0.7–1.2)
EOSINOPHIL # BLD: 0.06 K/UL (ref 0–0.44)
EOSINOPHILS RELATIVE PERCENT: 2 % (ref 1–4)
ERYTHROCYTE [DISTWIDTH] IN BLOOD BY AUTOMATED COUNT: 12.6 % (ref 11.8–14.4)
GFR SERPL CREATININE-BSD FRML MDRD: 67 ML/MIN/1.73M2
GLUCOSE SERPL-MCNC: 136 MG/DL (ref 70–99)
HCT VFR BLD AUTO: 37.4 % (ref 40.7–50.3)
HGB BLD-MCNC: 12.6 G/DL (ref 13–17)
IMM GRANULOCYTES # BLD AUTO: 0 K/UL (ref 0–0.3)
IMM GRANULOCYTES NFR BLD: 0 %
INR PPP: 1.6
LYMPHOCYTES NFR BLD: 0.65 K/UL (ref 1.1–3.7)
LYMPHOCYTES RELATIVE PERCENT: 21 % (ref 24–43)
MCH RBC QN AUTO: 33 PG (ref 25.2–33.5)
MCHC RBC AUTO-ENTMCNC: 33.7 G/DL (ref 28.4–34.8)
MCV RBC AUTO: 97.9 FL (ref 82.6–102.9)
MONOCYTES NFR BLD: 0.37 K/UL (ref 0.1–1.2)
MONOCYTES NFR BLD: 12 % (ref 3–12)
MORPHOLOGY: ABNORMAL
NEUTROPHILS NFR BLD: 65 % (ref 36–65)
NEUTS SEG NFR BLD: 2.02 K/UL (ref 1.5–8.1)
NRBC BLD-RTO: 0 PER 100 WBC
PLATELET # BLD AUTO: ABNORMAL K/UL (ref 138–453)
PLATELET, FLUORESCENCE: 56 K/UL (ref 138–453)
PLATELETS.RETICULATED NFR BLD AUTO: 3 % (ref 1.1–10.3)
POTASSIUM SERPL-SCNC: 4.2 MMOL/L (ref 3.7–5.3)
PROT SERPL-MCNC: 7.3 G/DL (ref 6.4–8.3)
PROTHROMBIN TIME: 18.5 SEC (ref 11.7–14.1)
RBC # BLD AUTO: 3.82 M/UL (ref 4.21–5.77)
SODIUM SERPL-SCNC: 139 MMOL/L (ref 135–144)
WBC OTHER # BLD: 3.1 K/UL (ref 3.5–11.3)

## 2024-03-26 PROCEDURE — 85025 COMPLETE CBC W/AUTO DIFF WBC: CPT

## 2024-03-26 PROCEDURE — 3078F DIAST BP <80 MM HG: CPT | Performed by: NURSE PRACTITIONER

## 2024-03-26 PROCEDURE — 82105 ALPHA-FETOPROTEIN SERUM: CPT

## 2024-03-26 PROCEDURE — 99213 OFFICE O/P EST LOW 20 MIN: CPT | Performed by: NURSE PRACTITIONER

## 2024-03-26 PROCEDURE — 82248 BILIRUBIN DIRECT: CPT

## 2024-03-26 PROCEDURE — G8427 DOCREV CUR MEDS BY ELIG CLIN: HCPCS | Performed by: NURSE PRACTITIONER

## 2024-03-26 PROCEDURE — G8482 FLU IMMUNIZE ORDER/ADMIN: HCPCS | Performed by: NURSE PRACTITIONER

## 2024-03-26 PROCEDURE — 36415 COLL VENOUS BLD VENIPUNCTURE: CPT

## 2024-03-26 PROCEDURE — 85610 PROTHROMBIN TIME: CPT

## 2024-03-26 PROCEDURE — 80053 COMPREHEN METABOLIC PANEL: CPT

## 2024-03-26 PROCEDURE — G8417 CALC BMI ABV UP PARAM F/U: HCPCS | Performed by: NURSE PRACTITIONER

## 2024-03-26 PROCEDURE — 3074F SYST BP LT 130 MM HG: CPT | Performed by: NURSE PRACTITIONER

## 2024-03-26 PROCEDURE — 3017F COLORECTAL CA SCREEN DOC REV: CPT | Performed by: NURSE PRACTITIONER

## 2024-03-26 PROCEDURE — 1036F TOBACCO NON-USER: CPT | Performed by: NURSE PRACTITIONER

## 2024-03-26 PROCEDURE — 1123F ACP DISCUSS/DSCN MKR DOCD: CPT | Performed by: NURSE PRACTITIONER

## 2024-03-26 RX ORDER — POLYETHYLENE GLYCOL 3350, SODIUM SULFATE ANHYDROUS, SODIUM BICARBONATE, SODIUM CHLORIDE, POTASSIUM CHLORIDE 236; 22.74; 6.74; 5.86; 2.97 G/4L; G/4L; G/4L; G/4L; G/4L
4 POWDER, FOR SOLUTION ORAL ONCE
Qty: 4000 ML | Refills: 0 | Status: SHIPPED | OUTPATIENT
Start: 2024-03-26 | End: 2024-03-26

## 2024-03-26 NOTE — PATIENT INSTRUCTIONS
SURVEY:    You may be receiving a survey from [facility] your visit today.    You may get this in the mail, through your MyChart, or in your email.     Please complete the survey to enable us to provide the highest quality of care to you and your family.    If you cannot score us a very good (5 Stars) on any question, please call the office to discuss how we could of made your experience exceptional.    Thank you!    MD Shanna Barclay, APRN-KARLA Ojeda LPN Michelle, LPN    Phone: [phone]  Fax: [phone]    Office Hours:   M-TH 8-5, F: 8-12

## 2024-03-26 NOTE — TELEPHONE ENCOUNTER
Navirosendo Hammer     1960        male    [address]  [facility] [address]                    Legal Guardian no   If yes, Name:       Skilled Facility no     If yes, Name:                                             Home Phone: [phone]         Cell Phone:    Telephone Information:   Mobile [phone]                                           Surgeon: Hilaria Surgery Date: 8/14/24                    Time:     Procedure: Colonoscopy/EGD   Duration:    Diagnosis: screening, esophageal varcies    CPT Codes: g0121 , 98312    Important Medical History:  In Epic    First Assistant no  Special Inst/Equip/Implants: Regular    Nickel allergy :    no  Latex Allergy: No         Cardiac Device:  No  If yes, need most recent pacemaker interrogation from Cardiologist:  Type of pacemaker:    Anesthesia:    MAC                       Admission Type:  Same Day                        Admit Prior to Day of Surgery: No    Case Location:  Ambulatory            Preadmission Testing:  Phone Call             PAT Date and Time: tbd    Need Preop Cardiac Clearance: No  Need Pre-op/Medical Clearance:no    Does Patient have Cardiologist/physician? Name of Physician:        Special Needs Communication:  Altagracia Lift no          needed no            Does patient sign for self

## 2024-03-26 NOTE — PROGRESS NOTES
up to reschedule EGD and colon cancer screening colonoscopy.  He is accompanied by his caretaker, Seda who provides most of his HPI.  He has a history of thrombocytopenia, will need platelet count before procedures.  Recommend labs be checked today.  Review of EMR, he is due for HCC imaging and labs, recommend to have done today and a CBC, CMP, INR-PT again in 3 months.   He has a history of Hepatitis B, appears that the previously ordered Hepatitis B DNA was not completed, will recommend today.   Discussed referral to hepatology, will await on updated labs.    Return in about 4 weeks (around 4/23/2024).    ASA: 2  Mallampati Score: 2    Plan      1. Hepatic cirrhosis, unspecified hepatic cirrhosis type, unspecified whether ascites present (HCC)  - AFP Tumor Marker; Future  - CBC with Auto Differential; Future  - Comprehensive Metabolic Panel with Bilirubin; Future  - Protime-INR; Future  - US LIVER; Future  - [facility]    2. Esophageal varices without bleeding, unspecified esophageal varices type (HCC)  - EGD Routine; Future  - Recommend CBC today to confirm platelet count    3. Portal hypertension (HCC)  - EGD Routine; Future  - Recommend CBC today to confirm platelet count    4. Thrombocytopenia (HCC)  - CBC with Auto Differential; Future    5. Constipation, unspecified constipation type  - [facility]  - polyethylene glycol (GOLYTELY) 236 g solution; Take 4,000 mLs by mouth once for 1 dose  Dispense: 4000 mL; Refill: 0  - Discussed recommendation to increase fluid, fiber intake and physical activity.   - Start Miralax powder OTC as labeled, once daily and titrate up or down (to a maximum of 34 g daily) to effect.  - Start fiber (ex. Metamucil) titrate up to 2-3 times per day as needed.  - Continue stool softener (ex. Colace OTC as labeled)   - Recommend extended bowel prep    6. Colon cancer screening  - COLONOSCOPY (Screening); Future  -

## 2024-03-27 LAB — AFP SERPL-MCNC: 3.3 UG/L

## 2024-04-10 ENCOUNTER — TELEPHONE (OUTPATIENT)
Dept: GASTROENTEROLOGY | Age: 64
End: 2024-04-10

## 2024-04-10 NOTE — TELEPHONE ENCOUNTER
Patient lives in a group home. Millicent, staff member with [facility] calling and would like a dicontinue order for the miralax to take it off his MAR.  Fax: [phone]  She states that he has a BM daily without the medication.

## 2024-04-16 ENCOUNTER — HOSPITAL ENCOUNTER (OUTPATIENT)
Dept: ULTRASOUND IMAGING | Age: 64
Discharge: HOME | End: 2024-04-18
Payer: MEDICARE

## 2024-04-16 DIAGNOSIS — K74.60 HEPATIC CIRRHOSIS, UNSPECIFIED HEPATIC CIRRHOSIS TYPE, UNSPECIFIED WHETHER ASCITES PRESENT (HCC): ICD-10-CM

## 2024-04-16 PROCEDURE — 76705 ECHO EXAM OF ABDOMEN: CPT

## 2024-04-18 ENCOUNTER — TELEPHONE (OUTPATIENT)
Dept: GASTROENTEROLOGY | Age: 64
End: 2024-04-18

## 2024-04-18 NOTE — TELEPHONE ENCOUNTER
----- Message from SUZANNE Bowen - CNP sent at 4/16/2024 12:12 PM EDT -----  Please let Navi and or his nurse know that his liver ultrasound returned with known cirrhosis without lesions.  No ascites.  Thanks, Shanna

## 2024-04-22 ENCOUNTER — TELEPHONE (OUTPATIENT)
Dept: GASTROENTEROLOGY | Age: 64
End: 2024-04-22

## 2024-04-22 NOTE — TELEPHONE ENCOUNTER
Please reschedule patient for 09/26/24.     Spoke with [facility] Staff Cassi. In agreement to new date. I have given and reviewed all prep instructions with staff member.

## 2024-05-29 ENCOUNTER — OFFICE VISIT (OUTPATIENT)
Dept: ONCOLOGY | Age: 64
End: 2024-05-29
Payer: MEDICARE

## 2024-05-29 VITALS
SYSTOLIC BLOOD PRESSURE: 124 MMHG | BODY MASS INDEX: 25.33 KG/M2 | DIASTOLIC BLOOD PRESSURE: 61 MMHG | HEART RATE: 59 BPM | RESPIRATION RATE: 18 BRPM | WEIGHT: 222 LBS | TEMPERATURE: 97.6 F

## 2024-05-29 DIAGNOSIS — D50.9 IRON DEFICIENCY ANEMIA, UNSPECIFIED IRON DEFICIENCY ANEMIA TYPE: ICD-10-CM

## 2024-05-29 DIAGNOSIS — D61.818 PANCYTOPENIA (HCC): ICD-10-CM

## 2024-05-29 DIAGNOSIS — D61.818 PANCYTOPENIA (HCC): Primary | ICD-10-CM

## 2024-05-29 DIAGNOSIS — D69.6 THROMBOCYTOPENIA (HCC): ICD-10-CM

## 2024-05-29 PROCEDURE — 3074F SYST BP LT 130 MM HG: CPT | Performed by: INTERNAL MEDICINE

## 2024-05-29 PROCEDURE — 3078F DIAST BP <80 MM HG: CPT | Performed by: INTERNAL MEDICINE

## 2024-05-29 PROCEDURE — 1036F TOBACCO NON-USER: CPT | Performed by: INTERNAL MEDICINE

## 2024-05-29 PROCEDURE — G8427 DOCREV CUR MEDS BY ELIG CLIN: HCPCS | Performed by: INTERNAL MEDICINE

## 2024-05-29 PROCEDURE — 99211 OFF/OP EST MAY X REQ PHY/QHP: CPT | Performed by: INTERNAL MEDICINE

## 2024-05-29 PROCEDURE — 3017F COLORECTAL CA SCREEN DOC REV: CPT | Performed by: INTERNAL MEDICINE

## 2024-05-29 PROCEDURE — 99214 OFFICE O/P EST MOD 30 MIN: CPT | Performed by: INTERNAL MEDICINE

## 2024-05-29 PROCEDURE — G8417 CALC BMI ABV UP PARAM F/U: HCPCS | Performed by: INTERNAL MEDICINE

## 2024-05-29 PROCEDURE — 1123F ACP DISCUSS/DSCN MKR DOCD: CPT | Performed by: INTERNAL MEDICINE

## 2024-05-29 NOTE — PROGRESS NOTES
% eosinophils 1.5 % [facility]     % Basophils 0.7 % [facility]     Neutrophils Absolute (A) 2.6 1.5 - 6.6 X10E9/L [facility]     Lymphocytes Absolute 0.8 (L) 1.0 - 3.5 X10E9/L [facility]     Monocytes Absolute 0.5 0 - 0.9 X10E9/L [facility]     Eosinophils Absolute 0.1 0.0 - 0.4 X10E9/L [facility]     Basophils Absolute 0.0 0.0 - 0.2 X10E9/L [facility]       CBC auto differential (11/28/2020 9:21 AM EST)   Specimen     Lab Results   Component Value Date    WBC 3.1 (L) 03/26/2024    HGB 12.6 (L) 03/26/2024    HCT 37.4 (L) 03/26/2024    MCV 97.9 03/26/2024    PLT See Reflexed IPF Result 03/26/2024       Chemistry        Component Value Date/Time     03/26/2024 1242    K 4.2 03/26/2024 1242     03/26/2024 1242    CO2 23 03/26/2024 1242    BUN 16 03/26/2024 1242    CREATININE 1.2 03/26/2024 1242        Component Value Date/Time    CALCIUM 9.1 03/26/2024 1242    ALKPHOS 106 03/26/2024 1242    AST 23 03/26/2024 1242    ALT 17 03/26/2024 1242    BILITOT 0.8 03/26/2024 1242              IMPRESSION:   Pancytopenia related to liver disease, labs are stable  Patient is relatively symptomatic from his thrombocytopenia  Anemia, possibly related to liver disease.  With concomitant iron deficiency  Plan:   The patient platelets and liver function are stable.  White blood cells and red blood cells are stable.  Platelets are stable..  We will continue observation and oral iron without changes.   Continue oral iron.   We will see him back in 6 months for follow-up.                              MD Em [facility]                            This note is created with the assistance of a speech recognition program.  While intending to generate a document that actually reflects the content of the visit, the document can still have some errors

## 2024-06-19 PROBLEM — M75.41 ROTATOR CUFF IMPINGEMENT SYNDROME OF RIGHT SHOULDER: Status: RESOLVED | Noted: 2019-01-18 | Resolved: 2024-06-19

## 2024-11-11 ENCOUNTER — OFFICE VISIT (OUTPATIENT)
Dept: SURGERY | Age: 64
End: 2024-11-11
Payer: MEDICARE

## 2024-11-11 VITALS — SYSTOLIC BLOOD PRESSURE: 108 MMHG | DIASTOLIC BLOOD PRESSURE: 66 MMHG | BODY MASS INDEX: 24.53 KG/M2 | WEIGHT: 215 LBS

## 2024-11-11 DIAGNOSIS — K74.60 HEPATIC CIRRHOSIS, UNSPECIFIED HEPATIC CIRRHOSIS TYPE, UNSPECIFIED WHETHER ASCITES PRESENT (HCC): Primary | ICD-10-CM

## 2024-11-11 DIAGNOSIS — K76.6 PORTAL HYPERTENSION (HCC): ICD-10-CM

## 2024-11-11 PROCEDURE — G8482 FLU IMMUNIZE ORDER/ADMIN: HCPCS | Performed by: SURGERY

## 2024-11-11 PROCEDURE — 3017F COLORECTAL CA SCREEN DOC REV: CPT | Performed by: SURGERY

## 2024-11-11 PROCEDURE — 99212 OFFICE O/P EST SF 10 MIN: CPT | Performed by: SURGERY

## 2024-11-11 PROCEDURE — 3074F SYST BP LT 130 MM HG: CPT | Performed by: SURGERY

## 2024-11-11 PROCEDURE — 1123F ACP DISCUSS/DSCN MKR DOCD: CPT | Performed by: SURGERY

## 2024-11-11 PROCEDURE — G8427 DOCREV CUR MEDS BY ELIG CLIN: HCPCS | Performed by: SURGERY

## 2024-11-11 PROCEDURE — G8420 CALC BMI NORM PARAMETERS: HCPCS | Performed by: SURGERY

## 2024-11-11 PROCEDURE — 1036F TOBACCO NON-USER: CPT | Performed by: SURGERY

## 2024-11-11 PROCEDURE — 3078F DIAST BP <80 MM HG: CPT | Performed by: SURGERY

## 2024-11-11 NOTE — PATIENT INSTRUCTIONS
SURVEY:    You may be receiving a survey from [facility] regarding your visit today.    You may get this in the mail, through your MyChart, or in your email.     Please complete the survey to enable us to provide the highest quality of care to you and your family.    If you cannot score us a very good (5 Stars) on any question, please call the office to discuss how we could of made your experience exceptional.    Thank you!    MD Dr. Rose Leblanc, DO  Dr. German Donaldson, DO    Dr. Jony Victor, DO    MD Shanna Barclay, APRN-ABNER Rose, JOSE Mooney LPN Jena Adams, MA Emily Akers, MA    Phone: [phone]  Fax: [phone]    Office Hours:   M-TH 8-5, F: 8-12

## 2024-11-11 NOTE — PROGRESS NOTES
Patient here for ongoing management of liver diease, cirrhosis. Referred for EGD and colonoscopy.    While I can do the endoscopy, he would be better severed  by seeing a gastroenterologist/hepatologist.  As a general rule I don't manage chronic liver disease.    Follow up PRN

## 2024-11-18 ENCOUNTER — TELEPHONE (OUTPATIENT)
Dept: SURGERY | Age: 64
End: 2024-11-18

## 2024-11-18 DIAGNOSIS — K74.60 HEPATIC CIRRHOSIS, UNSPECIFIED HEPATIC CIRRHOSIS TYPE, UNSPECIFIED WHETHER ASCITES PRESENT (HCC): Primary | ICD-10-CM

## 2024-11-18 NOTE — TELEPHONE ENCOUNTER
Please contact Navi and let him know that our office no longer has a GI physician.  He will need a referral to a GI or Hepatologist for ongoing care of his Cirrhosis.  [facility] has GI physicians in [place], [facility].  There is a GI practice in [place] as well.  Please ask where he prefers and place referral. Thanks, Shanna

## 2024-11-18 NOTE — TELEPHONE ENCOUNTER
Well-controlled, continue current medications, continue current treatment plan, medication adherence emphasized, and lifestyle modifications recommended   Patient seen by Dr. Wolfe on 11/11/24. Recommendations for New GI provider provided to caregiver at appointment with patient.

## 2024-11-20 ENCOUNTER — HOSPITAL ENCOUNTER (OUTPATIENT)
Age: 64
Discharge: HOME | End: 2024-11-20
Payer: MEDICARE

## 2024-11-20 DIAGNOSIS — D61.818 PANCYTOPENIA (HCC): ICD-10-CM

## 2024-11-20 DIAGNOSIS — D50.9 IRON DEFICIENCY ANEMIA, UNSPECIFIED IRON DEFICIENCY ANEMIA TYPE: ICD-10-CM

## 2024-11-20 LAB
BASOPHILS # BLD: 0 K/UL (ref 0–0.2)
BASOPHILS NFR BLD: 0 % (ref 0–2)
EOSINOPHIL # BLD: 0.07 K/UL (ref 0–0.44)
EOSINOPHILS RELATIVE PERCENT: 2 % (ref 1–4)
ERYTHROCYTE [DISTWIDTH] IN BLOOD BY AUTOMATED COUNT: 12.5 % (ref 11.8–14.4)
FERRITIN SERPL-MCNC: 92 NG/ML
HCT VFR BLD AUTO: 37.1 % (ref 40.7–50.3)
HGB BLD-MCNC: 12.5 G/DL (ref 13–17)
IMM GRANULOCYTES # BLD AUTO: 0 K/UL (ref 0–0.3)
IMM GRANULOCYTES NFR BLD: 0 %
IRON SATN MFR SERPL: 28 % (ref 20–55)
IRON SERPL-MCNC: 80 UG/DL (ref 61–157)
LYMPHOCYTES NFR BLD: 0.73 K/UL (ref 1.1–3.7)
LYMPHOCYTES RELATIVE PERCENT: 22 % (ref 24–43)
MCH RBC QN AUTO: 33.5 PG (ref 25.2–33.5)
MCHC RBC AUTO-ENTMCNC: 33.7 G/DL (ref 28.4–34.8)
MCV RBC AUTO: 99.5 FL (ref 82.6–102.9)
MONOCYTES NFR BLD: 0.36 K/UL (ref 0.1–1.2)
MONOCYTES NFR BLD: 11 % (ref 3–12)
MORPHOLOGY: ABNORMAL
NEUTROPHILS NFR BLD: 65 % (ref 36–65)
NEUTS SEG NFR BLD: 2.14 K/UL (ref 1.5–8.1)
NRBC BLD-RTO: 0 PER 100 WBC
PLATELET # BLD AUTO: ABNORMAL K/UL (ref 138–453)
PLATELET, FLUORESCENCE: 57 K/UL (ref 138–453)
PLATELETS.RETICULATED NFR BLD AUTO: 3.8 % (ref 1.1–10.3)
RBC # BLD AUTO: 3.73 M/UL (ref 4.21–5.77)
TIBC SERPL-MCNC: 284 UG/DL (ref 250–450)
UNSATURATED IRON BINDING CAPACITY: 204 UG/DL (ref 112–347)
WBC OTHER # BLD: 3.3 K/UL (ref 3.5–11.3)

## 2024-11-20 PROCEDURE — 85025 COMPLETE CBC W/AUTO DIFF WBC: CPT

## 2024-11-20 PROCEDURE — 36415 COLL VENOUS BLD VENIPUNCTURE: CPT

## 2024-11-20 PROCEDURE — 83550 IRON BINDING TEST: CPT

## 2024-11-20 PROCEDURE — 82728 ASSAY OF FERRITIN: CPT

## 2024-11-20 PROCEDURE — 83540 ASSAY OF IRON: CPT

## 2024-11-27 ENCOUNTER — OFFICE VISIT (OUTPATIENT)
Dept: ONCOLOGY | Age: 64
End: 2024-11-27
Payer: MEDICARE

## 2024-11-27 VITALS
RESPIRATION RATE: 18 BRPM | SYSTOLIC BLOOD PRESSURE: 126 MMHG | WEIGHT: 220 LBS | DIASTOLIC BLOOD PRESSURE: 60 MMHG | BODY MASS INDEX: 25.1 KG/M2 | TEMPERATURE: 97.5 F | HEART RATE: 73 BPM

## 2024-11-27 DIAGNOSIS — D69.6 THROMBOCYTOPENIA (HCC): Primary | ICD-10-CM

## 2024-11-27 DIAGNOSIS — D50.9 IRON DEFICIENCY ANEMIA, UNSPECIFIED IRON DEFICIENCY ANEMIA TYPE: ICD-10-CM

## 2024-11-27 PROCEDURE — 3017F COLORECTAL CA SCREEN DOC REV: CPT | Performed by: INTERNAL MEDICINE

## 2024-11-27 PROCEDURE — 1036F TOBACCO NON-USER: CPT | Performed by: INTERNAL MEDICINE

## 2024-11-27 PROCEDURE — G8427 DOCREV CUR MEDS BY ELIG CLIN: HCPCS | Performed by: INTERNAL MEDICINE

## 2024-11-27 PROCEDURE — G8482 FLU IMMUNIZE ORDER/ADMIN: HCPCS | Performed by: INTERNAL MEDICINE

## 2024-11-27 PROCEDURE — 99211 OFF/OP EST MAY X REQ PHY/QHP: CPT | Performed by: INTERNAL MEDICINE

## 2024-11-27 PROCEDURE — 1123F ACP DISCUSS/DSCN MKR DOCD: CPT | Performed by: INTERNAL MEDICINE

## 2024-11-27 PROCEDURE — 99214 OFFICE O/P EST MOD 30 MIN: CPT | Performed by: INTERNAL MEDICINE

## 2024-11-27 PROCEDURE — G8417 CALC BMI ABV UP PARAM F/U: HCPCS | Performed by: INTERNAL MEDICINE

## 2024-11-27 PROCEDURE — 3078F DIAST BP <80 MM HG: CPT | Performed by: INTERNAL MEDICINE

## 2024-11-27 PROCEDURE — 3074F SYST BP LT 130 MM HG: CPT | Performed by: INTERNAL MEDICINE

## 2024-11-27 NOTE — PROGRESS NOTES
Chief Complaint   Patient presents with    Follow-up     ANEMIA       DIAGNOSIS:   Thrombocytopenia related to liver disease  Iron deficiency anemia, corrected with oral iron  CURRENT THERAPY:  Oral iron  Observation for thrombocytopenia  BRIEF CASE HISTORY:   Navi Hammer is a very pleasant 64 y.o. male who was by Dr. Worley due to anemia and thrombocytopenia.  Work-up suggested cirrhosis and portal hypertension with esophageal varices.  Patient was also found to have iron deficiency and was started on oral iron.  INTERIM HISTORY:  The patient comes in today for a follow up, he feels well and has no complaints  He denies any bleeding or bruising.  He stays in the group home.  Overall condition has been unchanged.  Previsit labs were reviewed.  Continues to have mild anemia and mild leukopenia.  Platelets are stable in the same range.      PAST MEDICAL HISTORY: has a past medical history of Candidal balanitis, Cirrhosis (HCC), DM type 2 (diabetes mellitus, type 2) (HCC), Esophageal varices without bleeding (HCC), Essential hypertension, Hepatitis, Mental disability, Mixed hyperlipidemia, Portal hypertension (HCC), TB (tuberculosis), and Unspecified convulsions.    PAST SURGICAL HISTORY: has a past surgical history that includes eye surgery; Nasal polyp surgery; Upper gastrointestinal endoscopy (08/05/2013); Upper gastrointestinal endoscopy (11/04/2013); Upper gastrointestinal endoscopy (06/23/2014); Upper gastrointestinal endoscopy (10/06/2014); Upper gastrointestinal endoscopy (01/20/2015); Colonoscopy (03/21/2016); Upper gastrointestinal endoscopy (03/21/2016); Upper gastrointestinal endoscopy (09/19/2016); Upper gastrointestinal endoscopy (03/14/2017); pr esophagogastroduodenoscopy transoral diagnostic (N/A, 03/14/2017); Cataract removal (1977); Cholecystectomy (2007); Nose surgery; pr esophagogastroduodenoscopy transoral diagnostic (N/A, 11/29/2017); pr colon ca scrn not hi rsk ind (N/A, 11/29/2017); Upper

## 2024-12-17 PROBLEM — H61.23 IMPACTED CERUMEN, BILATERAL: Status: ACTIVE | Noted: 2024-12-17

## 2025-02-04 ENCOUNTER — HOSPITAL ENCOUNTER (EMERGENCY)
Age: 65
Discharge: HOME | End: 2025-02-04
Attending: EMERGENCY MEDICINE
Payer: MEDICARE

## 2025-02-04 ENCOUNTER — APPOINTMENT (OUTPATIENT)
Dept: GENERAL RADIOLOGY | Age: 65
End: 2025-02-04
Payer: MEDICARE

## 2025-02-04 ENCOUNTER — APPOINTMENT (OUTPATIENT)
Dept: VASCULAR LAB | Age: 65
End: 2025-02-04
Attending: EMERGENCY MEDICINE
Payer: MEDICARE

## 2025-02-04 VITALS
TEMPERATURE: 98.5 F | SYSTOLIC BLOOD PRESSURE: 155 MMHG | DIASTOLIC BLOOD PRESSURE: 55 MMHG | HEART RATE: 75 BPM | OXYGEN SATURATION: 96 % | RESPIRATION RATE: 17 BRPM

## 2025-02-04 DIAGNOSIS — L03.116 LEFT LEG CELLULITIS: Primary | ICD-10-CM

## 2025-02-04 LAB
ALBUMIN SERPL-MCNC: 3.8 G/DL (ref 3.5–5.2)
ALBUMIN/GLOB SERPL: 1.3 {RATIO} (ref 1–2.5)
ALP SERPL-CCNC: 87 U/L (ref 40–129)
ALT SERPL-CCNC: 18 U/L (ref 10–50)
ANION GAP SERPL CALCULATED.3IONS-SCNC: 9 MMOL/L (ref 9–16)
AST SERPL-CCNC: 25 U/L (ref 10–50)
BASOPHILS # BLD: 0 K/UL (ref 0–0.2)
BASOPHILS NFR BLD: 0 % (ref 0–2)
BILIRUB DIRECT SERPL-MCNC: 0.9 MG/DL (ref 0–0.3)
BILIRUB INDIRECT SERPL-MCNC: 1.5 MG/DL (ref 0–1)
BILIRUB SERPL-MCNC: 2.4 MG/DL (ref 0–1.2)
BUN SERPL-MCNC: 23 MG/DL (ref 8–23)
BUN/CREAT SERPL: 16 (ref 9–20)
CALCIUM SERPL-MCNC: 9 MG/DL (ref 8.6–10.4)
CHLORIDE SERPL-SCNC: 107 MMOL/L (ref 98–107)
CO2 SERPL-SCNC: 24 MMOL/L (ref 20–31)
CREAT SERPL-MCNC: 1.4 MG/DL (ref 0.7–1.2)
CRP SERPL HS-MCNC: 39.1 MG/L (ref 0–5)
EOSINOPHIL # BLD: 0 K/UL (ref 0–0.44)
EOSINOPHILS RELATIVE PERCENT: 0 % (ref 1–4)
ERYTHROCYTE [DISTWIDTH] IN BLOOD BY AUTOMATED COUNT: 12.9 % (ref 11.8–14.4)
ERYTHROCYTE [SEDIMENTATION RATE] IN BLOOD BY PHOTOMETRIC METHOD: 17 MM/HR (ref 0–20)
GFR, ESTIMATED: 55 ML/MIN/1.73M2
GLUCOSE SERPL-MCNC: 165 MG/DL (ref 74–99)
HCT VFR BLD AUTO: 39 % (ref 40.7–50.3)
HGB BLD-MCNC: 13.2 G/DL (ref 13–17)
IMM GRANULOCYTES # BLD AUTO: 0 K/UL (ref 0–0.3)
IMM GRANULOCYTES NFR BLD: 0 %
INR PPP: 1.8
LYMPHOCYTES NFR BLD: 0.54 K/UL (ref 1.1–3.7)
LYMPHOCYTES RELATIVE PERCENT: 8 % (ref 24–43)
MCH RBC QN AUTO: 33.4 PG (ref 25.2–33.5)
MCHC RBC AUTO-ENTMCNC: 33.8 G/DL (ref 28.4–34.8)
MCV RBC AUTO: 98.7 FL (ref 82.6–102.9)
MONOCYTES NFR BLD: 0.68 K/UL (ref 0.1–1.2)
MONOCYTES NFR BLD: 10 % (ref 3–12)
MORPHOLOGY: ABNORMAL
NEUTROPHILS NFR BLD: 82 % (ref 36–65)
NEUTS SEG NFR BLD: 5.58 K/UL (ref 1.5–8.1)
NRBC BLD-RTO: 0 PER 100 WBC
PLATELET # BLD AUTO: ABNORMAL K/UL (ref 138–453)
PLATELET, FLUORESCENCE: 44 K/UL (ref 138–453)
PLATELETS.RETICULATED NFR BLD AUTO: 3.6 % (ref 1.1–10.3)
POTASSIUM SERPL-SCNC: 4.3 MMOL/L (ref 3.7–5.3)
PROT SERPL-MCNC: 6.9 G/DL (ref 6.6–8.7)
PROTHROMBIN TIME: 20.1 SEC (ref 11.7–14.1)
RBC # BLD AUTO: 3.95 M/UL (ref 4.21–5.77)
SODIUM SERPL-SCNC: 140 MMOL/L (ref 136–145)
WBC OTHER # BLD: 6.8 K/UL (ref 3.5–11.3)

## 2025-02-04 PROCEDURE — 36415 COLL VENOUS BLD VENIPUNCTURE: CPT

## 2025-02-04 PROCEDURE — 93971 EXTREMITY STUDY: CPT

## 2025-02-04 PROCEDURE — 80076 HEPATIC FUNCTION PANEL: CPT

## 2025-02-04 PROCEDURE — 73590 X-RAY EXAM OF LOWER LEG: CPT

## 2025-02-04 PROCEDURE — 80048 BASIC METABOLIC PNL TOTAL CA: CPT

## 2025-02-04 PROCEDURE — 85025 COMPLETE CBC W/AUTO DIFF WBC: CPT

## 2025-02-04 PROCEDURE — 99284 EMERGENCY DEPT VISIT MOD MDM: CPT

## 2025-02-04 PROCEDURE — 6370000000 HC RX 637 (ALT 250 FOR IP): Performed by: EMERGENCY MEDICINE

## 2025-02-04 PROCEDURE — 85652 RBC SED RATE AUTOMATED: CPT

## 2025-02-04 PROCEDURE — 85610 PROTHROMBIN TIME: CPT

## 2025-02-04 PROCEDURE — 86140 C-REACTIVE PROTEIN: CPT

## 2025-02-04 RX ORDER — ACETAMINOPHEN 325 MG/1
650 TABLET ORAL EVERY 6 HOURS PRN
Qty: 40 TABLET | Refills: 0 | Status: SHIPPED | OUTPATIENT
Start: 2025-02-04

## 2025-02-04 RX ORDER — DOXYCYCLINE 100 MG/1
100 CAPSULE ORAL ONCE
Status: COMPLETED | OUTPATIENT
Start: 2025-02-04 | End: 2025-02-04

## 2025-02-04 RX ORDER — ACETAMINOPHEN 500 MG
1000 TABLET ORAL ONCE
Status: COMPLETED | OUTPATIENT
Start: 2025-02-04 | End: 2025-02-04

## 2025-02-04 RX ORDER — DOXYCYCLINE HYCLATE 100 MG
100 TABLET ORAL 2 TIMES DAILY
Qty: 14 TABLET | Refills: 0 | Status: SHIPPED | OUTPATIENT
Start: 2025-02-04 | End: 2025-02-11

## 2025-02-04 RX ADMIN — ACETAMINOPHEN 1000 MG: 500 TABLET ORAL at 10:30

## 2025-02-04 RX ADMIN — DOXYCYCLINE HYCLATE 100 MG: 100 CAPSULE ORAL at 12:15

## 2025-02-04 NOTE — ED PROVIDER NOTES
[facility] EMERGENCY DEPARTMENT  Emergency Department Encounter  Emergency Medicine      Pt Name:Navi Hammer  MRN: [id number]  Birthdate 1960  Date of evaluation: 2/4/25  PCP:  Melvin Brennan MD  10:04 AM EST      CHIEF COMPLAINT       Chief Complaint   Patient presents with    Leg Pain    Leg Swelling     Pt is from Group home in [place], [place] two days ago pt has had swelling, increased pain and redness to his left lower leg.        HISTORY OF PRESENT ILLNESS  (Location/Symptom, Timing/Onset, Context/Setting, Quality, Duration, Modifying Factors, Severity.)      Navi Hammer is a 64 y.o. male who presents with pain and swelling to his left leg that started 2 days ago.  He does have a history of diabetes, as well as cirrhosis, hypertension.  Does have mental disability is from a group home.  Patient continued to cry due to the pain in his left leg and came in for further evaluation.  Denies any trauma.  Patient is ambulatory.    PAST MEDICAL / SURGICAL / SOCIAL / FAMILY HISTORY      has a past medical history of Candidal balanitis, Cirrhosis (HCC), DM type 2 (diabetes mellitus, type 2) (HCC), Esophageal varices without bleeding (HCC), Essential hypertension, Hepatitis, Mental disability, Mixed hyperlipidemia, Portal hypertension (HCC), TB (tuberculosis), and Unspecified convulsions.       has a past surgical history that includes eye surgery; Nasal polyp surgery; Upper gastrointestinal endoscopy (08/05/2013); Upper gastrointestinal endoscopy (11/04/2013); Upper gastrointestinal endoscopy (06/23/2014); Upper gastrointestinal endoscopy (10/06/2014); Upper gastrointestinal endoscopy (01/20/2015); Colonoscopy (03/21/2016); Upper gastrointestinal endoscopy (03/21/2016); Upper gastrointestinal endoscopy (09/19/2016); Upper gastrointestinal endoscopy (03/14/2017); pr esophagogastroduodenoscopy transoral diagnostic (N/A, 03/14/2017); Cataract removal (1977); Cholecystectomy (2007); Nose surgery; pr

## 2025-02-04 NOTE — DISCHARGE INSTRUCTIONS
Please keep foot elevated as needed, take Tylenol for pain control and take your antibiotics until they are completed.  Please follow-up with your primary care doctor in 2 to 3 days for repeat evaluation.  If you notice that the redness is getting worse or extending up the leg.  Or you develop a fever then please return to the emergency room.

## 2025-02-05 PROCEDURE — 93971 EXTREMITY STUDY: CPT | Performed by: STUDENT IN AN ORGANIZED HEALTH CARE EDUCATION/TRAINING PROGRAM

## (undated) DEVICE — SOLUTION IRRIG 1000ML 0.9% SOD CHL USP POUR PLAS BTL

## (undated) DEVICE — SOLUTION IV IRRIG POUR BRL 0.9% SODIUM CHL 2F7124

## (undated) DEVICE — CANNULA ORAL NSL AD CO2 N INTUB O2 DEL DISP TRU LNK

## (undated) DEVICE — MEDI-VAC NON-CONDUCTIVE TUBING7MM X 30.5 (100FT): Brand: CARDINAL HEALTH

## (undated) DEVICE — TUBING SUCT NON-STRL 9/32X100 W/CNNT

## (undated) DEVICE — FORCEPS BX L240CM JAW DIA2.4MM ORNG L CAP W/ NDL DISP RAD

## (undated) DEVICE — LIGATOR ENDOSCP L2.8MM DIA8.6-11.5MM MULT BND SPEEDBAND

## (undated) DEVICE — MULTIPLE BAND LIGATOR: Brand: SPEEDBAND SUPERVIEW SUPER 7

## (undated) DEVICE — NEEDLE 25GAX5.0MM INJ CARR LOCKE

## (undated) DEVICE — FORCEP SPEC RETRV BX AD 2 MMX155 CM 5 MM GI OVL CUP W/ NDL

## (undated) DEVICE — LIGATOR ENDOSCP DIA8.6-11.5MM MULT DISP SPDBND LIGATOR SUP